# Patient Record
Sex: FEMALE | Race: WHITE | NOT HISPANIC OR LATINO | URBAN - METROPOLITAN AREA
[De-identification: names, ages, dates, MRNs, and addresses within clinical notes are randomized per-mention and may not be internally consistent; named-entity substitution may affect disease eponyms.]

---

## 2025-02-21 PROBLEM — E53.8 COBALAMIN DEFICIENCY: Status: ACTIVE | Noted: 2024-01-09

## 2025-02-21 PROBLEM — E04.1 THYROID NODULE GREATER THAN OR EQUAL TO 1.5 CM IN DIAMETER INCIDENTALLY NOTED ON IMAGING STUDY: Status: ACTIVE | Noted: 2024-01-09

## 2025-02-21 PROBLEM — R53.83 FATIGUE: Status: ACTIVE | Noted: 2024-01-09

## 2025-02-21 PROBLEM — E87.5 HYPERKALEMIA: Status: ACTIVE | Noted: 2023-08-18

## 2025-02-21 PROBLEM — M25.559 ARTHRALGIA OF HIP: Status: ACTIVE | Noted: 2024-01-09

## 2025-02-21 PROBLEM — N13.30 HYDRONEPHROSIS: Status: ACTIVE | Noted: 2024-01-09

## 2025-02-21 PROBLEM — M85.80 OSTEOPENIA: Status: ACTIVE | Noted: 2024-01-09

## 2025-02-21 PROBLEM — G47.62 SLEEP RELATED LEG CRAMPS: Status: ACTIVE | Noted: 2025-02-21

## 2025-02-21 PROBLEM — R01.1 SYSTOLIC MURMUR: Status: ACTIVE | Noted: 2024-01-09

## 2025-02-21 PROBLEM — E78.00 PURE HYPERCHOLESTEROLEMIA: Status: ACTIVE | Noted: 2025-02-21

## 2025-02-21 PROBLEM — R25.2 CRAMPS OF LOWER EXTREMITY: Status: ACTIVE | Noted: 2024-01-09

## 2025-02-21 PROBLEM — D64.9 ANEMIA: Status: ACTIVE | Noted: 2024-01-09

## 2025-02-21 PROBLEM — N39.41 URGE INCONTINENCE OF URINE: Status: ACTIVE | Noted: 2025-02-21

## 2025-02-21 PROBLEM — C49.21: Status: ACTIVE | Noted: 2020-07-02

## 2025-02-21 PROBLEM — K57.30 DIVERTICULA OF INTESTINE: Status: ACTIVE | Noted: 2020-03-26

## 2025-02-21 PROBLEM — K90.9 INTESTINAL MALABSORPTION: Status: ACTIVE | Noted: 2024-01-09

## 2025-02-21 PROBLEM — I10 HYPERTENSION: Status: ACTIVE | Noted: 2024-01-09

## 2025-02-21 PROBLEM — M19.90 OSTEOARTHRITIS: Status: ACTIVE | Noted: 2020-05-29

## 2025-02-21 RX ORDER — CELECOXIB 100 MG/1
100 CAPSULE ORAL 2 TIMES DAILY
COMMUNITY
Start: 2020-07-29 | End: 2025-03-06 | Stop reason: ALTCHOICE

## 2025-02-21 RX ORDER — FLAXSEED OIL 1000 MG
1000 CAPSULE ORAL DAILY
COMMUNITY
Start: 2020-07-29

## 2025-02-21 RX ORDER — METOPROLOL TARTRATE 25 MG/1
12.5 TABLET, FILM COATED ORAL EVERY OTHER DAY
COMMUNITY
Start: 2023-11-29

## 2025-02-21 RX ORDER — GABAPENTIN 100 MG/1
100 CAPSULE ORAL DAILY
COMMUNITY
Start: 2023-11-20

## 2025-02-21 RX ORDER — ASPIRIN 325 MG
50000 TABLET, DELAYED RELEASE (ENTERIC COATED) ORAL WEEKLY
COMMUNITY
Start: 2020-07-29

## 2025-02-21 RX ORDER — SIMVASTATIN 10 MG/1
10 TABLET, FILM COATED ORAL DAILY
COMMUNITY
Start: 2020-07-29

## 2025-02-21 RX ORDER — DEXTROMETHORPHAN HYDROBROMIDE, GUAIFENESIN 5; 100 MG/5ML; MG/5ML
LIQUID ORAL
COMMUNITY
Start: 2020-07-29

## 2025-02-21 RX ORDER — MIRABEGRON 25 MG/1
50 TABLET, FILM COATED, EXTENDED RELEASE ORAL DAILY
COMMUNITY
Start: 2023-12-13

## 2025-02-21 RX ORDER — SULFASALAZINE 500 MG/1
500 TABLET ORAL 4 TIMES DAILY
COMMUNITY

## 2025-02-21 RX ORDER — METHOTREXATE 2.5 MG/1
15 TABLET ORAL WEEKLY
COMMUNITY
Start: 2023-12-07

## 2025-02-21 RX ORDER — FOLIC ACID 1 MG/1
1 TABLET ORAL 3 TIMES DAILY
COMMUNITY

## 2025-02-21 RX ORDER — OMEPRAZOLE 40 MG/1
40 CAPSULE, DELAYED RELEASE ORAL
COMMUNITY
Start: 2020-07-29

## 2025-02-21 RX ORDER — HYDROXYCHLOROQUINE SULFATE 300 MG/1
300 TABLET ORAL DAILY
COMMUNITY

## 2025-02-21 SDOH — HEALTH STABILITY: PHYSICAL HEALTH: ON AVERAGE, HOW MANY DAYS PER WEEK DO YOU ENGAGE IN MODERATE TO STRENUOUS EXERCISE (LIKE A BRISK WALK)?: 3 DAYS

## 2025-02-21 NOTE — PROGRESS NOTES
GENERAL SURGERY/TRAUMA  HISTORY AND PHYSICAL/CONSULT    Date: 3/6/2025 Time: 3:08 PM    Name: Beatriz Raymond  MRN: [id number]    This is a 79 y.o. female presenting today as a new patient consult. Referred to me by Dr. Levon Palmer from [facility] for management of large size hiatal hernia and refractory GERD.     EGD 2/20/2025:            PAST MEDICAL HISTORY:  Patient Active Problem List   Diagnosis    Anemia    Arthralgia of hip    Atrophic vaginitis    Cobalamin deficiency    Constipation    Cramps of lower extremity    Sleep related leg cramps    Diverticula of intestine    Fatigue    Gastroesophageal reflux disease without esophagitis    Hydronephrosis    Hyperkalemia    Hypertension    Osteoarthritis    Intestinal malabsorption    Urge incontinence of urine    Osteopenia    Pure hypercholesterolemia    Soft tissue sarcoma of thigh, right (Multi)    Systolic murmur    Thyroid nodule greater than or equal to 1.5 cm in diameter incidentally noted on imaging study      Past Medical History:   Diagnosis Date    Chronic back pain     Colon polyp 2020    GERD (gastroesophageal reflux disease)     HLD (hyperlipidemia)     PONV (postoperative nausea and vomiting) 1975    RA (rheumatoid arthritis)     Sarcoidosis     Sarcoma of right thigh (Multi)     Thyroid nodule 2014        PAST SURGICAL HISTORY:  Past Surgical History:   Procedure Laterality Date    CHOLECYSTECTOMY  6/8/20    ESOPHAGOGASTRODUODENOSCOPY  4/8/16    HYSTERECTOMY  7/6/10       FAMILY HISTORY:  Family History   Problem Relation Name Age of Onset    Arthritis Mother Ce Kuhn     Hypertension Mother Ce Kuhn     Miscarriages / Stillbirths Mother Ce Kuhn     Cancer Father Danilo Connellyarcadio     Hearing loss Father Danilo Montenegrodave     Hyperlipidemia Father Danilo Hattie     Hearing loss Brother Poncho Montenegrodave     Breast cancer Paternal Grandmother Renetta montenegrodave     Colon cancer Neg Hx      Celiac disease Neg Hx      Colon polyps  Neg Hx      Irritable bowel syndrome Neg Hx      Liver disease Neg Hx      Blood clot Neg Hx          SOCIAL HISTORY:  Social History     Tobacco Use    Smoking status: Never    Smokeless tobacco: Never   Vaping Use    Vaping status: Never Used   Substance Use Topics    Alcohol use: Never    Drug use: Never       MEDICATIONS:  Prior to Admission Medications:    Current Outpatient Medications:     acetaminophen (Tylenol 8 HOUR) 650 mg ER tablet, , Disp: , Rfl:     calcium carbonate/vitamin D3 (CALCIUM 600 + D,3, ORAL), Take 600 mg by mouth once daily., Disp: , Rfl:     cholecalciferol (Vitamin D-3) 50,000 unit capsule, Take 1 capsule (50,000 Units) by mouth 1 (one) time per week., Disp: , Rfl:     estrogens, conjugated, (Premarin) vaginal cream, Insert 0.5 g into the vagina., Disp: , Rfl:     famotidine (Pepcid) 40 mg tablet, 90 tablets (3,600 mg)., Disp: , Rfl:     flaxseed oiL 1,000 mg capsule, Take 1 capsule (1,000 mg) by mouth once daily., Disp: , Rfl:     folic acid (Folvite) 1 mg tablet, Take 1 tablet (1 mg) by mouth 3 times a day., Disp: , Rfl:     gabapentin (Neurontin) 100 mg capsule, Take 1 capsule (100 mg) by mouth once daily., Disp: , Rfl:     hydroxychloroquine (Sovuna) 300 mg tablet tablet, Take 1 tablet (300 mg) by mouth once daily., Disp: , Rfl:     losartan (Cozaar) 25 mg tablet, 0, Disp: , Rfl:     methotrexate (Trexall) 2.5 mg tablet, Take 6 tablets (15 mg total) by mouth 1 (one) time per week., Disp: , Rfl:     metoprolol tartrate (Lopressor) 25 mg tablet, Take 0.5 tablets (12.5 mg) by mouth every other day., Disp: , Rfl:     mirabegron (Myrbetriq) 25 mg tablet extended release 24 hr 24 hr tablet, Take 2 tablets (50 mg) by mouth once daily., Disp: , Rfl:     omeprazole (PriLOSEC) 40 mg DR capsule, Take 1 capsule (40 mg) by mouth once daily in the morning. Take before meals., Disp: , Rfl:     simvastatin (Zocor) 10 mg tablet, Take 1 tablet (10 mg) by mouth once daily., Disp: , Rfl:      "sulfaSALAzine (Azulfidine) 500 mg tablet, Take 1 tablet (500 mg) by mouth 4 times a day., Disp: , Rfl:     amLODIPine (Norvasc) 5 mg tablet, 0, Disp: , Rfl:     ALLERGIES:  Allergies   Allergen Reactions    Codeine Nausea/vomiting, Nausea And Vomiting and Nausea Only     VOMITING    Other reaction(s): Contraindication-Medical Surgical   VOMITING    Codeine Sulfate Nausea/vomiting    Corn Containing Products Diarrhea     SYRUP    Iodine Rash       REVIEW OF SYSTEMS:  GENERAL: Negative for malaise, significant weight loss and fever  NECK: Negative for lumps, goiter, pain and significant neck swelling  RESPIRATORY: Negative for cough, wheezing or shortness of breath.  CARDIOVASCULAR: Negative for chest pain, leg swelling or palpitations.  GI: Negative for abdominal discomfort, blood in stools or black stools or change in bowel habits  : No history of dysuria, frequency or incontinence  MUSCULOSKELETAL: Negative for joint pain or swelling, back pain or muscle pain.  SKIN: Negative for lesions, rash, and itching.  PSYCH: Negative for sleep disturbance, mood disorder and recent psychosocial stressors.  ENDOCRINE: Negative for cold or heat intolerance, polyuria, polydipsia and goiter.    PHYSICAL EXAM:  Blood pressure 157/74, pulse 72, height 1.638 m (5' 4.5\"), weight 74.5 kg (164 lb 4.8 oz), SpO2 97%.  General appearance: NAD  Skin: warm, no erythema or rashes  Lungs: clear   Heart: regular rhythm   Abdomen: soft ND NT   Extremities: Normal exam of the extremities. No swelling or pain.    IMPRESSION:  79 year old female presenting today as a new patient consult. Referred to me by Dr. Levon Palmer from [facility] for management of large size hiatal hernia and refractory GERD.     PLAN:    Hiatal hernia repair with fundoplication discussed, all questions answered.  Patient and spouse voiced understanding and want to proceed with surgery.  Perioperative care discussed.    The risks of the procedure " including bleeding, visceral injury, perforation, pulmonary complications like atelectasis, pleural effusion etc, diarrhea or constipation, dysphagia, recurrent hiatal hernia, wrap related complications etc. infection, injury to neighboring organ, prolonged hospitalization, need for further procedure and death have been explained to the patient and Beatriz Raymond has expressed understanding and acceptance of them.     Will need cardiology clearance prior to surgery.    Alexander Barfield MD  Bariatric and Minimally Invasive General Surgery

## 2025-03-06 ENCOUNTER — OFFICE VISIT (OUTPATIENT)
Dept: SURGERY | Facility: CLINIC | Age: 80
End: 2025-03-06
Payer: MEDICARE

## 2025-03-06 ENCOUNTER — NUTRITION (OUTPATIENT)
Dept: SURGERY | Facility: CLINIC | Age: 80
End: 2025-03-06
Payer: MEDICARE

## 2025-03-06 VITALS
HEIGHT: 65 IN | DIASTOLIC BLOOD PRESSURE: 74 MMHG | BODY MASS INDEX: 27.38 KG/M2 | WEIGHT: 164.3 LBS | HEART RATE: 72 BPM | OXYGEN SATURATION: 97 % | SYSTOLIC BLOOD PRESSURE: 157 MMHG

## 2025-03-06 DIAGNOSIS — Z98.84 BARIATRIC SURGERY STATUS: ICD-10-CM

## 2025-03-06 DIAGNOSIS — K31.7 GASTRIC POLYPS: ICD-10-CM

## 2025-03-06 DIAGNOSIS — Z01.818 PREOPERATIVE CLEARANCE: ICD-10-CM

## 2025-03-06 DIAGNOSIS — R11.10 REGURGITATION OF FOOD: ICD-10-CM

## 2025-03-06 DIAGNOSIS — K21.9 GASTROESOPHAGEAL REFLUX DISEASE WITHOUT ESOPHAGITIS: ICD-10-CM

## 2025-03-06 DIAGNOSIS — K44.9 HIATAL HERNIA: Primary | ICD-10-CM

## 2025-03-06 PROCEDURE — 99205 OFFICE O/P NEW HI 60 MIN: CPT | Performed by: SURGERY

## 2025-03-06 PROCEDURE — 1159F MED LIST DOCD IN RCRD: CPT | Performed by: SURGERY

## 2025-03-06 PROCEDURE — 3078F DIAST BP <80 MM HG: CPT | Performed by: SURGERY

## 2025-03-06 PROCEDURE — 99215 OFFICE O/P EST HI 40 MIN: CPT | Performed by: SURGERY

## 2025-03-06 PROCEDURE — 1036F TOBACCO NON-USER: CPT | Performed by: SURGERY

## 2025-03-06 PROCEDURE — 3077F SYST BP >= 140 MM HG: CPT | Performed by: SURGERY

## 2025-03-06 RX ORDER — AMLODIPINE BESYLATE 5 MG/1
TABLET ORAL
COMMUNITY

## 2025-03-06 RX ORDER — LOSARTAN POTASSIUM 25 MG/1
TABLET ORAL
COMMUNITY
Start: 2023-11-29

## 2025-03-06 RX ORDER — FAMOTIDINE 40 MG/1
90 TABLET, FILM COATED ORAL
COMMUNITY
Start: 2024-06-28

## 2025-03-06 ASSESSMENT — PATIENT HEALTH QUESTIONNAIRE - PHQ9
SUM OF ALL RESPONSES TO PHQ9 QUESTIONS 1 AND 2: 0
1. LITTLE INTEREST OR PLEASURE IN DOING THINGS: NOT AT ALL
2. FEELING DOWN, DEPRESSED OR HOPELESS: NOT AT ALL

## 2025-03-07 NOTE — PROGRESS NOTES
Reviewed postop diet progression for hiatal hernia repair. Patient's spouse attended appointment, both asked appropriate questions. Provided with Hiatial Hernia Repair nutrition guidelines and RD contact information. Advised to contact RD with any questions.

## 2025-04-21 ENCOUNTER — OFFICE VISIT (OUTPATIENT)
Dept: CARDIOLOGY | Facility: CLINIC | Age: 80
End: 2025-04-21
Payer: MEDICARE

## 2025-04-21 ENCOUNTER — TELEPHONE (OUTPATIENT)
Dept: CARDIOLOGY | Facility: CLINIC | Age: 80
End: 2025-04-21

## 2025-04-21 VITALS
TEMPERATURE: 97.8 F | HEIGHT: 65 IN | WEIGHT: 157 LBS | BODY MASS INDEX: 26.16 KG/M2 | DIASTOLIC BLOOD PRESSURE: 70 MMHG | SYSTOLIC BLOOD PRESSURE: 125 MMHG | HEART RATE: 65 BPM

## 2025-04-21 DIAGNOSIS — I10 PRIMARY HYPERTENSION: ICD-10-CM

## 2025-04-21 DIAGNOSIS — E78.2 MIXED HYPERLIPIDEMIA: ICD-10-CM

## 2025-04-21 DIAGNOSIS — R01.1 MURMUR, HEART: ICD-10-CM

## 2025-04-21 DIAGNOSIS — Z01.818 PRE-OPERATIVE CLEARANCE: Primary | ICD-10-CM

## 2025-04-21 DIAGNOSIS — R94.31 ABNORMAL EKG: ICD-10-CM

## 2025-04-21 PROCEDURE — 99214 OFFICE O/P EST MOD 30 MIN: CPT | Performed by: NURSE PRACTITIONER

## 2025-04-21 PROCEDURE — 1036F TOBACCO NON-USER: CPT | Performed by: NURSE PRACTITIONER

## 2025-04-21 PROCEDURE — 99204 OFFICE O/P NEW MOD 45 MIN: CPT | Performed by: NURSE PRACTITIONER

## 2025-04-21 PROCEDURE — 3078F DIAST BP <80 MM HG: CPT | Performed by: NURSE PRACTITIONER

## 2025-04-21 PROCEDURE — 93005 ELECTROCARDIOGRAM TRACING: CPT | Performed by: NURSE PRACTITIONER

## 2025-04-21 PROCEDURE — 1159F MED LIST DOCD IN RCRD: CPT | Performed by: NURSE PRACTITIONER

## 2025-04-21 PROCEDURE — 1160F RVW MEDS BY RX/DR IN RCRD: CPT | Performed by: NURSE PRACTITIONER

## 2025-04-21 PROCEDURE — 3074F SYST BP LT 130 MM HG: CPT | Performed by: NURSE PRACTITIONER

## 2025-04-21 RX ORDER — LEFLUNOMIDE 20 MG/1
TABLET ORAL
COMMUNITY
Start: 2025-04-03

## 2025-04-21 NOTE — PROGRESS NOTES
"Chief Complaint:   Pre-operative clearance      History Of Present Illness:    Beatriz Raymond \"Slava" is a 79 y.o. female here for preoperative clearance for hernia surgery.     Had knee laparoscopic surgery under total sedation, without incidence, approximately 3 weeks ago.    Denies chest pain, SOB,     Prior to knee surgery when she would walk for exercise heart rate would go up quickly. Smart watch would read 120-140bpm. Did note she would become more SOB than baseline.     Underwent pre-operative stress test in 2020:  1. Left ventricle: Systolic function is normal by the biplane method of   disks. The estimated ejection fraction is 67%.   2. Pericardium, extracardiac: There is a moderate-sized left pleural   effusion noted.   3. Stress ECG conclusions: The stress ECG is normal.   4. Normal study after pharmacologic stress.    Hx of Sarcoidosis of lungs     Family Hx:  No family hx      Past Medical History:  She has a past medical history of Chronic back pain, Colon polyp (2020), GERD (gastroesophageal reflux disease), HLD (hyperlipidemia), PONV (postoperative nausea and vomiting) (1975), RA (rheumatoid arthritis), Sarcoidosis, Sarcoma of right thigh (Multi), and Thyroid nodule (2014).    Past Surgical History:  She has a past surgical history that includes Cholecystectomy (6/8/20); Hysterectomy (7/6/10); and Esophagogastroduodenoscopy (4/8/16).      Social History:  She reports that she has never smoked. She has never used smokeless tobacco. She reports that she does not drink alcohol and does not use drugs.    Family History:  Family History[1]    Allergies:  Codeine, Codeine sulfate, Corn containing products, and Iodine    Review of Systems  All pertinent systems have been reviewed and are negative except for what is stated in the history of present illness.    All other systems have been reviewed and are negative and noncontributory to this patient's current ailments.     Visit Vitals  /70 (BP Location: " "Right arm)   Pulse 65   Temp 36.6 °C (97.8 °F)   Ht 1.638 m (5' 4.5\")   Wt 71.2 kg (157 lb)   BMI 26.53 kg/m²   OB Status Postmenopausal   Smoking Status Never   BSA 1.8 m²     Objective   Vitals reviewed.   Constitutional:       Appearance: Healthy appearance. Not in distress.   Neck:      Vascular: No JVR. JVD normal.   Pulmonary:      Effort: Pulmonary effort is normal.      Breath sounds: Normal breath sounds. No wheezing. No rhonchi. No rales.   Chest:      Chest wall: Not tender to palpatation.   Cardiovascular:      PMI at left midclavicular line. Normal rate. Regular rhythm. Normal S1. Normal S2.       Murmurs: There is a grade 1/6 systolic murmur.      No gallop.  No click. No rub.   Edema:     Peripheral edema absent.   Abdominal:      General: Bowel sounds are normal.      Palpations: Abdomen is soft.      Tenderness: There is no abdominal tenderness.   Musculoskeletal: Normal range of motion.         General: No tenderness. Skin:     General: Skin is warm and dry.   Neurological:      General: No focal deficit present.      Mental Status: Alert and oriented to person, place and time.   Psychiatric:         Attention and Perception: Attention normal.         Mood and Affect: Mood normal.       Assessment/Plan   Diagnoses and all orders for this visit:  Pre-operative clearance  - EKG SB at 57bpm with inferior TWI. I cannot compare to prior EKGs (only see interpretation and not actual EKG tracing)  - The patient has no prior history of coronary artery disease, myocardial infarction, PCI (stent), CABG, heart failure, high-grade arrhythmias, valvular heart disease, pulmonary hypertension, peripheral arterial disease, or cerebrovascular disease.  The patient has no personal or family history of anesthetic complications during prior general anesthesia.  The patient has no history of DVT or PE.  Based upon the patient's present history, while ambulation is limited due to joint pain, their functional capacity is " greater than 4 METS.  Patients’ ability to expend >=4 METs can be assessed by estimates from activities of daily living; activities that expend >=4 METS include the ability to climb up a flight of stairs, walk up a hill, walk at ground level at 4 miles per hour, or perform heavy work around the house.  The patient denies chest pain or dyspnea on exertion.  The patient has no history of obstructive sleep apnea, alcohol abuse use, or tobacco use.  Pre-op laboratory studies were unremarkable.  - Due to murmur and abnormal EKG we will check TTE. Last TTE 5 years ago  Primary hypertension  - well controlled  Mixed hyperlipidemia  - simvastatin  Murmur, heart  - Transthoracic echo (TTE) complete; Future  Abnormal EKG  - Transthoracic echo (TTE) complete; Future    Outpatient Medications:  Current Outpatient Medications   Medication Instructions    acetaminophen (Tylenol 8 HOUR) 650 mg ER tablet     amLODIPine (Norvasc) 5 mg tablet 0    calcium carbonate/vitamin D3 (CALCIUM 600 + D,3, ORAL) 600 mg, Daily    cholecalciferol (VITAMIN D-3) 50,000 Units, Weekly    estrogens (conjugated) (PREMARIN) 0.5 g    famotidine (Pepcid) 40 mg tablet 90 tablets    flaxseed oiL 1,000 mg, Daily    hydroxychloroquine (SOVUNA) 300 mg, Daily    leflunomide (Arava) 20 mg tablet     losartan (Cozaar) 25 mg tablet 0    metoprolol tartrate (LOPRESSOR) 12.5 mg, Every other day    mirabegron (MYRBETRIQ) 50 mg, Daily    omeprazole (PRILOSEC) 40 mg, Daily before breakfast    simvastatin (ZOCOR) 10 mg, Daily    sulfaSALAzine (AZULFIDINE) 500 mg, 4 times daily     Exclusive of any other services or procedures performed, I, Gricelda CARMICHAEL, spent 30 minutes in duration for this visit today.  This time consisted of chart review, obtaining history, and/or performing the exam as documented above, as well as, documenting the clinical information for the encounter in the electronic record, discussing treatment options, plans, and/or goals with patient,  family, and/or caregiver, refilling medications, updating the electronic record, ordering medicines, lab work, imaging, referrals, and/or procedures as documented above and communicating with other [facility] professionals. I have discussed the results of laboratory, radiology, and cardiology studies with the patient and their family/caregiver.        I reviewed surgery's notes, labs, stress test and TTE in 2020. Reviewed EKG interpretation         [1]   Family History  Problem Relation Name Age of Onset    Arthritis Mother Ce Hattie     Hypertension Mother Ce Hattie     Miscarriages / Stillbirths Mother Ce Hattie     Cancer Father Danilo Her     Hearing loss Father Danilo Her     Hyperlipidemia Father Danilo Her     Hearing loss Brother Poncho Her     Breast cancer Paternal Grandmother Renetta her     Colon cancer Neg Hx      Celiac disease Neg Hx      Colon polyps Neg Hx      Irritable bowel syndrome Neg Hx      Liver disease Neg Hx      Blood clot Neg Hx

## 2025-04-22 LAB
ATRIAL RATE: 57 BPM
P AXIS: 26 DEGREES
P OFFSET: 188 MS
P ONSET: 134 MS
PR INTERVAL: 148 MS
Q ONSET: 208 MS
QRS COUNT: 10 BEATS
QRS DURATION: 98 MS
QT INTERVAL: 420 MS
QTC CALCULATION(BAZETT): 408 MS
QTC FREDERICIA: 413 MS
R AXIS: -23 DEGREES
T AXIS: 8 DEGREES
T OFFSET: 418 MS
VENTRICULAR RATE: 57 BPM

## 2025-04-25 ENCOUNTER — HOSPITAL ENCOUNTER (OUTPATIENT)
Dept: RADIOLOGY | Facility: CLINIC | Age: 80
Discharge: HOME | End: 2025-04-25
Payer: MEDICARE

## 2025-04-25 ENCOUNTER — HOSPITAL ENCOUNTER (OUTPATIENT)
Dept: CARDIOLOGY | Facility: CLINIC | Age: 80
Discharge: HOME | End: 2025-04-25
Payer: MEDICARE

## 2025-04-25 DIAGNOSIS — Z01.818 PRE-OPERATIVE CLEARANCE: ICD-10-CM

## 2025-04-25 DIAGNOSIS — R01.1 MURMUR, HEART: ICD-10-CM

## 2025-04-25 DIAGNOSIS — R94.31 ABNORMAL EKG: ICD-10-CM

## 2025-04-25 DIAGNOSIS — Z01.818 PREOPERATIVE CLEARANCE: ICD-10-CM

## 2025-04-25 DIAGNOSIS — K44.9 HIATAL HERNIA: ICD-10-CM

## 2025-04-25 PROCEDURE — 93306 TTE W/DOPPLER COMPLETE: CPT

## 2025-04-25 PROCEDURE — 71045 X-RAY EXAM CHEST 1 VIEW: CPT

## 2025-04-25 PROCEDURE — 93306 TTE W/DOPPLER COMPLETE: CPT | Performed by: INTERNAL MEDICINE

## 2025-04-26 LAB
ALBUMIN SERPL-MCNC: 4.3 G/DL (ref 3.6–5.1)
ALP SERPL-CCNC: 64 U/L (ref 37–153)
ALT SERPL-CCNC: 14 U/L (ref 6–29)
ANION GAP SERPL CALCULATED.4IONS-SCNC: 10 MMOL/L (CALC) (ref 7–17)
AST SERPL-CCNC: 9 U/L (ref 10–35)
BASOPHILS # BLD AUTO: 51 CELLS/UL (ref 0–200)
BASOPHILS NFR BLD AUTO: 0.8 %
BILIRUB SERPL-MCNC: 0.4 MG/DL (ref 0.2–1.2)
BUN SERPL-MCNC: 30 MG/DL (ref 7–25)
CALCIUM SERPL-MCNC: 9.6 MG/DL (ref 8.6–10.4)
CHLORIDE SERPL-SCNC: 107 MMOL/L (ref 98–110)
CO2 SERPL-SCNC: 24 MMOL/L (ref 20–32)
CREAT SERPL-MCNC: 1.22 MG/DL (ref 0.6–1)
EGFRCR SERPLBLD CKD-EPI 2021: 45 ML/MIN/1.73M2
EOSINOPHIL # BLD AUTO: 160 CELLS/UL (ref 15–500)
EOSINOPHIL NFR BLD AUTO: 2.5 %
ERYTHROCYTE [DISTWIDTH] IN BLOOD BY AUTOMATED COUNT: 12.8 % (ref 11–15)
GLUCOSE SERPL-MCNC: 100 MG/DL (ref 65–99)
HCT VFR BLD AUTO: 27.9 % (ref 35–45)
HGB BLD-MCNC: 8.9 G/DL (ref 11.7–15.5)
LYMPHOCYTES # BLD AUTO: 691 CELLS/UL (ref 850–3900)
LYMPHOCYTES NFR BLD AUTO: 10.8 %
MCH RBC QN AUTO: 30.3 PG (ref 27–33)
MCHC RBC AUTO-ENTMCNC: 31.9 G/DL (ref 32–36)
MCV RBC AUTO: 94.9 FL (ref 80–100)
MONOCYTES # BLD AUTO: 550 CELLS/UL (ref 200–950)
MONOCYTES NFR BLD AUTO: 8.6 %
NEUTROPHILS # BLD AUTO: 4947 CELLS/UL (ref 1500–7800)
NEUTROPHILS NFR BLD AUTO: 77.3 %
PLATELET # BLD AUTO: 281 THOUSAND/UL (ref 140–400)
PMV BLD REES-ECKER: 9.8 FL (ref 7.5–12.5)
POTASSIUM SERPL-SCNC: 4.3 MMOL/L (ref 3.5–5.3)
PROT SERPL-MCNC: 6.4 G/DL (ref 6.1–8.1)
RBC # BLD AUTO: 2.94 MILLION/UL (ref 3.8–5.1)
SODIUM SERPL-SCNC: 141 MMOL/L (ref 135–146)
WBC # BLD AUTO: 6.4 THOUSAND/UL (ref 3.8–10.8)

## 2025-04-27 LAB
AORTIC VALVE PEAK VELOCITY: 1.29 M/S
AV PEAK GRADIENT: 7 MMHG
AVA (PEAK VEL): 1.81 CM2
EJECTION FRACTION APICAL 4 CHAMBER: 65.5
EJECTION FRACTION: 65 %
LEFT ATRIUM VOLUME AREA LENGTH INDEX BSA: 33.8 ML/M2
LEFT VENTRICULAR OUTFLOW TRACT DIAMETER: 1.7 CM
MITRAL VALVE E/A RATIO: 0.87
RIGHT VENTRICLE FREE WALL PEAK S': 11.89 CM/S
TRICUSPID ANNULAR PLANE SYSTOLIC EXCURSION: 1.7 CM

## 2025-04-30 PROBLEM — K44.9 HIATAL HERNIA: Status: ACTIVE | Noted: 2025-03-06

## 2025-05-05 NOTE — H&P (VIEW-ONLY)
GENERAL SURGERY CLINIC  FOLLOW UP NOTE      Name: Beatriz Raymond  MRN: [id number]      Index Surgery  Date of Surgery: 5/21/25   Surgeon: Dr. Alexander Barfield    Surgical Procedure: Hiatal hernia repair 74599    HPI: 79 year old female presenting for a final pre op visit in advance of a planned hiatal hernia repair on 5/21/25.     Cleared by JOSE Perry in Cardiology 4/21/25  Pre-operative clearance  - EKG SB at 57bpm with inferior TWI. I cannot compare to prior EKGs (only see interpretation and not actual EKG tracing)  - The patient has no prior history of coronary artery disease, myocardial infarction, PCI (stent), CABG, heart failure, high-grade arrhythmias, valvular heart disease, pulmonary hypertension, peripheral arterial disease, or cerebrovascular disease.  The patient has no personal or family history of anesthetic complications during prior general anesthesia.  The patient has no history of DVT or PE.  Based upon the patient's present history, while ambulation is limited due to joint pain, their functional capacity is greater than 4 METS.  Patients’ ability to expend >=4 METs can be assessed by estimates from activities of daily living; activities that expend >=4 METS include the ability to climb up a flight of stairs, walk up a hill, walk at ground level at 4 miles per hour, or perform heavy work around the house.  The patient denies chest pain or dyspnea on exertion.  The patient has no history of obstructive sleep apnea, alcohol abuse use, or tobacco use.  Pre-op laboratory studies were unremarkable.  - No CV barriers to non cardiac surgery     EGD 2/20/2025:                 REVIEW OF SYSTEMS:  CONSTITUTIONAL: Patient denies fevers, chills, sweats and weight changes.  CARDIOVASCULAR: Patient denies chest pains, palpitations, orthopnea and paroxysmal nocturnal dyspnea.  RESPIRATORY: No dyspnea on exertion, no wheezing or cough.  GI: No nausea, vomiting, diarrhea, constipation, abdominal pain, hematochezia  "or melena.  MUSCULOSKELETAL: No myalgias or arthralgias.  NEUROLOGIC: No chronic headaches, no seizures. Patient denies numbness, tingling or weakness.  PSYCHIATRIC: Patient denies problems with mood disturbance. No problems with anxiety.  ENDOCRINE: No excessive urination or excessive thirst.  DERMATOLOGIC: Patient denies any rashes or skin changes.    PHYSICAL EXAM:  /74 (BP Location: Left arm, Patient Position: Sitting, BP Cuff Size: Adult)   Pulse 58   Ht 1.638 m (5' 4.5\")   Wt 69.9 kg (154 lb)   SpO2 100%   BMI 26.03 kg/m²   Alert, well appearing, no acute distress, nourished, hydrated.  Anicteric sclera, no ptosis  Facial symmetry  Neck supple  Unlabored respirations.  Easily conversant without increased respiratory effort  Oriented to person, place, time.  Judgement intact.  Appropriate mood, affect.   Abdomen soft, nondistended, nontender  No peripheral edema      ASSESSMENT & PLAN:  79 y.o. female presenting for a final pre op visit for a planned hiatal hernia repair on 5/21/25.     Patient is suffering from reflux, regurgitation  Extensive work-up has been done  Endoscopy is consistent with moderate to large hiatal hernia  Patient was seen previously in options were discussed with the patient, she was interested in proceeding with hiatal hernia repair with fundoplication.  Hiatal hernia repair with fundoplication discussed again today  Risk of bleeding, leak, perioperative cardiopulmonary complication, dysphagia, GERD, worsening symptoms, recurrent symptoms, recurrent hernia, wrap migration, need for endoscopic interventions, pleural effusion, need for aspiration, need for revisions in future, vagus nerve injury, diarrhea, bloating, inability to belch or vomit etc. were discussed all questions were answered.  Patient wants to proceed.  Complete versus partial fundoplication discussed and will decide intraoperatively.     Perioperative care was discussed with the patient.    Patient advised to " consume liquids for 1 week, puréed diet for 1 week, soft diet for 1 week and then advance to solids.    No driving for 2 weeks    Avoiding heavy materials or heavy activities for 6 weeks after the surgery to avoid hernia recurrence    Patient also educated on avoiding constipation.    Informed consent was obtained.    Will proceed with the surgery as planned.

## 2025-05-05 NOTE — PATIENT INSTRUCTIONS
You are scheduled for a Hiatal Hernia Repair with Dr. Barfield on 5/21/25.     YOU DO NOT NEED TO DO A BOWEL PREP.  Follow any dietary instructions given to you by preadmission testing for the day prior to surgery.  Please refer to your RD instructions booklet for your diet advancement after surgery.     You will receive a phone call the day prior to surgery with your OR arrival time.  Make a shopping list and  your supplements prior to surgery.     You will have a prescription for Omeprazole (antacid) and Oxycodone (pain) sent to the [facility] and delivered to your bedside during your stay unless you have opted to have us send them to your local pharmacy.     Be sure to take the omeprazole every day for at least a month starting when you get home from the hospital.     Call with any questions! [phone] for Rona.

## 2025-05-05 NOTE — PROGRESS NOTES
GENERAL SURGERY CLINIC  FOLLOW UP NOTE      Name: Beatriz Raymond  MRN: [id number]      Index Surgery  Date of Surgery: 5/21/25   Surgeon: Dr. Alexander Barfield    Surgical Procedure: Hiatal hernia repair 27611    HPI: 79 year old female presenting for a final pre op visit in advance of a planned hiatal hernia repair on 5/21/25.     Cleared by JOSE Perry in Cardiology 4/21/25  Pre-operative clearance  - EKG SB at 57bpm with inferior TWI. I cannot compare to prior EKGs (only see interpretation and not actual EKG tracing)  - The patient has no prior history of coronary artery disease, myocardial infarction, PCI (stent), CABG, heart failure, high-grade arrhythmias, valvular heart disease, pulmonary hypertension, peripheral arterial disease, or cerebrovascular disease.  The patient has no personal or family history of anesthetic complications during prior general anesthesia.  The patient has no history of DVT or PE.  Based upon the patient's present history, while ambulation is limited due to joint pain, their functional capacity is greater than 4 METS.  Patients’ ability to expend >=4 METs can be assessed by estimates from activities of daily living; activities that expend >=4 METS include the ability to climb up a flight of stairs, walk up a hill, walk at ground level at 4 miles per hour, or perform heavy work around the house.  The patient denies chest pain or dyspnea on exertion.  The patient has no history of obstructive sleep apnea, alcohol abuse use, or tobacco use.  Pre-op laboratory studies were unremarkable.  - No CV barriers to non cardiac surgery     EGD 2/20/2025:                 REVIEW OF SYSTEMS:  CONSTITUTIONAL: Patient denies fevers, chills, sweats and weight changes.  CARDIOVASCULAR: Patient denies chest pains, palpitations, orthopnea and paroxysmal nocturnal dyspnea.  RESPIRATORY: No dyspnea on exertion, no wheezing or cough.  GI: No nausea, vomiting, diarrhea, constipation, abdominal pain, hematochezia  "or melena.  MUSCULOSKELETAL: No myalgias or arthralgias.  NEUROLOGIC: No chronic headaches, no seizures. Patient denies numbness, tingling or weakness.  PSYCHIATRIC: Patient denies problems with mood disturbance. No problems with anxiety.  ENDOCRINE: No excessive urination or excessive thirst.  DERMATOLOGIC: Patient denies any rashes or skin changes.    PHYSICAL EXAM:  /74 (BP Location: Left arm, Patient Position: Sitting, BP Cuff Size: Adult)   Pulse 58   Ht 1.638 m (5' 4.5\")   Wt 69.9 kg (154 lb)   SpO2 100%   BMI 26.03 kg/m²   Alert, well appearing, no acute distress, nourished, hydrated.  Anicteric sclera, no ptosis  Facial symmetry  Neck supple  Unlabored respirations.  Easily conversant without increased respiratory effort  Oriented to person, place, time.  Judgement intact.  Appropriate mood, affect.   Abdomen soft, nondistended, nontender  No peripheral edema      ASSESSMENT & PLAN:  79 y.o. female presenting for a final pre op visit for a planned hiatal hernia repair on 5/21/25.     Patient is suffering from reflux, regurgitation  Extensive work-up has been done  Endoscopy is consistent with moderate to large hiatal hernia  Patient was seen previously in options were discussed with the patient, she was interested in proceeding with hiatal hernia repair with fundoplication.  Hiatal hernia repair with fundoplication discussed again today  Risk of bleeding, leak, perioperative cardiopulmonary complication, dysphagia, GERD, worsening symptoms, recurrent symptoms, recurrent hernia, wrap migration, need for endoscopic interventions, pleural effusion, need for aspiration, need for revisions in future, vagus nerve injury, diarrhea, bloating, inability to belch or vomit etc. were discussed all questions were answered.  Patient wants to proceed.  Complete versus partial fundoplication discussed and will decide intraoperatively.     Perioperative care was discussed with the patient.    Patient advised to " consume liquids for 1 week, puréed diet for 1 week, soft diet for 1 week and then advance to solids.    No driving for 2 weeks    Avoiding heavy materials or heavy activities for 6 weeks after the surgery to avoid hernia recurrence    Patient also educated on avoiding constipation.    Informed consent was obtained.    Will proceed with the surgery as planned.

## 2025-05-15 ENCOUNTER — OFFICE VISIT (OUTPATIENT)
Dept: SURGERY | Facility: CLINIC | Age: 80
End: 2025-05-15
Payer: MEDICARE

## 2025-05-15 ENCOUNTER — NUTRITION (OUTPATIENT)
Dept: SURGERY | Facility: CLINIC | Age: 80
End: 2025-05-15
Payer: MEDICARE

## 2025-05-15 ENCOUNTER — PRE-ADMISSION TESTING (OUTPATIENT)
Dept: PREADMISSION TESTING | Facility: HOSPITAL | Age: 80
End: 2025-05-15
Payer: MEDICARE

## 2025-05-15 VITALS
TEMPERATURE: 95.7 F | OXYGEN SATURATION: 94 % | RESPIRATION RATE: 16 BRPM | HEART RATE: 73 BPM | SYSTOLIC BLOOD PRESSURE: 160 MMHG | DIASTOLIC BLOOD PRESSURE: 74 MMHG | HEIGHT: 64 IN | WEIGHT: 152.12 LBS | BODY MASS INDEX: 25.97 KG/M2

## 2025-05-15 VITALS
SYSTOLIC BLOOD PRESSURE: 160 MMHG | WEIGHT: 154 LBS | HEIGHT: 65 IN | BODY MASS INDEX: 25.66 KG/M2 | OXYGEN SATURATION: 100 % | HEART RATE: 58 BPM | DIASTOLIC BLOOD PRESSURE: 74 MMHG

## 2025-05-15 DIAGNOSIS — Z01.818 PREOPERATIVE CLEARANCE: ICD-10-CM

## 2025-05-15 DIAGNOSIS — K21.9 GASTROESOPHAGEAL REFLUX DISEASE WITHOUT ESOPHAGITIS: ICD-10-CM

## 2025-05-15 DIAGNOSIS — G89.18 POST-OP PAIN: ICD-10-CM

## 2025-05-15 DIAGNOSIS — Z01.818 PRE-OP TESTING: Primary | ICD-10-CM

## 2025-05-15 DIAGNOSIS — R79.1 ABNORMAL COAGULATION PROFILE: ICD-10-CM

## 2025-05-15 DIAGNOSIS — K44.9 HIATAL HERNIA: Primary | ICD-10-CM

## 2025-05-15 DIAGNOSIS — K44.9 HIATAL HERNIA: ICD-10-CM

## 2025-05-15 LAB
ABO GROUP (TYPE) IN BLOOD: NORMAL
ABO GROUP (TYPE) IN BLOOD: NORMAL
ANTIBODY SCREEN: NORMAL
APTT PPP: 31 SECONDS (ref 26–36)
INR PPP: 1 (ref 0.9–1.1)
PROTHROMBIN TIME: 10.7 SECONDS (ref 9.8–12.4)
RH FACTOR (ANTIGEN D): NORMAL
RH FACTOR (ANTIGEN D): NORMAL

## 2025-05-15 PROCEDURE — 80323 ALKALOIDS NOS: CPT

## 2025-05-15 PROCEDURE — 99204 OFFICE O/P NEW MOD 45 MIN: CPT | Performed by: NURSE PRACTITIONER

## 2025-05-15 PROCEDURE — 36415 COLL VENOUS BLD VENIPUNCTURE: CPT

## 2025-05-15 PROCEDURE — 99214 OFFICE O/P EST MOD 30 MIN: CPT | Performed by: SURGERY

## 2025-05-15 PROCEDURE — 1159F MED LIST DOCD IN RCRD: CPT | Performed by: SURGERY

## 2025-05-15 PROCEDURE — 86901 BLOOD TYPING SEROLOGIC RH(D): CPT

## 2025-05-15 PROCEDURE — 1036F TOBACCO NON-USER: CPT | Performed by: SURGERY

## 2025-05-15 PROCEDURE — 85610 PROTHROMBIN TIME: CPT

## 2025-05-15 PROCEDURE — 1126F AMNT PAIN NOTED NONE PRSNT: CPT | Performed by: SURGERY

## 2025-05-15 PROCEDURE — 3078F DIAST BP <80 MM HG: CPT | Performed by: SURGERY

## 2025-05-15 PROCEDURE — 3077F SYST BP >= 140 MM HG: CPT | Performed by: SURGERY

## 2025-05-15 RX ORDER — CELECOXIB 100 MG/1
100 CAPSULE ORAL
COMMUNITY
Start: 2023-07-12 | End: 2025-05-15 | Stop reason: ALTCHOICE

## 2025-05-15 RX ORDER — SOLIFENACIN SUCCINATE 5 MG/1
5 TABLET, FILM COATED ORAL
COMMUNITY
Start: 2025-04-09

## 2025-05-15 RX ORDER — HYDROXYCHLOROQUINE SULFATE 200 MG/1
TABLET, FILM COATED ORAL
COMMUNITY
Start: 2025-04-03

## 2025-05-15 RX ORDER — OMEPRAZOLE 40 MG/1
40 CAPSULE, DELAYED RELEASE ORAL
Qty: 30 CAPSULE | Refills: 0 | Status: SHIPPED | OUTPATIENT
Start: 2025-05-15 | End: 2025-06-14

## 2025-05-15 RX ORDER — OXYCODONE HCL 5 MG/5 ML
5 SOLUTION, ORAL ORAL EVERY 6 HOURS PRN
Qty: 140 ML | Refills: 0 | Status: SHIPPED | OUTPATIENT
Start: 2025-05-15 | End: 2025-05-22

## 2025-05-15 ASSESSMENT — DUKE ACTIVITY SCORE INDEX (DASI)
CAN YOU DO MODERATE WORK AROUND THE HOUSE LIKE VACUUMING, SWEEPING FLOORS OR CARRYING GROCERIES: YES
CAN YOU DO LIGHT WORK AROUND THE HOUSE LIKE DUSTING OR WASHING DISHES: YES
CAN YOU RUN A SHORT DISTANCE: NO
CAN YOU DO YARD WORK LIKE RAKING LEAVES, WEEDING OR PUSHING A MOWER: YES
DASI METS SCORE: 7.3
TOTAL_SCORE: 36.7
CAN YOU HAVE SEXUAL RELATIONS: YES
CAN YOU TAKE CARE OF YOURSELF (EAT, DRESS, BATHE, OR USE TOILET): YES
CAN YOU WALK A BLOCK OR TWO ON LEVEL GROUND: YES
CAN YOU CLIMB A FLIGHT OF STAIRS OR WALK UP A HILL: YES
CAN YOU PARTICIPATE IN STRENOUS SPORTS LIKE SWIMMING, SINGLES TENNIS, FOOTBALL, BASKETBALL, OR SKIING: NO
CAN YOU DO HEAVY WORK AROUND THE HOUSE LIKE SCRUBBING FLOORS OR LIFTING AND MOVING HEAVY FURNITURE: YES
CAN YOU PARTICIPATE IN MODERATE RECREATIONAL ACTIVITIES LIKE GOLF, BOWLING, DANCING, DOUBLES TENNIS OR THROWING A BASEBALL OR FOOTBALL: NO
CAN YOU WALK INDOORS, SUCH AS AROUND YOUR HOUSE: YES

## 2025-05-15 ASSESSMENT — PATIENT HEALTH QUESTIONNAIRE - PHQ9
2. FEELING DOWN, DEPRESSED OR HOPELESS: NOT AT ALL
SUM OF ALL RESPONSES TO PHQ9 QUESTIONS 1 AND 2: 0
1. LITTLE INTEREST OR PLEASURE IN DOING THINGS: NOT AT ALL

## 2025-05-15 ASSESSMENT — PAIN SCALES - GENERAL: PAINLEVEL_OUTOF10: 0-NO PAIN

## 2025-05-15 NOTE — CPM/PAT H&P
"CPM/PAT Evaluation       Name: Beatriz Raymond (Beatriz Raymond \"Ginny\")  /Age: 1945/79 y.o.     In-Person       Chief Complaint:     79 yr old female presents to [facility] for pre-operative evaluation, with c/o hiatal hernia  ROBOTIC ASSISTED HIATAL HERNIA REPAIR WITH FUNDOPLICATION  is Scheduled on 2025 with Dr. 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 Gill- LOV 1 month ago  She is s/p right knee surgery done 3/21/2025      Denies fever, chills or nausea.   Denies any past issues with anesthesia.        Vitals:    05/15/25 1020   BP: 160/74   Pulse: 73   Resp: 16   Temp: 35.4 °C (95.7 °F)   SpO2: 94%          Medical History[1]    Surgical History[2]    Patient  reports being sexually active and has had partner(s) who are male. She reports using the following method of birth control/protection: None.    Family History[3]    Allergies[4]    Prior to Admission medications    Medication Sig Start Date End Date Taking? Authorizing Provider   acetaminophen (Tylenol 8 HOUR) 650 mg ER tablet  20   Historical Provider, MD   amLODIPine (Norvasc) 5 mg tablet 0    Historical Provider, MD   calcium carbonate/vitamin D3 (CALCIUM 600 + D,3, ORAL) Take 600 mg by mouth once daily.    Historical Provider, MD   cholecalciferol (Vitamin D-3) 50,000 unit capsule Take 1 capsule (50,000 Units) by mouth 1 " (one) time per week. 7/29/20   Historical Provider, MD   famotidine (Pepcid) 40 mg tablet 90 tablets (3,600 mg). 6/28/24   Historical Provider, MD   flaxseed oiL 1,000 mg capsule Take 1 capsule (1,000 mg) by mouth once daily. 7/29/20   Historical Provider, MD   hydroxychloroquine (Plaquenil) 200 mg tablet  4/3/25   Historical Provider, MD   hydroxychloroquine (Sovuna) 300 mg tablet tablet Take 1 tablet (300 mg) by mouth once daily.    Historical Provider, MD   leflunomide (Arava) 20 mg tablet  4/3/25   Historical Provider, MD   losartan (Cozaar) 25 mg tablet 0 11/29/23   Historical Provider, MD   metoprolol tartrate (Lopressor) 25 mg tablet Take 0.5 tablets (12.5 mg) by mouth every other day. 11/29/23   Historical Provider, MD   mirabegron (Myrbetriq) 25 mg tablet extended release 24 hr 24 hr tablet Take 2 tablets (50 mg) by mouth once daily. 12/13/23   Historical Provider, MD   omeprazole (PriLOSEC) 40 mg DR capsule Take 1 capsule (40 mg) by mouth once daily in the morning. Take before meals. 7/29/20   Historical Provider, MD   omeprazole (PriLOSEC) 40 mg DR capsule Take 1 capsule (40 mg) by mouth once daily in the morning. Take before meals. Do not crush or chew. Open capsule, sprinkle beads on SF jello, pudding or applesauce. 5/15/25 6/14/25  Eliud Hutton MD   oxyCODONE (Roxicodone) 5 mg/5 mL solution Take 5 mL (5 mg) by mouth every 6 hours if needed for severe pain (7 - 10) or moderate pain (4 - 6) for up to 7 days. 5/15/25 5/22/25  Eliud Hutton MD   simvastatin (Zocor) 10 mg tablet Take 1 tablet (10 mg) by mouth once daily. 7/29/20   Historical Provider, MD   solifenacin (VESIcare) 5 mg tablet 1 tablet (5 mg). 4/9/25   Historical Provider, MD   sulfaSALAzine (Azulfidine) 500 mg tablet Take 1 tablet (500 mg) by mouth 4 times a day.    Historical Provider, MD   celecoxib (CeleBREX) 100 mg capsule 1 capsule (100 mg).  Patient not taking: Reported on 5/15/2025 7/12/23 5/15/25  Historical Provider, MD    estrogens, conjugated, (Premarin) vaginal cream Insert 0.5 g into the vagina.  Patient not taking: Reported on 5/15/2025 7/29/20 5/15/25  Historical Provider, MD          Review of Systems  Constitutional: NO F, chills, or sweats  Eyes: glasses, no blurred vision or visual disturbance  ENT: chronic congestion with sore throat.lots of mucus in throat,voice changes,  difficulty hearing  Cardiovascular: no chest pain, no edema, no palps and no syncope.   Respiratory: FRASER, no cough,no s.o.b. and no wheezing  Gastrointestinal: see HPI   Genitourinary: MARTIN, nocturia, no dysuria, no urinary hesitancy    Musculoskeletal: no arthralgias, no back pain and no myalgias.   Integumentary: no new skin lesions and no rashes.   Neurological: no difficulty walking, no headache, no limb weakness, no numbness and no tingling.   Endocrine: no recent weight gain and no recent weight loss.   Hematologic/Lymphatic:  States is anemic- followed Heme in the past, now managed with Rheumatologist  Vit B injections- last on , due 2025; no tendency for easy bruising and no swollen glands.      Physical Exam  Constitutional:       Appearance: Normal appearance.   Cardiovascular:      Rate and Rhythm: Normal rate.      Heart sounds: Murmur heard.      Systolic murmur is present with a grade of 1/6.   Pulmonary:      Effort: Pulmonary effort is normal.      Breath sounds: Normal breath sounds.   Abdominal:      General: Bowel sounds are normal.      Palpations: Abdomen is soft.   Musculoskeletal:         General: Normal range of motion.      Cervical back: Normal range of motion.      Right lower le+ Edema present.      Left lower le+ Edema present.   Skin:     General: Skin is warm and dry.   Neurological:      Mental Status: She is alert and oriented to person, place, and time.          PAT AIRWAY:   Airway:     Mallampati::  II    TM distance::  <3 FB    Neck ROM::  Full      Visit Vitals  /74   Pulse 73   Temp 35.4 °C  "(95.7 °F)   Resp 16   Ht 1.626 m (5' 4\")   Wt 69 kg (152 lb 1.9 oz)   SpO2 94%   BMI 26.11 kg/m²   OB Status Postmenopausal   Smoking Status Never   BSA 1.77 m²       DASI Risk Score      Flowsheet Row Pre-Admission Testing [facility]15/[facility]25 in [facility]   Can you take care of yourself (eat, dress, bathe, or use toilet)?  2.75 filed at 05/15/2025 1014   Can you walk indoors, such as around your house? 1.75 filed at 05/15/2025 1014   Can you walk a block or two on level ground?  2.75 filed at 05/15/2025 1014   Can you climb a flight of stairs or walk up a hill? 5.5 filed at 05/15/2025 1014   Can you run a short distance? 0 filed at 05/15/2025 1014   Can you do light work around the house like dusting or washing dishes? 2.7 filed at 05/15/2025 1014   Can you do moderate work around the house like vacuuming, sweeping floors or carrying groceries? 3.5 filed at 05/15/2025 1014   Can you do heavy work around the house like scrubbing floors or lifting and moving heavy furniture?  8 filed at 05/15/2025 1014   Can you do yard work like raking leaves, weeding or pushing a mower? 4.5 filed at 05/15/2025 1014   Can you have sexual relations? 5.25 filed at 05/15/2025 1014   Can you participate in moderate recreational activities like golf, bowling, dancing, doubles tennis or throwing a baseball or football? 0 filed at 05/15/2025 1014   Can you participate in strenous sports like swimming, singles tennis, football, basketball, or skiing? 0 filed at 05/15/2025 1014   DASI SCORE 36.7 filed at 05/15/2025 1014   METS Score (Will be calculated only when all the questions are answered) 7.3 filed at 05/15/2025 1014          Caprini DVT Assessment    No data to display       Modified Frailty Index    No data to display       [id number] Stroke Risk Points  Current as of just now        N/A 0 to 9 Points:      Last Change: N/A          The [id number] risk score (Lip KEN, et al. 2009. © 2010 [employer] " Physicians) quantifies the risk of stroke for a patient with atrial fibrillation. For patients without atrial fibrillation or under the age of 18 this score appears as N/A. Higher score values generally indicate higher risk of stroke.        This score is not applicable to this patient. Components are not calculated.          Revised Cardiac Risk Index    No data to display       Apfel Simplified Score    No data to display       Risk Analysis Index Results This Encounter    No data found in the last 10 encounters.       Prodigy: High Risk  Total Score: 15              Prodigy Age Score      Prodigy Previous Opioid Use Score           ARISCAT Score for Postoperative Pulmonary Complications    No data to display       Shaffer Perioperative Risk for Myocardial Infarction or Cardiac Arrest (QUEENIE)    No data to display         ASSESSMENT    Anemia  H/o follow Hematologist  B12 injections monthly    GERD, IBS  Follows GI, Dr. Barfield  Plan for hiatal hernia repair as scheduled    HLD/HTN/murmur  Takes Amlodipine, Losartan, Metoprolol, simvastatin  ECG 4/21/2025- Sinus bradycardia, 57 bpm  Cardiac clearance obtained     OAB  Takes solifenacin    RA, sarcoidosis  Follows rheumatology  Tx with prednisone and methotrexate in the past  Takes Hydroxychloroquine, Leflunomide        ANESTHESIA FINDINGS:  Intubation History: No history of difficult intubation  Significant Anesthesia Considerations:      Airway History: No abnormal airway history  Predictors of Difficult Airway Management  ? STOP BANG -3      CONSULTS:    Cardiac clearance-->   Progress Notes by Gricelda Perry, LONNIE-CNP (04/21/2025 10:20)      The Following Tests/Procedures Have Been Initiated and Reviewed/ interpreted by me:   Coag screen, Type / screen  CBC, CMP reviewed from 4/25/2025  ECG reviewed from 4/21/2025      Planned Anesthetic: general     Instructions Given to Patient:  *Reviewed Medications to be taken in AM of surgery or held  Patient given verbal and  written preop instructions and voices comprehension and compliance.     No further testing required.      PLAN  This patient is optimally prepared for surgery.           [1]   Past Medical History:  Diagnosis Date    Anemia     Arthritis     Cataract     Chronic back pain     Colon polyp 2020    Fibroid     GERD (gastroesophageal reflux disease)     Hearing aid worn     Hiatal hernia     HL (hearing loss)     HLD (hyperlipidemia)     Hypertension     PONV (postoperative nausea and vomiting) 1975    RA (rheumatoid arthritis)     Sarcoidosis     Sarcoma (Multi)     right thigh    Sarcoma of right thigh (Multi)     Thyroid nodule 2014    Vision loss    [2]   Past Surgical History:  Procedure Laterality Date    CATARACT EXTRACTION      CHOLECYSTECTOMY  6/8/20    COLONOSCOPY      ESOPHAGOGASTRODUODENOSCOPY  4/8/16    HIP ARTHROPLASTY      HYSTERECTOMY  7/6/10    KNEE ARTHROSCOPY W/ MENISCAL REPAIR Left     LEG SURGERY Right     sarcoma 2020 no bone involvement    UPPER GASTROINTESTINAL ENDOSCOPY     [3]   Family History  Problem Relation Name Age of Onset    Arthritis Mother Ce Her     Hypertension Mother Ce Her     Miscarriages / Stillbirths Mother Ce Her     Cancer Father Danilo Her     Hearing loss Father Danilo Her     Hyperlipidemia Father Danilo Her     Hearing loss Brother Poncho Her     Breast cancer Paternal Grandmother Renetta her     Colon cancer Neg Hx      Celiac disease Neg Hx      Colon polyps Neg Hx      Irritable bowel syndrome Neg Hx      Liver disease Neg Hx      Blood clot Neg Hx     [4]   Allergies  Allergen Reactions    Codeine Nausea/vomiting, Nausea And Vomiting and Nausea Only     VOMITING    Other reaction(s): Contraindication-Medical Surgical   VOMITING    Codeine Sulfate Nausea/vomiting    Corn Containing Products Diarrhea     SYRUP    Iodinated Contrast Media Rash    Iodine Rash

## 2025-05-15 NOTE — PREPROCEDURE INSTRUCTIONS
One of our staff members will call you one business day before your surgery between 12-4pm to let you know the time to arrive at the hospital. If you have not received a phone call by 2pm call [phone].     When you arrive at the hospital, go to Registration on the ground floor.   You will need a responsible adult to drive you home.     Prior to surgery date:  One (1) week prior to surgery STOP:  -Stop aspirin products. Do not take NSAIDS/ Ibuprofen, Aleve, Motrin. Okay to take Tylenol or Acetaminophen. Do not take vitamins, supplements, herbals, diet pills.   -Stop these medications:   -No alcohol for 24 hours prior to surgery.  -No smoking 24 hours prior. No Marijuana, CBD oil, or Vaping 48 hours prior to surgery.  -Enjoy a light supper the evening before surgery.    Day of Surgery:  -Nothing to eat or drink after midnight. This includes food of any kind (including hard candy, cough drops, mints and gum, coffee).   -Have 13oz of Clear liquids 2hrs prior to Arrival time.   -No acrylic nails or nail polish on at least one fingernail; no polish on toes for foot surgery.   -No body piercing or jewelry.   -Shower as directed. No deodorant, lotion, power, oils, perfume, make-up.  -Mouthwash night before and morning of surgery.   -Wear loose comfortable clothing to accommodate your bandages.     -If  you have questions for PAT please call [phone].      Medication List            Accurate as of May 15, 2025 10:45 AM. Always use your most recent med list.                acetaminophen 650 mg ER tablet  Commonly known as: Tylenol 8 HOUR  Medication Adjustments for Surgery: Take/Use as prescribed     amLODIPine 5 mg tablet  Commonly known as: Norvasc  Medication Adjustments for Surgery: Take on the morning of surgery     CALCIUM 600 + D(3) ORAL  Additional Medication Adjustments for Surgery: Take last dose 7 days before surgery  Notes to patient: Stop now     cholecalciferol 1.25 mg (50,000 units) capsule  Commonly  known as: Vitamin D-3  Additional Medication Adjustments for Surgery: Take last dose 7 days before surgery  Notes to patient: Stop now     famotidine 40 mg tablet  Commonly known as: Pepcid  Medication Adjustments for Surgery: Take on the morning of surgery     flaxseed oiL 1,000 mg capsule  Additional Medication Adjustments for Surgery: Take last dose 7 days before surgery  Notes to patient: Stop now     * hydroxychloroquine 300 mg tablet tablet  Commonly known as: Sovuna  Medication Adjustments for Surgery: Take on the morning of surgery     * hydroxychloroquine 200 mg tablet  Commonly known as: Plaquenil  Medication Adjustments for Surgery: Take on the morning of surgery     leflunomide 20 mg tablet  Commonly known as: Arava  Medication Adjustments for Surgery: Take on the morning of surgery     losartan 25 mg tablet  Commonly known as: Cozaar  Medication Adjustments for Surgery: Take last dose 1 day (24 hours) before surgery     metoprolol tartrate 25 mg tablet  Commonly known as: Lopressor  Medication Adjustments for Surgery: Take on the morning of surgery     Myrbetriq 25 mg tablet extended release 24 hr  Generic drug: mirabegron  Medication Adjustments for Surgery: Take/Use as prescribed     * omeprazole 40 mg DR capsule  Commonly known as: PriLOSEC  Medication Adjustments for Surgery: Take on the morning of surgery     * omeprazole 40 mg DR capsule  Commonly known as: PriLOSEC  Take 1 capsule (40 mg) by mouth once daily in the morning. Take before meals. Do not crush or chew. Open capsule, sprinkle beads on SF jello, pudding or applesauce.  Additional Medication Adjustments for Surgery: Other (Comment)  Notes to patient: For after surgery     oxyCODONE 5 mg/5 mL solution  Commonly known as: Roxicodone  Take 5 mL (5 mg) by mouth every 6 hours if needed for severe pain (7 - 10) or moderate pain (4 - 6) for up to 7 days.  Additional Medication Adjustments for Surgery: Other (Comment)  Notes to patient: For  after surgery     simvastatin 10 mg tablet  Commonly known as: Zocor  Medication Adjustments for Surgery: Take/Use as prescribed     solifenacin 5 mg tablet  Commonly known as: VESIcare  Medication Adjustments for Surgery: Do Not take on the morning of surgery     sulfaSALAzine 500 mg tablet  Commonly known as: Azulfidine  Medication Adjustments for Surgery: Do Not take on the morning of surgery           * This list has 4 medication(s) that are the same as other medications prescribed for you. Read the directions carefully, and ask your doctor or other care provider to review them with you.

## 2025-05-15 NOTE — PROGRESS NOTES
This RD saw pt today to answer additional questions she had about protein shakes and fluids.  Pt's  present and supportive.   Recommend that pt drink 2 protein shakes daily to meet a goal of 60 g protein per day.    Recommend 50-64 oz fluid daily.    Reminded to chew well and eat slowly when starting the soft diet.    Pt verbalized understanding.

## 2025-05-20 LAB
ANABASINE UR-MCNC: <5 NG/ML
COTININE UR-MCNC: <15 NG/ML
NICOTINE UR-MCNC: <15 NG/ML
TRANS-3-OH-COTININE UR-MCNC: <50 NG/ML

## 2025-05-21 ENCOUNTER — ANESTHESIA (OUTPATIENT)
Dept: OPERATING ROOM | Facility: HOSPITAL | Age: 80
End: 2025-05-21
Payer: MEDICARE

## 2025-05-21 ENCOUNTER — HOSPITAL ENCOUNTER (INPATIENT)
Facility: HOSPITAL | Age: 80
Discharge: HOME | End: 2025-05-21
Attending: SURGERY | Admitting: SURGERY
Payer: MEDICARE

## 2025-05-21 ENCOUNTER — ANESTHESIA EVENT (OUTPATIENT)
Dept: OPERATING ROOM | Facility: HOSPITAL | Age: 80
End: 2025-05-21
Payer: MEDICARE

## 2025-05-21 DIAGNOSIS — R22.43 LOCALIZED SWELLING, MASS AND LUMP, LOWER LIMB, BILATERAL: ICD-10-CM

## 2025-05-21 DIAGNOSIS — R94.2 ABNORMAL RESULTS OF PULMONARY FUNCTION STUDIES: ICD-10-CM

## 2025-05-21 DIAGNOSIS — D72.829 LEUKOCYTOSIS, UNSPECIFIED TYPE: ICD-10-CM

## 2025-05-21 DIAGNOSIS — I10 HYPERTENSION, UNSPECIFIED TYPE: ICD-10-CM

## 2025-05-21 DIAGNOSIS — R19.8 PERFORATED VISCUS: ICD-10-CM

## 2025-05-21 DIAGNOSIS — R19.7 DIARRHEA, UNSPECIFIED TYPE: ICD-10-CM

## 2025-05-21 DIAGNOSIS — K44.9 HIATAL HERNIA: Primary | ICD-10-CM

## 2025-05-21 PROCEDURE — 3700000002 HC GENERAL ANESTHESIA TIME - EACH INCREMENTAL 1 MINUTE: Performed by: SURGERY

## 2025-05-21 PROCEDURE — 7100000001 HC RECOVERY ROOM TIME - INITIAL BASE CHARGE: Performed by: SURGERY

## 2025-05-21 PROCEDURE — 2500000004 HC RX 250 GENERAL PHARMACY W/ HCPCS (ALT 636 FOR OP/ED): Performed by: SURGERY

## 2025-05-21 PROCEDURE — 43282 LAP PARAESOPH HER RPR W/MESH: CPT | Performed by: SURGERY

## 2025-05-21 PROCEDURE — 7100000002 HC RECOVERY ROOM TIME - EACH INCREMENTAL 1 MINUTE: Performed by: SURGERY

## 2025-05-21 PROCEDURE — 2500000004 HC RX 250 GENERAL PHARMACY W/ HCPCS (ALT 636 FOR OP/ED): Mod: JZ

## 2025-05-21 PROCEDURE — 2500000002 HC RX 250 W HCPCS SELF ADMINISTERED DRUGS (ALT 637 FOR MEDICARE OP, ALT 636 FOR OP/ED): Performed by: SURGERY

## 2025-05-21 PROCEDURE — A43282 PR LAP, REPAIR PARAESOPHAGEAL HERNIA, INCL FUNDOPLASTY W/ MESH

## 2025-05-21 PROCEDURE — 2720000007 HC OR 272 NO HCPCS: Performed by: SURGERY

## 2025-05-21 PROCEDURE — 2500000005 HC RX 250 GENERAL PHARMACY W/O HCPCS: Mod: JZ | Performed by: SURGERY

## 2025-05-21 PROCEDURE — 0DV44ZZ RESTRICTION OF ESOPHAGOGASTRIC JUNCTION, PERCUTANEOUS ENDOSCOPIC APPROACH: ICD-10-PCS | Performed by: SURGERY

## 2025-05-21 PROCEDURE — 3700000001 HC GENERAL ANESTHESIA TIME - INITIAL BASE CHARGE: Performed by: SURGERY

## 2025-05-21 PROCEDURE — 96372 THER/PROPH/DIAG INJ SC/IM: CPT | Performed by: SURGERY

## 2025-05-21 PROCEDURE — 2780000003 HC OR 278 NO HCPCS: Performed by: SURGERY

## 2025-05-21 PROCEDURE — 7100000011 HC EXTENDED STAY RECOVERY HOURLY - NURSING UNIT

## 2025-05-21 PROCEDURE — 2500000001 HC RX 250 WO HCPCS SELF ADMINISTERED DRUGS (ALT 637 FOR MEDICARE OP): Performed by: SURGERY

## 2025-05-21 PROCEDURE — 3600000018 HC OR TIME - INITIAL BASE CHARGE - PROCEDURE LEVEL SIX: Performed by: SURGERY

## 2025-05-21 PROCEDURE — 2500000004 HC RX 250 GENERAL PHARMACY W/ HCPCS (ALT 636 FOR OP/ED): Mod: JZ | Performed by: SURGERY

## 2025-05-21 PROCEDURE — A43282 PR LAP, REPAIR PARAESOPHAGEAL HERNIA, INCL FUNDOPLASTY W/ MESH: Performed by: ANESTHESIOLOGY

## 2025-05-21 PROCEDURE — 3600000017 HC OR TIME - EACH INCREMENTAL 1 MINUTE - PROCEDURE LEVEL SIX: Performed by: SURGERY

## 2025-05-21 PROCEDURE — C1781 MESH (IMPLANTABLE): HCPCS | Performed by: SURGERY

## 2025-05-21 PROCEDURE — 0BUT4JZ SUPPLEMENT DIAPHRAGM WITH SYNTHETIC SUBSTITUTE, PERCUTANEOUS ENDOSCOPIC APPROACH: ICD-10-PCS | Performed by: SURGERY

## 2025-05-21 PROCEDURE — 99100 ANES PT EXTEME AGE<1 YR&>70: CPT | Performed by: ANESTHESIOLOGY

## 2025-05-21 PROCEDURE — 2500000004 HC RX 250 GENERAL PHARMACY W/ HCPCS (ALT 636 FOR OP/ED): Mod: JZ | Performed by: ANESTHESIOLOGY

## 2025-05-21 DEVICE — ENFORM PREPERITONEAL 10CMX10CM BIOMATERIAL
Type: IMPLANTABLE DEVICE | Site: ABDOMEN | Status: FUNCTIONAL
Brand: GORE ENFORM PREPERITONEAL BIOMATERIAL

## 2025-05-21 RX ORDER — METOCLOPRAMIDE HYDROCHLORIDE 5 MG/ML
10 INJECTION INTRAMUSCULAR; INTRAVENOUS ONCE AS NEEDED
Status: DISCONTINUED | OUTPATIENT
Start: 2025-05-21 | End: 2025-05-21 | Stop reason: HOSPADM

## 2025-05-21 RX ORDER — SULFASALAZINE 500 MG/1
500 TABLET ORAL 4 TIMES DAILY
Status: DISCONTINUED | OUTPATIENT
Start: 2025-05-21 | End: 2025-06-06 | Stop reason: HOSPADM

## 2025-05-21 RX ORDER — APREPITANT 40 MG/1
40 CAPSULE ORAL ONCE
Status: COMPLETED | OUTPATIENT
Start: 2025-05-21 | End: 2025-05-21

## 2025-05-21 RX ORDER — PANTOPRAZOLE SODIUM 40 MG/1
40 TABLET, DELAYED RELEASE ORAL
Status: DISCONTINUED | OUTPATIENT
Start: 2025-05-22 | End: 2025-06-06 | Stop reason: HOSPADM

## 2025-05-21 RX ORDER — SODIUM CHLORIDE, SODIUM LACTATE, POTASSIUM CHLORIDE, CALCIUM CHLORIDE 600; 310; 30; 20 MG/100ML; MG/100ML; MG/100ML; MG/100ML
100 INJECTION, SOLUTION INTRAVENOUS CONTINUOUS
Status: DISCONTINUED | OUTPATIENT
Start: 2025-05-21 | End: 2025-05-23

## 2025-05-21 RX ORDER — MORPHINE SULFATE 2 MG/ML
2 INJECTION, SOLUTION INTRAMUSCULAR; INTRAVENOUS EVERY 5 MIN PRN
Status: DISCONTINUED | OUTPATIENT
Start: 2025-05-21 | End: 2025-05-21 | Stop reason: HOSPADM

## 2025-05-21 RX ORDER — BISACODYL 5 MG
10 TABLET, DELAYED RELEASE (ENTERIC COATED) ORAL DAILY PRN
Status: DISCONTINUED | OUTPATIENT
Start: 2025-05-21 | End: 2025-06-06 | Stop reason: HOSPADM

## 2025-05-21 RX ORDER — HEPARIN SODIUM 5000 [USP'U]/ML
5000 INJECTION, SOLUTION INTRAVENOUS; SUBCUTANEOUS EVERY 8 HOURS
Status: DISCONTINUED | OUTPATIENT
Start: 2025-05-21 | End: 2025-05-24

## 2025-05-21 RX ORDER — AMLODIPINE BESYLATE 5 MG/1
5 TABLET ORAL DAILY
Status: DISCONTINUED | OUTPATIENT
Start: 2025-05-22 | End: 2025-06-06 | Stop reason: HOSPADM

## 2025-05-21 RX ORDER — CEFAZOLIN SODIUM 2 G/50ML
2 SOLUTION INTRAVENOUS ONCE
Status: COMPLETED | OUTPATIENT
Start: 2025-05-21 | End: 2025-05-21

## 2025-05-21 RX ORDER — ONDANSETRON HYDROCHLORIDE 2 MG/ML
4 INJECTION, SOLUTION INTRAVENOUS EVERY 8 HOURS PRN
Status: DISCONTINUED | OUTPATIENT
Start: 2025-05-21 | End: 2025-06-06 | Stop reason: HOSPADM

## 2025-05-21 RX ORDER — PHENYLEPHRINE HCL IN 0.9% NACL 1 MG/10 ML
SYRINGE (ML) INTRAVENOUS AS NEEDED
Status: DISCONTINUED | OUTPATIENT
Start: 2025-05-21 | End: 2025-05-21

## 2025-05-21 RX ORDER — FENTANYL CITRATE 50 UG/ML
INJECTION, SOLUTION INTRAMUSCULAR; INTRAVENOUS AS NEEDED
Status: DISCONTINUED | OUTPATIENT
Start: 2025-05-21 | End: 2025-05-21

## 2025-05-21 RX ORDER — METOPROLOL TARTRATE 1 MG/ML
5 INJECTION, SOLUTION INTRAVENOUS ONCE
Status: DISCONTINUED | OUTPATIENT
Start: 2025-05-21 | End: 2025-05-21 | Stop reason: HOSPADM

## 2025-05-21 RX ORDER — PANTOPRAZOLE SODIUM 40 MG/10ML
40 INJECTION, POWDER, LYOPHILIZED, FOR SOLUTION INTRAVENOUS
Status: DISCONTINUED | OUTPATIENT
Start: 2025-05-22 | End: 2025-06-06 | Stop reason: HOSPADM

## 2025-05-21 RX ORDER — HYDROMORPHONE HYDROCHLORIDE 1 MG/ML
1 INJECTION, SOLUTION INTRAMUSCULAR; INTRAVENOUS; SUBCUTANEOUS EVERY 5 MIN PRN
Status: DISCONTINUED | OUTPATIENT
Start: 2025-05-21 | End: 2025-05-21 | Stop reason: HOSPADM

## 2025-05-21 RX ORDER — ONDANSETRON 4 MG/1
4 TABLET, FILM COATED ORAL EVERY 8 HOURS PRN
Status: DISCONTINUED | OUTPATIENT
Start: 2025-05-21 | End: 2025-06-06 | Stop reason: HOSPADM

## 2025-05-21 RX ORDER — BUPIVACAINE HYDROCHLORIDE 2.5 MG/ML
INJECTION, SOLUTION INFILTRATION; PERINEURAL AS NEEDED
Status: DISCONTINUED | OUTPATIENT
Start: 2025-05-21 | End: 2025-05-21 | Stop reason: HOSPADM

## 2025-05-21 RX ORDER — OXYCODONE HCL 5 MG/5 ML
10 SOLUTION, ORAL ORAL EVERY 4 HOURS PRN
Status: DISCONTINUED | OUTPATIENT
Start: 2025-05-21 | End: 2025-05-24

## 2025-05-21 RX ORDER — ROCURONIUM BROMIDE 10 MG/ML
INJECTION, SOLUTION INTRAVENOUS AS NEEDED
Status: DISCONTINUED | OUTPATIENT
Start: 2025-05-21 | End: 2025-05-21

## 2025-05-21 RX ORDER — METOPROLOL TARTRATE 25 MG/1
12.5 TABLET, FILM COATED ORAL EVERY OTHER DAY
Status: DISCONTINUED | OUTPATIENT
Start: 2025-05-23 | End: 2025-05-24

## 2025-05-21 RX ORDER — PROPOFOL 10 MG/ML
INJECTION, EMULSION INTRAVENOUS AS NEEDED
Status: DISCONTINUED | OUTPATIENT
Start: 2025-05-21 | End: 2025-05-21

## 2025-05-21 RX ORDER — POLYETHYLENE GLYCOL 3350 17 G/17G
17 POWDER, FOR SOLUTION ORAL DAILY
Status: DISCONTINUED | OUTPATIENT
Start: 2025-05-21 | End: 2025-05-25

## 2025-05-21 RX ORDER — MIRABEGRON 25 MG/1
50 TABLET, FILM COATED, EXTENDED RELEASE ORAL DAILY
Status: DISCONTINUED | OUTPATIENT
Start: 2025-05-22 | End: 2025-05-23

## 2025-05-21 RX ORDER — ALBUTEROL SULFATE 0.83 MG/ML
2.5 SOLUTION RESPIRATORY (INHALATION) ONCE AS NEEDED
Status: DISCONTINUED | OUTPATIENT
Start: 2025-05-21 | End: 2025-05-21 | Stop reason: HOSPADM

## 2025-05-21 RX ORDER — SCOPOLAMINE 1 MG/3D
1 PATCH, EXTENDED RELEASE TRANSDERMAL ONCE
Status: DISCONTINUED | OUTPATIENT
Start: 2025-05-21 | End: 2025-05-24

## 2025-05-21 RX ORDER — MIDAZOLAM HYDROCHLORIDE 1 MG/ML
1 INJECTION, SOLUTION INTRAMUSCULAR; INTRAVENOUS ONCE AS NEEDED
Status: DISCONTINUED | OUTPATIENT
Start: 2025-05-21 | End: 2025-05-21 | Stop reason: HOSPADM

## 2025-05-21 RX ORDER — LABETALOL HYDROCHLORIDE 5 MG/ML
10 INJECTION, SOLUTION INTRAVENOUS ONCE AS NEEDED
Status: DISCONTINUED | OUTPATIENT
Start: 2025-05-21 | End: 2025-05-21 | Stop reason: HOSPADM

## 2025-05-21 RX ORDER — FAMOTIDINE 10 MG/ML
20 INJECTION, SOLUTION INTRAVENOUS ONCE
Status: DISCONTINUED | OUTPATIENT
Start: 2025-05-21 | End: 2025-05-21 | Stop reason: HOSPADM

## 2025-05-21 RX ORDER — OXYCODONE HCL 5 MG/5 ML
5 SOLUTION, ORAL ORAL EVERY 4 HOURS PRN
Status: DISCONTINUED | OUTPATIENT
Start: 2025-05-21 | End: 2025-05-24

## 2025-05-21 RX ORDER — SCOPOLAMINE 1 MG/3D
1 PATCH, EXTENDED RELEASE TRANSDERMAL ONCE
Status: DISCONTINUED | OUTPATIENT
Start: 2025-05-21 | End: 2025-05-21 | Stop reason: HOSPADM

## 2025-05-21 RX ORDER — LIDOCAINE HCL/PF 100 MG/5ML
SYRINGE (ML) INTRAVENOUS AS NEEDED
Status: DISCONTINUED | OUTPATIENT
Start: 2025-05-21 | End: 2025-05-21

## 2025-05-21 RX ORDER — DEXTROMETHORPHAN/PSEUDOEPHED 2.5-7.5/.8
40 DROPS ORAL 4 TIMES DAILY PRN
Status: DISCONTINUED | OUTPATIENT
Start: 2025-05-21 | End: 2025-06-01

## 2025-05-21 RX ORDER — DIPHENHYDRAMINE HYDROCHLORIDE 50 MG/ML
25 INJECTION, SOLUTION INTRAMUSCULAR; INTRAVENOUS ONCE AS NEEDED
Status: DISCONTINUED | OUTPATIENT
Start: 2025-05-21 | End: 2025-05-21 | Stop reason: HOSPADM

## 2025-05-21 RX ORDER — GABAPENTIN 300 MG/1
600 CAPSULE ORAL ONCE
Status: COMPLETED | OUTPATIENT
Start: 2025-05-21 | End: 2025-05-21

## 2025-05-21 RX ORDER — GABAPENTIN 300 MG/1
300 CAPSULE ORAL 3 TIMES DAILY
Status: DISCONTINUED | OUTPATIENT
Start: 2025-05-21 | End: 2025-05-22

## 2025-05-21 RX ORDER — ONDANSETRON HYDROCHLORIDE 2 MG/ML
4 INJECTION, SOLUTION INTRAVENOUS ONCE AS NEEDED
Status: DISCONTINUED | OUTPATIENT
Start: 2025-05-21 | End: 2025-05-21 | Stop reason: HOSPADM

## 2025-05-21 RX ORDER — SODIUM CHLORIDE 0.9 G/100ML
INJECTION, SOLUTION IRRIGATION AS NEEDED
Status: DISCONTINUED | OUTPATIENT
Start: 2025-05-21 | End: 2025-05-21 | Stop reason: HOSPADM

## 2025-05-21 RX ORDER — KETOROLAC TROMETHAMINE 30 MG/ML
15 INJECTION, SOLUTION INTRAMUSCULAR; INTRAVENOUS EVERY 6 HOURS
Status: DISCONTINUED | OUTPATIENT
Start: 2025-05-21 | End: 2025-05-23

## 2025-05-21 RX ORDER — ONDANSETRON HYDROCHLORIDE 2 MG/ML
INJECTION, SOLUTION INTRAVENOUS AS NEEDED
Status: DISCONTINUED | OUTPATIENT
Start: 2025-05-21 | End: 2025-05-21

## 2025-05-21 RX ORDER — ACETAMINOPHEN 325 MG/1
975 TABLET ORAL ONCE
Status: DISCONTINUED | OUTPATIENT
Start: 2025-05-21 | End: 2025-05-21 | Stop reason: HOSPADM

## 2025-05-21 RX ORDER — SODIUM CHLORIDE, SODIUM LACTATE, POTASSIUM CHLORIDE, CALCIUM CHLORIDE 600; 310; 30; 20 MG/100ML; MG/100ML; MG/100ML; MG/100ML
100 INJECTION, SOLUTION INTRAVENOUS CONTINUOUS
Status: ACTIVE | OUTPATIENT
Start: 2025-05-21 | End: 2025-05-22

## 2025-05-21 RX ORDER — HYDRALAZINE HYDROCHLORIDE 20 MG/ML
10 INJECTION INTRAMUSCULAR; INTRAVENOUS EVERY 30 MIN PRN
Status: DISCONTINUED | OUTPATIENT
Start: 2025-05-21 | End: 2025-05-21 | Stop reason: HOSPADM

## 2025-05-21 RX ORDER — CLONIDINE 100 UG/ML
INJECTION, SOLUTION EPIDURAL AS NEEDED
Status: DISCONTINUED | OUTPATIENT
Start: 2025-05-21 | End: 2025-05-21 | Stop reason: HOSPADM

## 2025-05-21 RX ORDER — GLYCOPYRROLATE 0.2 MG/ML
INJECTION INTRAMUSCULAR; INTRAVENOUS AS NEEDED
Status: DISCONTINUED | OUTPATIENT
Start: 2025-05-21 | End: 2025-05-21

## 2025-05-21 RX ORDER — ACETAMINOPHEN 10 MG/ML
1000 INJECTION, SOLUTION INTRAVENOUS EVERY 6 HOURS
Status: DISPENSED | OUTPATIENT
Start: 2025-05-21 | End: 2025-05-22

## 2025-05-21 RX ORDER — HEPARIN SODIUM 5000 [USP'U]/ML
5000 INJECTION, SOLUTION INTRAVENOUS; SUBCUTANEOUS ONCE
Status: COMPLETED | OUTPATIENT
Start: 2025-05-21 | End: 2025-05-21

## 2025-05-21 RX ADMIN — ROCURONIUM BROMIDE 20 MG: 50 INJECTION, SOLUTION INTRAVENOUS at 10:29

## 2025-05-21 RX ADMIN — HEPARIN SODIUM 5000 UNITS: 5000 INJECTION INTRAVENOUS; SUBCUTANEOUS at 09:02

## 2025-05-21 RX ADMIN — FENTANYL CITRATE 50 MCG: 50 INJECTION, SOLUTION INTRAMUSCULAR; INTRAVENOUS at 09:55

## 2025-05-21 RX ADMIN — KETOROLAC TROMETHAMINE 15 MG: 30 INJECTION, SOLUTION INTRAMUSCULAR at 21:30

## 2025-05-21 RX ADMIN — DEXAMETHASONE SODIUM PHOSPHATE 8 MG: 4 INJECTION, SOLUTION INTRAMUSCULAR; INTRAVENOUS at 10:08

## 2025-05-21 RX ADMIN — Medication 200 MCG: at 10:25

## 2025-05-21 RX ADMIN — HEPARIN SODIUM 5000 UNITS: 5000 INJECTION, SOLUTION INTRAVENOUS; SUBCUTANEOUS at 17:30

## 2025-05-21 RX ADMIN — SCOPOLAMINE 1 PATCH: 1.5 PATCH, EXTENDED RELEASE TRANSDERMAL at 09:02

## 2025-05-21 RX ADMIN — ONDANSETRON 4 MG: 2 INJECTION INTRAMUSCULAR; INTRAVENOUS at 15:44

## 2025-05-21 RX ADMIN — ROCURONIUM BROMIDE 50 MG: 50 INJECTION, SOLUTION INTRAVENOUS at 09:55

## 2025-05-21 RX ADMIN — LIDOCAINE HYDROCHLORIDE 100 MG: 20 INJECTION INTRAVENOUS at 09:55

## 2025-05-21 RX ADMIN — SODIUM CHLORIDE, SODIUM LACTATE, POTASSIUM CHLORIDE, AND CALCIUM CHLORIDE 100 ML/HR: .6; .31; .03; .02 INJECTION, SOLUTION INTRAVENOUS at 19:37

## 2025-05-21 RX ADMIN — SODIUM CHLORIDE, SODIUM LACTATE, POTASSIUM CHLORIDE, AND CALCIUM CHLORIDE 100 ML/HR: .6; .31; .03; .02 INJECTION, SOLUTION INTRAVENOUS at 15:23

## 2025-05-21 RX ADMIN — ACETAMINOPHEN 1000 MG: 10 INJECTION INTRAVENOUS at 17:29

## 2025-05-21 RX ADMIN — CEFAZOLIN SODIUM 2 G: 2 SOLUTION INTRAVENOUS at 10:02

## 2025-05-21 RX ADMIN — APREPITANT 40 MG: 40 CAPSULE ORAL at 09:12

## 2025-05-21 RX ADMIN — FENTANYL CITRATE 50 MCG: 50 INJECTION, SOLUTION INTRAMUSCULAR; INTRAVENOUS at 10:08

## 2025-05-21 RX ADMIN — GABAPENTIN 300 MG: 300 CAPSULE ORAL at 15:23

## 2025-05-21 RX ADMIN — SODIUM CHLORIDE, POTASSIUM CHLORIDE, SODIUM LACTATE AND CALCIUM CHLORIDE: 600; 310; 30; 20 INJECTION, SOLUTION INTRAVENOUS at 09:35

## 2025-05-21 RX ADMIN — GLYCOPYRROLATE 0.2 MG: 0.2 INJECTION, SOLUTION INTRAMUSCULAR; INTRAVENOUS at 10:18

## 2025-05-21 RX ADMIN — HYDROMORPHONE HYDROCHLORIDE 1 MG: 1 INJECTION, SOLUTION INTRAMUSCULAR; INTRAVENOUS; SUBCUTANEOUS at 11:44

## 2025-05-21 RX ADMIN — SUGAMMADEX 200 MG: 100 INJECTION, SOLUTION INTRAVENOUS at 11:07

## 2025-05-21 RX ADMIN — PROPOFOL 150 MG: 10 INJECTION, EMULSION INTRAVENOUS at 09:55

## 2025-05-21 RX ADMIN — SODIUM CHLORIDE, SODIUM LACTATE, POTASSIUM CHLORIDE, AND CALCIUM CHLORIDE 100 ML/HR: .6; .31; .03; .02 INJECTION, SOLUTION INTRAVENOUS at 12:27

## 2025-05-21 RX ADMIN — ONDANSETRON 4 MG: 2 INJECTION INTRAMUSCULAR; INTRAVENOUS at 11:02

## 2025-05-21 RX ADMIN — GABAPENTIN 600 MG: 300 CAPSULE ORAL at 09:02

## 2025-05-21 SDOH — ECONOMIC STABILITY: FOOD INSECURITY: WITHIN THE PAST 12 MONTHS, YOU WORRIED THAT YOUR FOOD WOULD RUN OUT BEFORE YOU GOT THE MONEY TO BUY MORE.: NEVER TRUE

## 2025-05-21 SDOH — HEALTH STABILITY: MENTAL HEALTH: CURRENT SMOKER: 0

## 2025-05-21 SDOH — SOCIAL STABILITY: SOCIAL INSECURITY
WITHIN THE LAST YEAR, HAVE YOU BEEN RAPED OR FORCED TO HAVE ANY KIND OF SEXUAL ACTIVITY BY YOUR PARTNER OR EX-PARTNER?: NO

## 2025-05-21 SDOH — ECONOMIC STABILITY: HOUSING INSECURITY: AT ANY TIME IN THE PAST 12 MONTHS, WERE YOU HOMELESS OR LIVING IN A SHELTER (INCLUDING NOW)?: NO

## 2025-05-21 SDOH — ECONOMIC STABILITY: HOUSING INSECURITY: IN THE LAST 12 MONTHS, WAS THERE A TIME WHEN YOU WERE NOT ABLE TO PAY THE MORTGAGE OR RENT ON TIME?: NO

## 2025-05-21 SDOH — SOCIAL STABILITY: SOCIAL INSECURITY: WITHIN THE LAST YEAR, HAVE YOU BEEN AFRAID OF YOUR PARTNER OR EX-PARTNER?: NO

## 2025-05-21 SDOH — ECONOMIC STABILITY: FOOD INSECURITY: HOW HARD IS IT FOR YOU TO PAY FOR THE VERY BASICS LIKE FOOD, HOUSING, MEDICAL CARE, AND HEATING?: NOT HARD AT ALL

## 2025-05-21 SDOH — SOCIAL STABILITY: SOCIAL INSECURITY: DOES ANYONE TRY TO KEEP YOU FROM HAVING/CONTACTING OTHER FRIENDS OR DOING THINGS OUTSIDE YOUR HOME?: NO

## 2025-05-21 SDOH — SOCIAL STABILITY: SOCIAL INSECURITY: HAVE YOU HAD ANY THOUGHTS OF HARMING ANYONE ELSE?: NO

## 2025-05-21 SDOH — SOCIAL STABILITY: SOCIAL INSECURITY: WERE YOU ABLE TO COMPLETE ALL THE BEHAVIORAL HEALTH SCREENINGS?: YES

## 2025-05-21 SDOH — SOCIAL STABILITY: SOCIAL INSECURITY: ABUSE: ADULT

## 2025-05-21 SDOH — ECONOMIC STABILITY: FOOD INSECURITY: WITHIN THE PAST 12 MONTHS, THE FOOD YOU BOUGHT JUST DIDN'T LAST AND YOU DIDN'T HAVE MONEY TO GET MORE.: NEVER TRUE

## 2025-05-21 SDOH — SOCIAL STABILITY: SOCIAL INSECURITY: DO YOU FEEL ANYONE HAS EXPLOITED OR TAKEN ADVANTAGE OF YOU FINANCIALLY OR OF YOUR PERSONAL PROPERTY?: NO

## 2025-05-21 SDOH — ECONOMIC STABILITY: INCOME INSECURITY: IN THE PAST 12 MONTHS HAS THE ELECTRIC, GAS, OIL, OR WATER COMPANY THREATENED TO SHUT OFF SERVICES IN YOUR HOME?: NO

## 2025-05-21 SDOH — SOCIAL STABILITY: SOCIAL INSECURITY: WITHIN THE LAST YEAR, HAVE YOU BEEN HUMILIATED OR EMOTIONALLY ABUSED IN OTHER WAYS BY YOUR PARTNER OR EX-PARTNER?: NO

## 2025-05-21 SDOH — SOCIAL STABILITY: SOCIAL INSECURITY: ARE YOU OR HAVE YOU BEEN THREATENED OR ABUSED PHYSICALLY, EMOTIONALLY, OR SEXUALLY BY ANYONE?: NO

## 2025-05-21 SDOH — SOCIAL STABILITY: SOCIAL INSECURITY
WITHIN THE LAST YEAR, HAVE YOU BEEN KICKED, HIT, SLAPPED, OR OTHERWISE PHYSICALLY HURT BY YOUR PARTNER OR EX-PARTNER?: NO

## 2025-05-21 SDOH — SOCIAL STABILITY: SOCIAL INSECURITY: ARE THERE ANY APPARENT SIGNS OF INJURIES/BEHAVIORS THAT COULD BE RELATED TO ABUSE/NEGLECT?: NO

## 2025-05-21 SDOH — ECONOMIC STABILITY: HOUSING INSECURITY: IN THE PAST 12 MONTHS, HOW MANY TIMES HAVE YOU MOVED WHERE YOU WERE LIVING?: 0

## 2025-05-21 SDOH — ECONOMIC STABILITY: TRANSPORTATION INSECURITY: IN THE PAST 12 MONTHS, HAS LACK OF TRANSPORTATION KEPT YOU FROM MEDICAL APPOINTMENTS OR FROM GETTING MEDICATIONS?: NO

## 2025-05-21 SDOH — SOCIAL STABILITY: SOCIAL INSECURITY: DO YOU FEEL UNSAFE GOING BACK TO THE PLACE WHERE YOU ARE LIVING?: NO

## 2025-05-21 SDOH — SOCIAL STABILITY: SOCIAL INSECURITY: HAVE YOU HAD THOUGHTS OF HARMING ANYONE ELSE?: NO

## 2025-05-21 SDOH — ECONOMIC STABILITY: HOUSING INSECURITY: DO YOU FEEL UNSAFE GOING BACK TO THE PLACE WHERE YOU LIVE?: NO

## 2025-05-21 SDOH — SOCIAL STABILITY: SOCIAL INSECURITY: HAS ANYONE EVER THREATENED TO HURT YOUR FAMILY OR YOUR PETS?: NO

## 2025-05-21 ASSESSMENT — COGNITIVE AND FUNCTIONAL STATUS - GENERAL
MOVING TO AND FROM BED TO CHAIR: A LITTLE
DRESSING REGULAR LOWER BODY CLOTHING: A LITTLE
CLIMB 3 TO 5 STEPS WITH RAILING: A LITTLE
TURNING FROM BACK TO SIDE WHILE IN FLAT BAD: A LITTLE
TURNING FROM BACK TO SIDE WHILE IN FLAT BAD: A LITTLE
HELP NEEDED FOR BATHING: A LITTLE
MOBILITY SCORE: 18
WALKING IN HOSPITAL ROOM: A LITTLE
MOBILITY SCORE: 18
DRESSING REGULAR LOWER BODY CLOTHING: A LITTLE
STANDING UP FROM CHAIR USING ARMS: A LITTLE
DAILY ACTIVITIY SCORE: 20
MOVING FROM LYING ON BACK TO SITTING ON SIDE OF FLAT BED WITH BEDRAILS: A LITTLE
TOILETING: A LITTLE
MOVING TO AND FROM BED TO CHAIR: A LITTLE
MOVING FROM LYING ON BACK TO SITTING ON SIDE OF FLAT BED WITH BEDRAILS: A LITTLE
TOILETING: A LITTLE
STANDING UP FROM CHAIR USING ARMS: A LITTLE
DRESSING REGULAR UPPER BODY CLOTHING: A LITTLE
CLIMB 3 TO 5 STEPS WITH RAILING: A LITTLE
HELP NEEDED FOR BATHING: A LITTLE
WALKING IN HOSPITAL ROOM: A LITTLE
DAILY ACTIVITIY SCORE: 20
PATIENT BASELINE BEDBOUND: NO
DRESSING REGULAR UPPER BODY CLOTHING: A LITTLE

## 2025-05-21 ASSESSMENT — PAIN SCALES - GENERAL
PAINLEVEL_OUTOF10: 4
PAINLEVEL_OUTOF10: 7
PAINLEVEL_OUTOF10: 4
PAINLEVEL_OUTOF10: 5 - MODERATE PAIN
PAINLEVEL_OUTOF10: 0 - NO PAIN
PAINLEVEL_OUTOF10: 5 - MODERATE PAIN

## 2025-05-21 ASSESSMENT — PAIN - FUNCTIONAL ASSESSMENT
PAIN_FUNCTIONAL_ASSESSMENT: 0-10

## 2025-05-21 ASSESSMENT — LIFESTYLE VARIABLES
SKIP TO QUESTIONS 9-10: 1
AUDIT-C TOTAL SCORE: 0
HOW OFTEN DO YOU HAVE A DRINK CONTAINING ALCOHOL: NEVER
HOW OFTEN DO YOU HAVE 6 OR MORE DRINKS ON ONE OCCASION: NEVER
SUBSTANCE_ABUSE_PAST_12_MONTHS: NO
HOW MANY STANDARD DRINKS CONTAINING ALCOHOL DO YOU HAVE ON A TYPICAL DAY: PATIENT DOES NOT DRINK
AUDIT-C TOTAL SCORE: 0
PRESCIPTION_ABUSE_PAST_12_MONTHS: NO

## 2025-05-21 ASSESSMENT — ACTIVITIES OF DAILY LIVING (ADL)
TOILETING: NEEDS ASSISTANCE
ASSISTIVE_DEVICE: WALKER
LACK_OF_TRANSPORTATION: NO
PATIENT'S MEMORY ADEQUATE TO SAFELY COMPLETE DAILY ACTIVITIES?: YES
LACK_OF_TRANSPORTATION: NO
WALKS IN HOME: INDEPENDENT
DRESSING YOURSELF: INDEPENDENT
HEARING - RIGHT EAR: FUNCTIONAL
HEARING - LEFT EAR: FUNCTIONAL
FEEDING YOURSELF: INDEPENDENT
JUDGMENT_ADEQUATE_SAFELY_COMPLETE_DAILY_ACTIVITIES: YES
GROOMING: INDEPENDENT
ADEQUATE_TO_COMPLETE_ADL: YES
BATHING: INDEPENDENT

## 2025-05-21 ASSESSMENT — PATIENT HEALTH QUESTIONNAIRE - PHQ9
1. LITTLE INTEREST OR PLEASURE IN DOING THINGS: NOT AT ALL
2. FEELING DOWN, DEPRESSED OR HOPELESS: NOT AT ALL
SUM OF ALL RESPONSES TO PHQ9 QUESTIONS 1 & 2: 0

## 2025-05-21 ASSESSMENT — COLUMBIA-SUICIDE SEVERITY RATING SCALE - C-SSRS
6. HAVE YOU EVER DONE ANYTHING, STARTED TO DO ANYTHING, OR PREPARED TO DO ANYTHING TO END YOUR LIFE?: NO
1. IN THE PAST MONTH, HAVE YOU WISHED YOU WERE DEAD OR WISHED YOU COULD GO TO SLEEP AND NOT WAKE UP?: NO
2. HAVE YOU ACTUALLY HAD ANY THOUGHTS OF KILLING YOURSELF?: NO

## 2025-05-21 ASSESSMENT — PAIN DESCRIPTION - ORIENTATION: ORIENTATION: ANTERIOR

## 2025-05-21 ASSESSMENT — PAIN DESCRIPTION - LOCATION: LOCATION: ABDOMEN

## 2025-05-21 NOTE — OP NOTE
"LAPAROSCOPIC ASSISTED HIATAL HERNIA REPAIR WITH FUNDOPLICATION / EGD / TAP BLOCK Operative Note     Date: 2025  OR Location: [facility]    Name: Beatriz Raymond \"Ginny\", : 1945, Age: 79 y.o., MRN: [id number], Sex: female    Diagnosis  Pre-op Diagnosis      * Hiatal hernia [K44.9] Post-op Diagnosis     * Hiatal hernia [K44.9]     Procedures  LAPAROSCOPIC ASSISTED HIATAL HERNIA REPAIR WITH FUNDOPLICATION / EGD / TAP BLOCK  00988 - PA LAPS RPR PARAESPHGL HRNA INCL FUNDPLSTY W/MESH    PA EGD TRANSORAL BIOPSY SINGLE/MULTIPLE [26499]  Surgeons      * Alexander Barfield - Primary    Resident/Fellow/Other Assistant:  Surgeons and Role:     * Eliud Hutton MD - Fellow    Staff:   Circulator: Christen Cash Person: Christen  Surgical Assistant: Flora  Surgical Assistant: Danilo Cash Person: Flora    Anesthesia Staff: Anesthesiologist: Dwayne Sahni MD  C-AA: JOSH Dobbins    Procedure Summary  Anesthesia: General  ASA: III  Estimated Blood Loss: 20mL  Intra-op Medications:   Administrations occurring from 1000 to 1250 on 25:   Medication Name Total Dose   BUPivacaine HCl (Marcaine) 0.25 % (2.5 mg/mL) injection 50 mL   sodium chloride 0.9 % irrigation solution 1,000 mL   cloNIDine PF (Duraclon) injection 2 mL   ceFAZolin (Ancef) 2 g in dextrose (iso) IV 50 mL 2 g   dexAMETHasone (Decadron) 4 mg/mL IV Syringe 2 mL 8 mg   fentaNYL (Sublimaze) injection 50 mcg/mL 50 mcg   glycopyrrolate (Robinul) 0.2 mg/mL injection VIAL 0.2 mg   LR bolus Cannot be calculated   ondansetron (Zofran) 2 mg/mL injection 4 mg   phenylephrine 100 mcg/mL syringe 10 mL (prefilled) 200 mcg   rocuronium (ZeMuron) 50 mg/5 mL injection 20 mg   sugammadex (Bridion) 200 mg/2 mL injection 200 mg              Anesthesia Record               Intraprocedure I/O Totals          Intake    LR bolus 1000.00 mL    Total Intake 1000 mL          Specimen: No specimens collected              Drains and/or Catheters: * None in log *    Tourniquet Times:    " "     Implants:  Implants       Type Name Action Serial No.      Implant DEISY ALLEN PREPERITONEAL, 10 X 10 CM - [id number] Implanted [id number]              Findings: Moderate sized hiatal hernia    Indications: Beatriz Raymond \"Slava" is an 79 y.o. female who is having surgery for Hiatal hernia [K44.9].  GERD    The patient was seen in the preoperative area. The risks, benefits, complications, treatment options, non-operative alternatives, expected recovery and outcomes were discussed with the patient. The possibilities of reaction to medication, pulmonary aspiration, injury to surrounding structures, bleeding, recurrent infection, the need for additional procedures, failure to diagnose a condition, and creating a complication requiring transfusion or operation were discussed with the patient. The patient concurred with the proposed plan, giving informed consent.  The site of surgery was properly noted/marked if necessary per policy. The patient has been actively warmed in preoperative area. Preoperative antibiotics have been ordered and given within 1 hours of incision. Venous thrombosis prophylaxis have been ordered including bilateral sequential compression devices and chemical prophylaxis    Procedure Details: Patient is a 79-year-old female with significant reflux history and was referred by gastroenterology for hiatal hernia repair and fundoplication for symptomatic paraesophageal hiatal hernia.  Detailed discussions were held regarding options and patient was scheduled on elective basis for repair of paraesophageal hiatal hernia, fundoplication and related procedures.  On the day of surgery after verification of informed consent she was given subcu heparin, was taken to the operating room, placed in supine position on the table, preoperative huddle was done, antibiotics were given, general anesthesia was established, sterile preparation and draping of abdominal wall was performed.  Preincision " timeout was done.  Abdominal cavity was entered using 5 mm optical trocar in the left upper quadrant without technical problems.  Pneumoperitoneum was established, there was no iatrogenic injuries.  There were adhesions of the omentum to the abdominal wall.  5 mm port was placed in the left axillary line and using scissors and LigaSure these adhesions of the omentum and the periumbilical and upper abdominal area were taken down.  They were all very thin filmsy adhesions, there was no bowel involvement.  12 mm port was placed to the right side of the umbilicus and 5 mm port in the right subcostal margin.  Left lower abdomen was retracted with Shira through the epigastrium.  Patient was placed in reverse Trendelenburg position.  There was an obvious sick centimeter hiatal hernia mostly at the anterior aspect of the crura.  Dissection was started on the right side pars flaccida was divided, dissection continued onto the medial aspect of the right ryley then phrenoesophageal membrane was divided and dissection continued onto the left ryley.  Short gastric vessels were divided to free up the gastric fundus.  Posterior dissection was then completed.  Both anterior and posterior vagus nerves were identified and preserved.  Distal esophagus was then circumferentially dissected for many centimeters in the mediastinum to obtain at least 3 cm of intra-abdominal esophageal length.  Once the dissection was completed now we looked at the crural defect it was mostly in the anterior aspect of the crura we placed 1 figure-of-eight 0 Surgidek suture posteriorly and I noted if we place sutures posteriorly it would not create angulation of the esophagus we then placed 2 figure-of-eight 0 Surgidek sutures anteriorly leaving enough space at the hiatus to make sure no excessive narrowing was created.  54 Portuguese bougie was passed per anesthesiologist and guided into the stomach and use for calibration of hiatal closure.  Gastric fundus  was then brought using shoeshine maneuver and a 360 degree Nissen fundoplication and floppy manner was created with 3 interrupted 2-0 Surgidac sutures.  Most proximal 1 incorporated with the GE junction area of the esophagus as well.  We made sure there was a floppy Nissen.  Bougie was then removed.  A Emelle endoform free peritoneal bio material mesh 10 x 10 sheet was cut in C configuration and introduced into abdominal cavity and used to augment the crural repair.  It was secured with 2 absorbable Polysorb 2-0 sutures to the underlying crura.  Endoscopy was performed now scope was passed through the GE junction easily, there was no residual hiatal hernia, there was no evidence of any damage or any air bubbles under waterseal.  Retroflexion revealed a nicely created wrap.  Scope was removed fluid was aspirated.  Tap block was performed on both sides under laparoscopic vision.  Fascial defect at 12 mm port site was closed with interrupted 0 Vicryl sutures using Endo passer under laparoscopic vision.  After final status examination abdominal cavity there was no active bleeding or any iatrogenic injuries.  Liver retractor and trocars were removed under laparoscopic vision.  Skin was closed with 3-0 Monocryl and Dermabond was applied.  Instrument, needle, sponge counts were correct at the conclusion of the operation.  Patient tolerated the operation well, was extubated in the OR and transferred to recovery room in stable condition.  Spouse was updated with the findings.    Dr. Hutton was scrubbed and actively assisted in the entire laparoscopic, endoscopic components of the operation.  There was no qualified resident available.    Evidence of Infection:   Complications:  None; patient tolerated the procedure well.    Disposition: PACU - hemodynamically stable.  Condition: stable     Task Performed by RNFA or Surgical Assistant:  Assistance with camera holding and skin closure.    Additional Details:     Attending  Attestation:     Alexander Barfield  Phone Number: [phone]

## 2025-05-21 NOTE — CARE PLAN
Problem: Pain  Goal: Takes deep breaths with improved pain control throughout the shift  Outcome: Progressing  Goal: Turns in bed with improved pain control throughout the shift  Outcome: Progressing  Goal: Walks with improved pain control throughout the shift  Outcome: Progressing  Goal: Performs ADL's with improved pain control throughout shift  Outcome: Progressing  Goal: Participates in PT with improved pain control throughout the shift  Outcome: Progressing  Goal: Free from opioid side effects throughout the shift  Outcome: Progressing  Goal: Free from acute confusion related to pain meds throughout the shift  Outcome: Progressing     Problem: Skin  Goal: Decreased wound size/increased tissue granulation at next dressing change  5/21/2025 1305 by Tresa Hansen RN  Flowsheets (Taken 5/21/2025 1305)  Decreased wound size/increased tissue granulation at next dressing change: Promote sleep for wound healing  5/21/2025 1305 by Tresa Hansen RN  Outcome: Progressing  Flowsheets (Taken 5/21/2025 1305)  Decreased wound size/increased tissue granulation at next dressing change: Promote sleep for wound healing  Goal: Participates in plan/prevention/treatment measures  5/21/2025 1305 by Tresa Hansen RN  Flowsheets (Taken 5/21/2025 1305)  Participates in plan/prevention/treatment measures: Increase activity/out of bed for meals  5/21/2025 1305 by Tresa Hansen RN  Outcome: Progressing  Flowsheets (Taken 5/21/2025 1305)  Participates in plan/prevention/treatment measures: Increase activity/out of bed for meals  Goal: Prevent/manage excess moisture  5/21/2025 1305 by Tresa Hansen RN  Flowsheets (Taken 5/21/2025 1305)  Prevent/manage excess moisture: Moisturize dry skin  5/21/2025 1305 by Tresa Hansen RN  Outcome: Progressing  Flowsheets (Taken 5/21/2025 1305)  Prevent/manage excess moisture: Moisturize dry skin  Goal: Prevent/minimize sheer/friction injuries  5/21/2025 1305 by Tresa Hansen RN  Flowsheets (Taken  5/21/2025 1305)  Prevent/minimize sheer/friction injuries: Increase activity/out of bed for meals  5/21/2025 1305 by Tresa Hansen RN  Outcome: Progressing  Flowsheets (Taken 5/21/2025 1305)  Prevent/minimize sheer/friction injuries: Increase activity/out of bed for meals  Goal: Promote/optimize nutrition  5/21/2025 1305 by Tresa Hansen RN  Flowsheets (Taken 5/21/2025 1305)  Promote/optimize nutrition: Monitor/record intake including meals  5/21/2025 1305 by Tresa Hansen RN  Outcome: Progressing  Flowsheets (Taken 5/21/2025 1305)  Promote/optimize nutrition: Monitor/record intake including meals  Goal: Promote skin healing  5/21/2025 1305 by Tresa Hansen RN  Flowsheets (Taken 5/21/2025 1305)  Promote skin healing: Turn/reposition every 2 hours/use positioning/transfer devices  5/21/2025 1305 by Tresa Hansen RN  Outcome: Progressing  Flowsheets (Taken 5/21/2025 1305)  Promote skin healing: Turn/reposition every 2 hours/use positioning/transfer devices     Problem: Pain  Goal: Takes deep breaths with improved pain control throughout the shift  5/21/2025 1306 by Tresa Hansen RN  Outcome: Progressing  5/21/2025 1305 by Tresa Hansen RN  Outcome: Progressing  Goal: Turns in bed with improved pain control throughout the shift  5/21/2025 1306 by Tresa Hansen RN  Outcome: Progressing  5/21/2025 1305 by Tresa Hansen RN  Outcome: Progressing  Goal: Walks with improved pain control throughout the shift  5/21/2025 1306 by Tresa Hansen RN  Outcome: Progressing  5/21/2025 1305 by Tresa Hansen RN  Outcome: Progressing  Goal: Performs ADL's with improved pain control throughout shift  5/21/2025 1306 by Tresa Hansen RN  Outcome: Progressing  5/21/2025 1305 by Tresa Hansen RN  Outcome: Progressing  Goal: Participates in PT with improved pain control throughout the shift  5/21/2025 1306 by Tresa Hansen RN  Outcome: Progressing  5/21/2025 1305 by Tresa Hansen, RN  Outcome: Progressing  Goal: Free from opioid  side effects throughout the shift  5/21/2025 1306 by Tresa Hansen RN  Outcome: Progressing  5/21/2025 1305 by Tresa Hansen RN  Outcome: Progressing  Goal: Free from acute confusion related to pain meds throughout the shift  5/21/2025 1306 by Tresa Hansen RN  Outcome: Progressing  5/21/2025 1305 by Tresa Hansen RN  Outcome: Progressing

## 2025-05-21 NOTE — ANESTHESIA POSTPROCEDURE EVALUATION
"Patient: Beatriz Raymond \"Ginny\"    Procedure Summary       Date: 05/21/25 Room / Location: [facility] 09 / [facility]    Anesthesia Start: 0935 Anesthesia Stop: 1120    Procedure: LAPAROSCOPIC ASSISTED HIATAL HERNIA REPAIR WITH FUNDOPLICATION / EGD / TAP BLOCK Diagnosis:       Hiatal hernia      (Hiatal hernia [K44.9])    Surgeons: Alexander Barfield MD Responsible Provider: Dwayne Sahni MD    Anesthesia Type: general ASA Status: 3            Anesthesia Type: general    Vitals Value Taken Time   /72 05/21/25 11:20   Temp 36.2 05/21/25 11:20   Pulse 78 05/21/25 11:19   Resp 16 05/21/25 11:20   SpO2 100 % 05/21/25 11:19   Vitals shown include unfiled device data.    Anesthesia Post Evaluation    Patient location during evaluation: PACU  Patient participation: complete - patient participated  Level of consciousness: sleepy but conscious  Pain management: adequate  Airway patency: patent  Cardiovascular status: hemodynamically stable and acceptable  Respiratory status: spontaneous ventilation and face mask  Hydration status: acceptable  Postoperative Nausea and Vomiting: none        There were no known notable events for this encounter.    "

## 2025-05-21 NOTE — Clinical Note
Patient positioned for aspiration of abscess in ct; sedation given  per dr. Hopkins. Being prepped at this time and scanned

## 2025-05-21 NOTE — ANESTHESIA PREPROCEDURE EVALUATION
"Patient: Beatriz Raymond \"Ginny\"    Procedure Information       Date/Time: 05/21/25 1000    Procedure: ROBOTIC ASSISTED HIATAL HERNIA REPAIR WITH FUNDOPLICATION    Location: [facility] 09 / [facility]    Surgeons: Alexander Barfield MD            Relevant Problems   Anesthesia  Sore throat after General Anesthesia   (-) Difficult intubation   (-) PONV (postoperative nausea and vomiting)      Cardiac   (+) Hypertension   (+) Pure hypercholesterolemia   (+) Systolic murmur      GI   (+) Gastroesophageal reflux disease without esophagitis   (+) Hiatal hernia      /Renal   (+) Hydronephrosis      Hematology   (+) Anemia      Musculoskeletal   (+) Osteoarthritis       Clinical information reviewed:    Allergies  Meds               NPO Detail:  NPO/Void Status  Carbohydrate Drink Given Prior to Surgery? : N  Date of Last Liquid: 05/21/25  Time of Last Liquid: 0730  Date of Last Solid: 05/19/25  Time of Last Solid: 1800  Last Intake Type: Clear fluids  Time of Last Void: 0848         Physical Exam    Airway  Mallampati: II  TM distance: >3 FB  Neck ROM: full     Cardiovascular - normal exam  Rhythm: regular  Rate: normal     Dental - normal exam     Pulmonary - normal exam   Abdominal            Anesthesia Plan    History of general anesthesia?: yes  History of complications of general anesthesia?: no    ASA 3     general     The patient is not a current smoker.  Education provided regarding risk of obstructive sleep apnea.  intravenous induction   Postoperative pain plan includes opioids.  Trial extubation is planned.  Anesthetic plan and risks discussed with patient.  Use of blood products discussed with patient who.    Plan discussed with CRNA, CAA and attending.      "

## 2025-05-21 NOTE — ANESTHESIA PROCEDURE NOTES
Airway  Date/Time: 5/21/2025 10:01 AM  Reason: elective    Airway not difficult    Staffing  Performed: JOSH   Authorized by: Dwayne Sahni MD    Performed by: JOSH Dobbins  Patient location during procedure: OR    Patient Condition  Indications for airway management: anesthesia  Patient position: sniffing  Planned trial extubation  Sedation level: deep     Final Airway Details   Preoxygenated: yes  Final airway type: endotracheal airway  Successful airway: ETT  Cuffed: yes   Successful intubation technique: direct laryngoscopy  Adjuncts used in placement: intubating stylet  Endotracheal tube insertion site: oral  Blade: Bj  Blade size: #3  ETT size (mm): 6.5  Cormack-Lehane Classification: grade I - full view of glottis  Placement verified by: capnometry   Measured from: lips  Number of attempts at approach: 1

## 2025-05-21 NOTE — BRIEF OP NOTE
"Date: 2025  OR Location: [facility] OR    Name: Beatriz Raymond \"Slava", : 1945, Age: 79 y.o., MRN: [id number], Sex: female    Diagnosis  Pre-op Diagnosis      * Hiatal hernia [K44.9] Post-op Diagnosis     * Hiatal hernia [K44.9]     Procedures  LAPAROSCOPIC ASSISTED HIATAL HERNIA REPAIR WITH FUNDOPLICATION / EGD / TAP BLOCK  95750 - NC LAPS RPR PARAESPHGL HRNA INCL FUNDPLSTY W/MESH    NC EGD TRANSORAL BIOPSY SINGLE/MULTIPLE [68359]  Surgeons      * Alexander Barfield - Primary    Resident/Fellow/Other Assistant:  Surgeons and Role:     * Eliud Hutton MD - Fellow    Staff:   Circulator: Christen Cash Person: Christen  Surgical Assistant: Flora  Surgical Assistant: Danilo    Anesthesia Staff: Anesthesiologist: Dwayne Sahni MD  C-AA: JOSH Dobbins    Procedure Summary  Anesthesia: General  ASA: III  Estimated Blood Loss: 15mL  Intra-op Medications:   Administrations occurring from 1000 to 1250 on 25:   Medication Name Total Dose   BUPivacaine HCl (Marcaine) 0.25 % (2.5 mg/mL) injection 50 mL   sodium chloride 0.9 % irrigation solution 1,000 mL   cloNIDine PF (Duraclon) injection 2 mL   dexAMETHasone (Decadron) 4 mg/mL IV Syringe 2 mL 8 mg   fentaNYL (Sublimaze) injection 50 mcg/mL 50 mcg   glycopyrrolate (Robinul) 0.2 mg/mL injection VIAL 0.2 mg   phenylephrine 100 mcg/mL syringe 10 mL (prefilled) 200 mcg   rocuronium (ZeMuron) 50 mg/5 mL injection 20 mg   ceFAZolin (Ancef) 2 g in dextrose (iso) IV 50 mL 2 g              Anesthesia Record               Intraprocedure I/O Totals       None           Specimen: No specimens collected               Findings: paraesophageal hernia, weak and flimsy crural tissue, locoregional adhesions at the hiatus    Complications:  None; patient tolerated the procedure well.     Disposition: PACU - hemodynamically stable.  Condition: stable  Specimens Collected: No specimens collected  Attending Attestation: I was present and scrubbed for the entire procedure.    Alexander" Lico  Phone Number: [phone]

## 2025-05-22 ENCOUNTER — APPOINTMENT (OUTPATIENT)
Dept: RADIOLOGY | Facility: HOSPITAL | Age: 80
End: 2025-05-22
Payer: MEDICARE

## 2025-05-22 LAB
ALBUMIN SERPL BCP-MCNC: 3.3 G/DL (ref 3.4–5)
ALP SERPL-CCNC: 53 U/L (ref 33–136)
ALT SERPL W P-5'-P-CCNC: 15 U/L (ref 7–45)
ANION GAP SERPL CALC-SCNC: 16 MMOL/L (ref 10–20)
AST SERPL W P-5'-P-CCNC: 12 U/L (ref 9–39)
BILIRUB SERPL-MCNC: 0.3 MG/DL (ref 0–1.2)
BUN SERPL-MCNC: 21 MG/DL (ref 6–23)
CALCIUM SERPL-MCNC: 8.1 MG/DL (ref 8.6–10.3)
CHLORIDE SERPL-SCNC: 106 MMOL/L (ref 98–107)
CO2 SERPL-SCNC: 22 MMOL/L (ref 21–32)
CREAT SERPL-MCNC: 1.1 MG/DL (ref 0.5–1.05)
EGFRCR SERPLBLD CKD-EPI 2021: 52 ML/MIN/1.73M*2
ERYTHROCYTE [DISTWIDTH] IN BLOOD BY AUTOMATED COUNT: 13.2 % (ref 11.5–14.5)
GLUCOSE SERPL-MCNC: 75 MG/DL (ref 74–99)
HCT VFR BLD AUTO: 28.3 % (ref 36–46)
HGB BLD-MCNC: 8.1 G/DL (ref 12–16)
MCH RBC QN AUTO: 29.7 PG (ref 26–34)
MCHC RBC AUTO-ENTMCNC: 28.6 G/DL (ref 32–36)
MCV RBC AUTO: 104 FL (ref 80–100)
NRBC BLD-RTO: 0 /100 WBCS (ref 0–0)
PLATELET # BLD AUTO: 216 X10*3/UL (ref 150–450)
POTASSIUM SERPL-SCNC: 4.5 MMOL/L (ref 3.5–5.3)
PROT SERPL-MCNC: 5.2 G/DL (ref 6.4–8.2)
RBC # BLD AUTO: 2.73 X10*6/UL (ref 4–5.2)
SODIUM SERPL-SCNC: 139 MMOL/L (ref 136–145)
WBC # BLD AUTO: 6.8 X10*3/UL (ref 4.4–11.3)

## 2025-05-22 PROCEDURE — 85027 COMPLETE CBC AUTOMATED: CPT | Performed by: SURGERY

## 2025-05-22 PROCEDURE — 2500000002 HC RX 250 W HCPCS SELF ADMINISTERED DRUGS (ALT 637 FOR MEDICARE OP, ALT 636 FOR OP/ED): Performed by: SURGERY

## 2025-05-22 PROCEDURE — 2500000001 HC RX 250 WO HCPCS SELF ADMINISTERED DRUGS (ALT 637 FOR MEDICARE OP): Performed by: SURGERY

## 2025-05-22 PROCEDURE — 74240 X-RAY XM UPR GI TRC 1CNTRST: CPT

## 2025-05-22 PROCEDURE — 7100000011 HC EXTENDED STAY RECOVERY HOURLY - NURSING UNIT

## 2025-05-22 PROCEDURE — 36415 COLL VENOUS BLD VENIPUNCTURE: CPT | Performed by: SURGERY

## 2025-05-22 PROCEDURE — 2550000001 HC RX 255 CONTRASTS: Performed by: SURGERY

## 2025-05-22 PROCEDURE — 80053 COMPREHEN METABOLIC PANEL: CPT | Performed by: SURGERY

## 2025-05-22 PROCEDURE — 74240 X-RAY XM UPR GI TRC 1CNTRST: CPT | Performed by: STUDENT IN AN ORGANIZED HEALTH CARE EDUCATION/TRAINING PROGRAM

## 2025-05-22 PROCEDURE — 99024 POSTOP FOLLOW-UP VISIT: CPT | Performed by: SURGERY

## 2025-05-22 PROCEDURE — 96372 THER/PROPH/DIAG INJ SC/IM: CPT | Performed by: SURGERY

## 2025-05-22 PROCEDURE — 2500000004 HC RX 250 GENERAL PHARMACY W/ HCPCS (ALT 636 FOR OP/ED): Performed by: SURGERY

## 2025-05-22 RX ORDER — ACETAMINOPHEN 10 MG/ML
1000 INJECTION, SOLUTION INTRAVENOUS EVERY 6 HOURS
Status: DISPENSED | OUTPATIENT
Start: 2025-05-22 | End: 2025-05-23

## 2025-05-22 RX ORDER — DIATRIZOATE MEGLUMINE AND DIATRIZOATE SODIUM 660; 100 MG/ML; MG/ML
15 SOLUTION ORAL; RECTAL ONCE
Status: COMPLETED | OUTPATIENT
Start: 2025-05-22 | End: 2025-05-22

## 2025-05-22 RX ADMIN — KETOROLAC TROMETHAMINE 15 MG: 30 INJECTION, SOLUTION INTRAMUSCULAR at 04:01

## 2025-05-22 RX ADMIN — HEPARIN SODIUM 5000 UNITS: 5000 INJECTION, SOLUTION INTRAVENOUS; SUBCUTANEOUS at 09:00

## 2025-05-22 RX ADMIN — ACETAMINOPHEN 1000 MG: 10 INJECTION INTRAVENOUS at 13:45

## 2025-05-22 RX ADMIN — ACETAMINOPHEN 1000 MG: 10 INJECTION INTRAVENOUS at 01:00

## 2025-05-22 RX ADMIN — KETOROLAC TROMETHAMINE 15 MG: 30 INJECTION, SOLUTION INTRAMUSCULAR at 15:51

## 2025-05-22 RX ADMIN — PANTOPRAZOLE SODIUM 40 MG: 40 INJECTION, POWDER, FOR SOLUTION INTRAVENOUS at 06:21

## 2025-05-22 RX ADMIN — ACETAMINOPHEN 1000 MG: 10 INJECTION INTRAVENOUS at 08:59

## 2025-05-22 RX ADMIN — HEPARIN SODIUM 5000 UNITS: 5000 INJECTION, SOLUTION INTRAVENOUS; SUBCUTANEOUS at 16:42

## 2025-05-22 RX ADMIN — GABAPENTIN 300 MG: 300 CAPSULE ORAL at 15:51

## 2025-05-22 RX ADMIN — DIATRIZOATE MEGLUMINE AND DIATRIZOATE SODIUM 15 ML: 600; 100 SOLUTION ORAL; RECTAL at 08:49

## 2025-05-22 RX ADMIN — GABAPENTIN 300 MG: 300 CAPSULE ORAL at 09:01

## 2025-05-22 RX ADMIN — POLYETHYLENE GLYCOL 3350 17 G: 17 POWDER, FOR SOLUTION ORAL at 09:00

## 2025-05-22 RX ADMIN — SULFASALAZINE 500 MG: 500 TABLET ORAL at 09:01

## 2025-05-22 RX ADMIN — OXYCODONE HYDROCHLORIDE 5 MG: 5 SOLUTION ORAL at 04:59

## 2025-05-22 RX ADMIN — AMLODIPINE BESYLATE 5 MG: 10 TABLET ORAL at 09:01

## 2025-05-22 RX ADMIN — ACETAMINOPHEN 1000 MG: 10 INJECTION INTRAVENOUS at 06:00

## 2025-05-22 RX ADMIN — KETOROLAC TROMETHAMINE 15 MG: 30 INJECTION, SOLUTION INTRAMUSCULAR at 09:00

## 2025-05-22 RX ADMIN — SULFASALAZINE 500 MG: 500 TABLET ORAL at 13:45

## 2025-05-22 RX ADMIN — KETOROLAC TROMETHAMINE 15 MG: 30 INJECTION, SOLUTION INTRAMUSCULAR at 21:39

## 2025-05-22 RX ADMIN — HEPARIN SODIUM 5000 UNITS: 5000 INJECTION, SOLUTION INTRAVENOUS; SUBCUTANEOUS at 01:00

## 2025-05-22 ASSESSMENT — PAIN DESCRIPTION - LOCATION: LOCATION: ABDOMEN

## 2025-05-22 ASSESSMENT — PAIN SCALES - GENERAL
PAINLEVEL_OUTOF10: 6
PAINLEVEL_OUTOF10: 2
PAINLEVEL_OUTOF10: 2
PAINLEVEL_OUTOF10: 3

## 2025-05-22 ASSESSMENT — PAIN DESCRIPTION - ORIENTATION: ORIENTATION: UPPER

## 2025-05-22 ASSESSMENT — PAIN DESCRIPTION - DESCRIPTORS: DESCRIPTORS: DISCOMFORT

## 2025-05-22 ASSESSMENT — PAIN - FUNCTIONAL ASSESSMENT: PAIN_FUNCTIONAL_ASSESSMENT: 0-10

## 2025-05-22 ASSESSMENT — ACTIVITIES OF DAILY LIVING (ADL): LACK_OF_TRANSPORTATION: NO

## 2025-05-22 NOTE — CARE PLAN
Problem: Pain  Goal: Takes deep breaths with improved pain control throughout the shift  5/22/2025 0957 by Tresa Hansen RN  Outcome: Not Progressing  5/22/2025 0952 by Tresa Hansen RN  Outcome: Progressing  Goal: Turns in bed with improved pain control throughout the shift  5/22/2025 0957 by Tresa Hansen RN  Outcome: Not Progressing  5/22/2025 0952 by Tresa Hansen RN  Outcome: Progressing  Goal: Walks with improved pain control throughout the shift  5/22/2025 0957 by Tresa Hansen RN  Outcome: Not Progressing  5/22/2025 0952 by Tresa Hansen RN  Outcome: Progressing  Goal: Performs ADL's with improved pain control throughout shift  5/22/2025 0957 by Tresa Hansen RN  Outcome: Not Progressing  5/22/2025 0952 by Tresa Hansen RN  Outcome: Progressing  Goal: Participates in PT with improved pain control throughout the shift  5/22/2025 0957 by Tresa Hansen RN  Outcome: Not Progressing  5/22/2025 0952 by Tresa Hansen RN  Outcome: Progressing  Goal: Free from opioid side effects throughout the shift  5/22/2025 0957 by Tresa Hansen RN  Outcome: Not Progressing  5/22/2025 0952 by Tresa Hansen RN  Outcome: Progressing   .    Problem: Pain  Goal: Takes deep breaths with improved pain control throughout the shift  5/22/2025 0957 by Tresa Hansen RN  Outcome: Not Progressing  5/22/2025 0952 by Tresa Hansen RN  Outcome: Progressing  Goal: Turns in bed with improved pain control throughout the shift  5/22/2025 0957 by Tresa Hansen RN  Outcome: Not Progressing  5/22/2025 0952 by Tresa Hansen RN  Outcome: Progressing  Goal: Walks with improved pain control throughout the shift  5/22/2025 0957 by Tresa Hansen RN  Outcome: Not Progressing  5/22/2025 0952 by Tresa Hansen RN  Outcome: Progressing  Goal: Performs ADL's with improved pain control throughout shift  5/22/2025 0957 by Tresa Hansen RN  Outcome: Not Progressing  5/22/2025 0952 by Tresa Hansen, RN  Outcome: Progressing  Goal: Participates in  PT with improved pain control throughout the shift  5/22/2025 0957 by Tresa Hansen RN  Outcome: Not Progressing  5/22/2025 0952 by Tresa Hansen RN  Outcome: Progressing  Goal: Free from opioid side effects throughout the shift  5/22/2025 0957 by Tresa Hansen RN  Outcome: Not Progressing  5/22/2025 0952 by Tresa Hansen RN  Outcome: Progressing

## 2025-05-22 NOTE — NURSING NOTE
Patient not currently tolerating full liquids. When patient attempted to drink protein shake, the fluid immediately came back up. Patient not complaining of pain or nausea at this time. Nursing will continue to monitor.

## 2025-05-22 NOTE — PROGRESS NOTES
"Beatriz Raymond \"Slava" is a 79 y.o. female on day 1 of admission presenting with Hiatal hernia.    Subjective   Doing well post op. Some dysphagia with pills yesterday, but improving this AM. Tolerating clears without issue. Pain controlled.       Objective     Physical Exam  Physical Exam:   Constitutional: Well developed, awake/alert/oriented x3, no distress, alert and cooperative  Eyes: PERRL, EOMI, clear sclera  Head/Neck: Neck supple, no apparent injury, No JVD, trachea midline  Respiratory/Thorax: good chest expansion, thorax symmetric  Cardiovascular: Regular, rate and rhythm,  2+ equal pulses of the extremities  Gastrointestinal: Nondistended, soft, minimally tender, no rebound tenderness or guarding, no masses palpable, incisions c/d/i  Musculoskeletal: ROM intact, no joint swelling, normal strength  Extremities: normal extremities, no cyanosis edema, contusions or wounds, no clubbing  Neurological: alert and oriented x3, intact senses,  Psychological: Appropriate mood and behavior  Skin: Warm and dry, no lesions, no rashes    Last Recorded Vitals  Blood pressure 157/67, pulse 65, temperature 35.8 °C (96.4 °F), temperature source Temporal, resp. rate 18, height 1.626 m (5' 4.02\"), weight 69 kg (152 lb 1.9 oz), SpO2 95%.  Intake/Output last 3 Shifts:  I/O last 3 completed shifts:  In: 3493.3 (50.6 mL/kg) [P.O.:120; I.V.:2023.3 (29.3 mL/kg); IV Piggyback:1350]  Out: 600 (8.7 mL/kg) [Urine:600 (0.2 mL/kg/hr)]  Weight: 69 kg     Relevant Results                              Assessment & Plan  Hiatal hernia    79F s/p laparoscopic HHR with mesh and Nissen fundoplication yesterday, doing well.  - Upper GI contrast study today to assess for leak or obstruction  - Advance diet to include full liquids if esophagram does not show leak or obstruction  - Zofran every 6 hours for nausea  - Protonix 40mg IV daily  - Tylenol and oxycodone for pain. Dilaudid as needed for breakthrough pain  - IV fluid support with lactated " ringers  - Encourage IS/Ambulation  - Begin discharge planning        I spent 60 minutes in the professional and overall care of this patient.      Eliud Hutton MD

## 2025-05-22 NOTE — DISCHARGE INSTRUCTIONS
Medications:  - Medications should be crushed and mixed with full liquids. Capsules may be opened and mixed with full liquids.  - Take 650mg Tylenol (liquid preferred) every 6 hours for pain. Take between doses of your opioid pain medication. Try to limit the use of your opioid pain medication and only take as needed.  - If you have medications that you still cannot tolerate by your first visit with your surgeon or dietitian, please discuss this.   - You should be receiving or already have received the following medications for your postoperative course:  antinausea medication (ondansetron, metoclopramide), and pain medication (oxycodone, morphine, hydromorphone, hydrocodone). If you are missing any of these, please call the office immediately so we can prescribe them for you.    Constipation  - Take fiber (benefiber or metamucil) twice daily. Please also take colace (docusate) twice a day while taking oxycodone or other opiates. If you remain constipated (no bowel movement for 2 days) despite these medications, please take milk of magnesia and call the office to notify us.    Activity instructions:   - No lifting, pulling, or pushing objects greater than 15 pounds for 6weeks to 3 months for first time operation, if this redo case or revision then you have to wait 3-6 mouths with weight restriction   - Activity otherwise as tolerated.   - You may shower. Soap and water may run over your incisions. Pat dry. No submerging in baths or  swimming (activities that keep your incisions underwater) for at least two weeks.    - You may not drive while taking narcotics.     Diet:   - You will go home on a full liquid diet (similar to the diet you were on your final day in the hospital). Acceptable full liquids include protein shakes, sugar free jello, sugar free pudding, and creamy soups (no chunks).  You will stay on this full liquid diet on days 1-5 post discharge.    - You will then go to pureed foods (things you smear with  a butter knife) on days 6-10 post discharge.   - You will then go to soft diet (things you can mash with a fork) on days 11-15 post discharge.   - For further information, follow the diet instructions listed in your surgery instruction packet.     Call Provider If:  - Breathing faster or harder than normal.   - Fever of 100.4 F (38 C) or higher or feeling of chills.   - Feeling very sleepy and difficult to awaken.   - Inability to drink or significant decrease in ability to drink since discharge.   - Vomiting (throwing up) and not able to eat or drink for 12 hours.   - Urinating much less than your normal.  - More than 4 loose, watery bowel movements in 24 hours (diarrhea).   - Any new concerning symptoms.

## 2025-05-22 NOTE — CARE PLAN
Problem: Pain  Goal: Takes deep breaths with improved pain control throughout the shift  5/22/2025 0958 by Tresa Hansen RN  Outcome: Progressing  5/22/2025 0957 by Tresa Hansen RN  Outcome: Not Progressing  5/22/2025 0952 by Tresa Hansen RN  Outcome: Progressing  Goal: Turns in bed with improved pain control throughout the shift  5/22/2025 0958 by Tresa Hansen RN  Outcome: Progressing  5/22/2025 0957 by Tresa Hansen RN  Outcome: Not Progressing  5/22/2025 0952 by Tresa Hansen RN  Outcome: Progressing  Goal: Walks with improved pain control throughout the shift  5/22/2025 0958 by Tresa Hansen RN  Outcome: Progressing  5/22/2025 0957 by Tresa Hansen RN  Outcome: Not Progressing  5/22/2025 0952 by Tresa Hansen RN  Outcome: Progressing  Goal: Performs ADL's with improved pain control throughout shift  5/22/2025 0958 by Tresa Hansen RN  Outcome: Progressing  5/22/2025 0957 by Tresa Hansen RN  Outcome: Not Progressing  5/22/2025 0952 by Tresa Hansen RN  Outcome: Progressing  Goal: Participates in PT with improved pain control throughout the shift  5/22/2025 0958 by Tresa Hansen RN  Outcome: Progressing  5/22/2025 0957 by Tresa Hansen RN  Outcome: Not Progressing  5/22/2025 0952 by Tresa Hansen RN  Outcome: Progressing  Goal: Free from opioid side effects throughout the shift  5/22/2025 0958 by Tresa Hansen RN  Outcome: Progressing  5/22/2025 0957 by Tresa Hansen RN  Outcome: Not Progressing  5/22/2025 0952 by Tresa Hansen RN  Outcome: Progressing  Goal: Free from acute confusion related to pain meds throughout the shift  5/22/2025 0958 by Tresa Hansen RN  Outcome: Progressing  5/22/2025 0957 by Tresa Hansen RN  Outcome: Progressing  5/22/2025 0952 by Tresa Hansen RN  Outcome: Progressing     Problem: Skin  Goal: Decreased wound size/increased tissue granulation at next dressing change  5/22/2025 0958 by Tresa Hansen RN  Outcome: Progressing  5/22/2025 0957 by Tresa Hansen,  RN  Outcome: Progressing  5/22/2025 0952 by Tresa Hansen RN  Outcome: Progressing  Flowsheets (Taken 5/21/2025 1305)  Decreased wound size/increased tissue granulation at next dressing change: Promote sleep for wound healing  Goal: Participates in plan/prevention/treatment measures  5/22/2025 0958 by Tresa Hansen RN  Outcome: Progressing  5/22/2025 0957 by Tresa Hansen RN  Outcome: Progressing  5/22/2025 0952 by Tresa Hansen RN  Outcome: Progressing  Flowsheets (Taken 5/21/2025 1305)  Participates in plan/prevention/treatment measures: Increase activity/out of bed for meals  Goal: Prevent/manage excess moisture  5/22/2025 0958 by Tresa Hansen RN  Outcome: Progressing  5/22/2025 0957 by Tresa Hansen RN  Outcome: Progressing  5/22/2025 0952 by Tresa Hansen RN  Outcome: Progressing  Flowsheets (Taken 5/21/2025 1305)  Prevent/manage excess moisture: Moisturize dry skin  Goal: Prevent/minimize sheer/friction injuries  5/22/2025 0958 by Tresa Hansen RN  Outcome: Progressing  5/22/2025 0957 by Tresa Hansen RN  Outcome: Progressing  5/22/2025 0952 by Tresa Hansen RN  Outcome: Progressing  Flowsheets (Taken 5/21/2025 1305)  Prevent/minimize sheer/friction injuries: Increase activity/out of bed for meals  Goal: Promote/optimize nutrition  5/22/2025 0958 by Tresa Hansen RN  Outcome: Progressing  5/22/2025 0957 by Tresa Hansen RN  Outcome: Progressing  5/22/2025 0952 by Tresa Hansen RN  Outcome: Progressing  Flowsheets (Taken 5/21/2025 1305)  Promote/optimize nutrition: Monitor/record intake including meals  Goal: Promote skin healing  5/22/2025 0958 by Tresa Hansen RN  Outcome: Progressing  5/22/2025 0957 by Tresa Hansen RN  Outcome: Progressing  5/22/2025 0952 by Tresa Hansen RN  Outcome: Progressing  Flowsheets (Taken 5/21/2025 1305)  Promote skin healing: Turn/reposition every 2 hours/use positioning/transfer devices    Problem: Pain  Goal: Takes deep breaths with improved pain control  throughout the shift  5/22/2025 0958 by Tresa Hansen RN  Outcome: Progressing  5/22/2025 0957 by Tresa Hansen RN  Outcome: Not Progressing  5/22/2025 0952 by Tresa Hansen RN  Outcome: Progressing  Goal: Turns in bed with improved pain control throughout the shift  5/22/2025 0958 by Tresa Hansen RN  Outcome: Progressing  5/22/2025 0957 by Tresa Hansen RN  Outcome: Not Progressing  5/22/2025 0952 by Tresa Hansen RN  Outcome: Progressing  Goal: Walks with improved pain control throughout the shift  5/22/2025 0958 by Tresa Hansen RN  Outcome: Progressing  5/22/2025 0957 by Tresa Hansen RN  Outcome: Not Progressing  5/22/2025 0952 by Tresa Hansen RN  Outcome: Progressing  Goal: Performs ADL's with improved pain control throughout shift  5/22/2025 0958 by Tresa Hansen RN  Outcome: Progressing  5/22/2025 0957 by Tresa Hansen RN  Outcome: Not Progressing  5/22/2025 0952 by Tresa Hansen RN  Outcome: Progressing  Goal: Participates in PT with improved pain control throughout the shift  5/22/2025 0958 by Tresa Hansen RN  Outcome: Progressing  5/22/2025 0957 by Tresa Hansen RN  Outcome: Not Progressing  5/22/2025 0952 by Tresa Hansen RN  Outcome: Progressing  Goal: Free from opioid side effects throughout the shift  5/22/2025 0958 by Tresa Hansen RN  Outcome: Progressing  5/22/2025 0957 by Tresa Hansen RN  Outcome: Not Progressing  5/22/2025 0952 by Tresa Hansen RN  Outcome: Progressing

## 2025-05-22 NOTE — PROGRESS NOTES
05/22/25 0917   Discharge Planning   Living Arrangements Spouse/significant other   Support Systems Spouse/significant other   Assistance Needed A&0x4, independent with ADLs, no DME ( has walker to use if needed) drives, room air, no cpap or bipap, not active with any HHC, PCP is Venkat Gill   Type of Residence Private residence   Number of Stairs to Enter Residence 3   Number of Stairs Within Residence 12  (bedroom on 1st floor)   Do you have animals or pets at home? No   Who is requesting discharge planning? Provider   Home or Post Acute Services None   Expected Discharge Disposition Home  (denies any HHC needs, on 2L here, none baseline, spouse will transport home.)   Does the patient need discharge transport arranged? No   Financial Resource Strain   How hard is it for you to pay for the very basics like food, housing, medical care, and heating? Not hard   Housing Stability   In the last 12 months, was there a time when you were not able to pay the mortgage or rent on time? N   In the past 12 months, how many times have you moved where you were living? 0   At any time in the past 12 months, were you homeless or living in a shelter (including now)? N   Transportation Needs   In the past 12 months, has lack of transportation kept you from medical appointments or from getting medications? no   In the past 12 months, has lack of transportation kept you from meetings, work, or from getting things needed for daily living? No   Stroke Family Assessment   Stroke Family Assessment Needed No   Intensity of Service   Intensity of Service 0-30 min     5/22/24 @ 0922: patient POD 1 for hiatal hernia repair

## 2025-05-22 NOTE — CARE PLAN
Problem: Pain  Goal: Takes deep breaths with improved pain control throughout the shift  Outcome: Progressing  Goal: Turns in bed with improved pain control throughout the shift  Outcome: Progressing  Goal: Walks with improved pain control throughout the shift  Outcome: Progressing  Goal: Performs ADL's with improved pain control throughout shift  Outcome: Progressing  Goal: Participates in PT with improved pain control throughout the shift  Outcome: Progressing  Goal: Free from opioid side effects throughout the shift  Outcome: Progressing  Goal: Free from acute confusion related to pain meds throughout the shift  Outcome: Progressing     Problem: Skin  Goal: Decreased wound size/increased tissue granulation at next dressing change  Outcome: Progressing  Flowsheets (Taken 5/21/2025 1305)  Decreased wound size/increased tissue granulation at next dressing change: Promote sleep for wound healing  Goal: Participates in plan/prevention/treatment measures  Outcome: Progressing  Flowsheets (Taken 5/21/2025 1305)  Participates in plan/prevention/treatment measures: Increase activity/out of bed for meals  Goal: Prevent/manage excess moisture  Outcome: Progressing  Flowsheets (Taken 5/21/2025 1305)  Prevent/manage excess moisture: Moisturize dry skin  Goal: Prevent/minimize sheer/friction injuries  Outcome: Progressing  Flowsheets (Taken 5/21/2025 1305)  Prevent/minimize sheer/friction injuries: Increase activity/out of bed for meals  Goal: Promote/optimize nutrition  Outcome: Progressing  Flowsheets (Taken 5/21/2025 1305)  Promote/optimize nutrition: Monitor/record intake including meals  Goal: Promote skin healing  Outcome: Progressing  Flowsheets (Taken 5/21/2025 1305)  Promote skin healing: Turn/reposition every 2 hours/use positioning/transfer devices

## 2025-05-22 NOTE — CARE PLAN
The patient's goals for the shift include Pain control    The clinical goals for the shift include Pain control      Problem: Pain  Goal: Takes deep breaths with improved pain control throughout the shift  Outcome: Progressing     Problem: Pain  Goal: Turns in bed with improved pain control throughout the shift  Outcome: Progressing     Problem: Pain  Goal: Walks with improved pain control throughout the shift  Outcome: Progressing     Problem: Pain  Goal: Free from opioid side effects throughout the shift  Outcome: Progressing     Problem: Skin  Goal: Prevent/manage excess moisture  Outcome: Progressing     Problem: Skin  Goal: Prevent/minimize sheer/friction injuries  Outcome: Progressing

## 2025-05-23 ENCOUNTER — ANESTHESIA EVENT (OUTPATIENT)
Dept: OPERATING ROOM | Facility: HOSPITAL | Age: 80
End: 2025-05-23
Payer: MEDICARE

## 2025-05-23 ENCOUNTER — ANESTHESIA (OUTPATIENT)
Dept: OPERATING ROOM | Facility: HOSPITAL | Age: 80
End: 2025-05-23
Payer: MEDICARE

## 2025-05-23 ENCOUNTER — SURGERY (OUTPATIENT)
Age: 80
End: 2025-05-23
Payer: MEDICARE

## 2025-05-23 ENCOUNTER — APPOINTMENT (OUTPATIENT)
Dept: CARDIOLOGY | Facility: HOSPITAL | Age: 80
End: 2025-05-23
Payer: MEDICARE

## 2025-05-23 ENCOUNTER — APPOINTMENT (OUTPATIENT)
Dept: RADIOLOGY | Facility: HOSPITAL | Age: 80
End: 2025-05-23
Payer: MEDICARE

## 2025-05-23 PROBLEM — K76.0 FATTY (CHANGE OF) LIVER, NOT ELSEWHERE CLASSIFIED: Status: ACTIVE | Noted: 2025-05-23

## 2025-05-23 PROBLEM — D50.0 IRON DEFICIENCY ANEMIA SECONDARY TO BLOOD LOSS (CHRONIC): Status: ACTIVE | Noted: 2025-05-23

## 2025-05-23 PROBLEM — K21.00 GASTRO-ESOPHAGEAL REFLUX DISEASE WITH ESOPHAGITIS: Status: ACTIVE | Noted: 2025-05-23

## 2025-05-23 PROBLEM — R06.83 SNORING: Status: ACTIVE | Noted: 2024-01-09

## 2025-05-23 PROBLEM — K58.9 IBS (IRRITABLE BOWEL SYNDROME): Status: ACTIVE | Noted: 2025-05-23

## 2025-05-23 PROBLEM — D84.9 IMMUNODEFICIENCY, UNSPECIFIED: Status: ACTIVE | Noted: 2025-05-23

## 2025-05-23 PROBLEM — D35.01 BENIGN NEOPLASM OF RIGHT ADRENAL GLAND: Status: ACTIVE | Noted: 2023-08-31

## 2025-05-23 PROBLEM — C40.20: Status: ACTIVE | Noted: 2025-05-23

## 2025-05-23 PROBLEM — E04.2 NON-TOXIC MULTINODULAR GOITER: Status: ACTIVE | Noted: 2023-08-31

## 2025-05-23 PROBLEM — R19.8 PERFORATED VISCUS: Status: ACTIVE | Noted: 2025-04-30

## 2025-05-23 LAB
ALBUMIN SERPL BCP-MCNC: 3.4 G/DL (ref 3.4–5)
ALBUMIN SERPL BCP-MCNC: 3.8 G/DL (ref 3.4–5)
ALP SERPL-CCNC: 71 U/L (ref 33–136)
ALP SERPL-CCNC: 91 U/L (ref 33–136)
ALT SERPL W P-5'-P-CCNC: 23 U/L (ref 7–45)
ALT SERPL W P-5'-P-CCNC: 29 U/L (ref 7–45)
AMORPH CRY #/AREA UR COMP ASSIST: ABNORMAL /HPF
ANION GAP BLDA CALCULATED.4IONS-SCNC: 10 MMO/L (ref 10–25)
ANION GAP SERPL CALC-SCNC: 12 MMOL/L (ref 10–20)
ANION GAP SERPL CALC-SCNC: 16 MMOL/L (ref 10–20)
APPEARANCE UR: ABNORMAL
ARTERIAL PATENCY WRIST A: POSITIVE
AST SERPL W P-5'-P-CCNC: 15 U/L (ref 9–39)
AST SERPL W P-5'-P-CCNC: 24 U/L (ref 9–39)
BASE EXCESS BLDA CALC-SCNC: -2.6 MMOL/L (ref -2–3)
BILIRUB SERPL-MCNC: 0.5 MG/DL (ref 0–1.2)
BILIRUB SERPL-MCNC: 0.7 MG/DL (ref 0–1.2)
BILIRUB UR STRIP.AUTO-MCNC: NEGATIVE MG/DL
BODY TEMPERATURE: 37 DEGREES CELSIUS
BUN SERPL-MCNC: 24 MG/DL (ref 6–23)
BUN SERPL-MCNC: 25 MG/DL (ref 6–23)
CA-I BLDA-SCNC: 1.12 MMOL/L (ref 1.1–1.33)
CALCIUM SERPL-MCNC: 8.6 MG/DL (ref 8.6–10.3)
CALCIUM SERPL-MCNC: 9 MG/DL (ref 8.6–10.3)
CHLORIDE BLDA-SCNC: 98 MMOL/L (ref 98–107)
CHLORIDE SERPL-SCNC: 102 MMOL/L (ref 98–107)
CHLORIDE SERPL-SCNC: 105 MMOL/L (ref 98–107)
CO2 SERPL-SCNC: 21 MMOL/L (ref 21–32)
CO2 SERPL-SCNC: 24 MMOL/L (ref 21–32)
COLOR UR: YELLOW
CREAT SERPL-MCNC: 1.35 MG/DL (ref 0.5–1.05)
CREAT SERPL-MCNC: 1.43 MG/DL (ref 0.5–1.05)
EGFRCR SERPLBLD CKD-EPI 2021: 38 ML/MIN/1.73M*2
EGFRCR SERPLBLD CKD-EPI 2021: 40 ML/MIN/1.73M*2
ERYTHROCYTE [DISTWIDTH] IN BLOOD BY AUTOMATED COUNT: 13.4 % (ref 11.5–14.5)
GLUCOSE BLDA-MCNC: 131 MG/DL (ref 74–99)
GLUCOSE SERPL-MCNC: 102 MG/DL (ref 74–99)
GLUCOSE SERPL-MCNC: 108 MG/DL (ref 74–99)
GLUCOSE UR STRIP.AUTO-MCNC: NORMAL MG/DL
HCO3 BLDA-SCNC: 22.6 MMOL/L (ref 22–26)
HCT VFR BLD AUTO: 31.2 % (ref 36–46)
HCT VFR BLD EST: 30 % (ref 36–46)
HGB BLD-MCNC: 9 G/DL (ref 12–16)
HGB BLDA-MCNC: 10 G/DL (ref 12–16)
HOLD SPECIMEN: NORMAL
INHALED O2 CONCENTRATION: 21 %
KETONES UR STRIP.AUTO-MCNC: NEGATIVE MG/DL
LACTATE BLDA-SCNC: 1.3 MMOL/L (ref 0.4–2)
LEUKOCYTE ESTERASE UR QL STRIP.AUTO: ABNORMAL
MCH RBC QN AUTO: 30.2 PG (ref 26–34)
MCHC RBC AUTO-ENTMCNC: 28.8 G/DL (ref 32–36)
MCV RBC AUTO: 105 FL (ref 80–100)
NITRITE UR QL STRIP.AUTO: NEGATIVE
NRBC BLD-RTO: 0 /100 WBCS (ref 0–0)
OXYHGB MFR BLDA: 88.2 % (ref 94–98)
PCO2 BLDA: 40 MM HG (ref 38–42)
PH BLDA: 7.36 PH (ref 7.38–7.42)
PH UR STRIP.AUTO: 5 [PH]
PLATELET # BLD AUTO: 219 X10*3/UL (ref 150–450)
PO2 BLDA: 58 MM HG (ref 85–95)
POTASSIUM BLDA-SCNC: 4.7 MMOL/L (ref 3.5–5.3)
POTASSIUM SERPL-SCNC: 4.2 MMOL/L (ref 3.5–5.3)
POTASSIUM SERPL-SCNC: 4.5 MMOL/L (ref 3.5–5.3)
PROT SERPL-MCNC: 5.7 G/DL (ref 6.4–8.2)
PROT SERPL-MCNC: 6.5 G/DL (ref 6.4–8.2)
PROT UR STRIP.AUTO-MCNC: ABNORMAL MG/DL
RBC # BLD AUTO: 2.98 X10*6/UL (ref 4–5.2)
RBC # UR STRIP.AUTO: NEGATIVE MG/DL
RBC #/AREA URNS AUTO: ABNORMAL /HPF
SAO2 % BLDA: 92 % (ref 94–100)
SODIUM BLDA-SCNC: 126 MMOL/L (ref 136–145)
SODIUM SERPL-SCNC: 134 MMOL/L (ref 136–145)
SODIUM SERPL-SCNC: 137 MMOL/L (ref 136–145)
SP GR UR STRIP.AUTO: 1.02
SPECIMEN DRAWN FROM PATIENT: ABNORMAL
SQUAMOUS #/AREA URNS AUTO: ABNORMAL /HPF
TRANS CELLS #/AREA UR COMP ASSIST: ABNORMAL /HPF
UROBILINOGEN UR STRIP.AUTO-MCNC: NORMAL MG/DL
WBC # BLD AUTO: 10.3 X10*3/UL (ref 4.4–11.3)
WBC #/AREA URNS AUTO: ABNORMAL /HPF

## 2025-05-23 PROCEDURE — 36415 COLL VENOUS BLD VENIPUNCTURE: CPT | Performed by: SURGERY

## 2025-05-23 PROCEDURE — 3600000008 HC OR TIME - EACH INCREMENTAL 1 MINUTE - PROCEDURE LEVEL THREE: Performed by: SURGERY

## 2025-05-23 PROCEDURE — 0DJ08ZZ INSPECTION OF UPPER INTESTINAL TRACT, VIA NATURAL OR ARTIFICIAL OPENING ENDOSCOPIC: ICD-10-PCS | Performed by: SURGERY

## 2025-05-23 PROCEDURE — 7100000002 HC RECOVERY ROOM TIME - EACH INCREMENTAL 1 MINUTE: Performed by: SURGERY

## 2025-05-23 PROCEDURE — 80053 COMPREHEN METABOLIC PANEL: CPT | Performed by: SURGERY

## 2025-05-23 PROCEDURE — 81001 URINALYSIS AUTO W/SCOPE: CPT | Performed by: SURGERY

## 2025-05-23 PROCEDURE — 85027 COMPLETE CBC AUTOMATED: CPT | Performed by: SURGERY

## 2025-05-23 PROCEDURE — 93010 ELECTROCARDIOGRAM REPORT: CPT | Performed by: INTERNAL MEDICINE

## 2025-05-23 PROCEDURE — 84075 ASSAY ALKALINE PHOSPHATASE: CPT | Performed by: SURGERY

## 2025-05-23 PROCEDURE — 2500000004 HC RX 250 GENERAL PHARMACY W/ HCPCS (ALT 636 FOR OP/ED): Mod: JZ | Performed by: SURGERY

## 2025-05-23 PROCEDURE — 71275 CT ANGIOGRAPHY CHEST: CPT | Performed by: RADIOLOGY

## 2025-05-23 PROCEDURE — 93005 ELECTROCARDIOGRAM TRACING: CPT

## 2025-05-23 PROCEDURE — 36600 WITHDRAWAL OF ARTERIAL BLOOD: CPT

## 2025-05-23 PROCEDURE — 2550000001 HC RX 255 CONTRASTS: Performed by: SURGERY

## 2025-05-23 PROCEDURE — 87086 URINE CULTURE/COLONY COUNT: CPT | Mod: PARLAB | Performed by: SURGERY

## 2025-05-23 PROCEDURE — 2500000002 HC RX 250 W HCPCS SELF ADMINISTERED DRUGS (ALT 637 FOR MEDICARE OP, ALT 636 FOR OP/ED): Performed by: SURGERY

## 2025-05-23 PROCEDURE — 2720000007 HC OR 272 NO HCPCS: Performed by: SURGERY

## 2025-05-23 PROCEDURE — 7100000011 HC EXTENDED STAY RECOVERY HOURLY - NURSING UNIT

## 2025-05-23 PROCEDURE — 7100000001 HC RECOVERY ROOM TIME - INITIAL BASE CHARGE: Performed by: SURGERY

## 2025-05-23 PROCEDURE — 0BUT4JZ SUPPLEMENT DIAPHRAGM WITH SYNTHETIC SUBSTITUTE, PERCUTANEOUS ENDOSCOPIC APPROACH: ICD-10-PCS | Performed by: SURGERY

## 2025-05-23 PROCEDURE — 0DB60ZZ EXCISION OF STOMACH, OPEN APPROACH: ICD-10-PCS | Performed by: SURGERY

## 2025-05-23 PROCEDURE — 70450 CT HEAD/BRAIN W/O DYE: CPT

## 2025-05-23 PROCEDURE — 71275 CT ANGIOGRAPHY CHEST: CPT

## 2025-05-23 PROCEDURE — 3700000001 HC GENERAL ANESTHESIA TIME - INITIAL BASE CHARGE: Performed by: SURGERY

## 2025-05-23 PROCEDURE — 99024 POSTOP FOLLOW-UP VISIT: CPT | Performed by: SURGERY

## 2025-05-23 PROCEDURE — A9698 NON-RAD CONTRAST MATERIALNOC: HCPCS | Performed by: SURGERY

## 2025-05-23 PROCEDURE — 3700000002 HC GENERAL ANESTHESIA TIME - EACH INCREMENTAL 1 MINUTE: Performed by: SURGERY

## 2025-05-23 PROCEDURE — 84132 ASSAY OF SERUM POTASSIUM: CPT | Performed by: SURGERY

## 2025-05-23 PROCEDURE — 2500000001 HC RX 250 WO HCPCS SELF ADMINISTERED DRUGS (ALT 637 FOR MEDICARE OP): Performed by: SURGERY

## 2025-05-23 PROCEDURE — 96372 THER/PROPH/DIAG INJ SC/IM: CPT | Performed by: SURGERY

## 2025-05-23 PROCEDURE — 3600000003 HC OR TIME - INITIAL BASE CHARGE - PROCEDURE LEVEL THREE: Performed by: SURGERY

## 2025-05-23 PROCEDURE — 70450 CT HEAD/BRAIN W/O DYE: CPT | Performed by: RADIOLOGY

## 2025-05-23 RX ORDER — OXYBUTYNIN CHLORIDE 5 MG/1
5 TABLET ORAL 3 TIMES DAILY
Status: DISCONTINUED | OUTPATIENT
Start: 2025-05-23 | End: 2025-05-27

## 2025-05-23 RX ORDER — CIPROFLOXACIN 2 MG/ML
400 INJECTION, SOLUTION INTRAVENOUS EVERY 12 HOURS
Status: DISCONTINUED | OUTPATIENT
Start: 2025-05-23 | End: 2025-05-23

## 2025-05-23 RX ORDER — FLUCONAZOLE 2 MG/ML
400 INJECTION, SOLUTION INTRAVENOUS EVERY 24 HOURS
Status: DISCONTINUED | OUTPATIENT
Start: 2025-05-23 | End: 2025-05-24

## 2025-05-23 RX ORDER — SODIUM CHLORIDE, SODIUM LACTATE, POTASSIUM CHLORIDE, CALCIUM CHLORIDE 600; 310; 30; 20 MG/100ML; MG/100ML; MG/100ML; MG/100ML
50 INJECTION, SOLUTION INTRAVENOUS CONTINUOUS
Status: ACTIVE | OUTPATIENT
Start: 2025-05-23 | End: 2025-05-24

## 2025-05-23 RX ORDER — ACETAMINOPHEN 10 MG/ML
1000 INJECTION, SOLUTION INTRAVENOUS EVERY 6 HOURS
Status: DISPENSED | OUTPATIENT
Start: 2025-05-23 | End: 2025-05-24

## 2025-05-23 RX ORDER — ADHESIVE BANDAGE
30 BANDAGE TOPICAL ONCE
Status: COMPLETED | OUTPATIENT
Start: 2025-05-23 | End: 2025-05-23

## 2025-05-23 RX ORDER — DIPHENHYDRAMINE HYDROCHLORIDE 50 MG/ML
12.5 INJECTION, SOLUTION INTRAMUSCULAR; INTRAVENOUS ONCE
Status: COMPLETED | OUTPATIENT
Start: 2025-05-23 | End: 2025-05-23

## 2025-05-23 RX ORDER — BISACODYL 10 MG/1
10 SUPPOSITORY RECTAL DAILY
Status: DISCONTINUED | OUTPATIENT
Start: 2025-05-23 | End: 2025-05-25

## 2025-05-23 RX ORDER — LIDOCAINE 560 MG/1
1 PATCH PERCUTANEOUS; TOPICAL; TRANSDERMAL DAILY
Status: DISCONTINUED | OUTPATIENT
Start: 2025-05-23 | End: 2025-06-06 | Stop reason: HOSPADM

## 2025-05-23 RX ADMIN — SODIUM CHLORIDE, SODIUM LACTATE, POTASSIUM CHLORIDE, AND CALCIUM CHLORIDE 250 ML: .6; .31; .03; .02 INJECTION, SOLUTION INTRAVENOUS at 09:42

## 2025-05-23 RX ADMIN — ACETAMINOPHEN 1000 MG: 10 INJECTION INTRAVENOUS at 01:29

## 2025-05-23 RX ADMIN — ACETAMINOPHEN 1000 MG: 10 INJECTION INTRAVENOUS at 07:50

## 2025-05-23 RX ADMIN — MAGNESIUM HYDROXIDE 30 ML: 400 SUSPENSION ORAL at 13:57

## 2025-05-23 RX ADMIN — ACETAMINOPHEN 1000 MG: 10 INJECTION INTRAVENOUS at 22:34

## 2025-05-23 RX ADMIN — BISACODYL 10 MG: 10 SUPPOSITORY RECTAL at 14:15

## 2025-05-23 RX ADMIN — DIPHENHYDRAMINE HYDROCHLORIDE 12.5 MG: 50 INJECTION, SOLUTION INTRAMUSCULAR; INTRAVENOUS at 20:20

## 2025-05-23 RX ADMIN — OXYBUTYNIN CHLORIDE 5 MG: 5 TABLET ORAL at 14:15

## 2025-05-23 RX ADMIN — CIPROFLOXACIN 400 MG: 400 INJECTION, SOLUTION INTRAVENOUS at 16:45

## 2025-05-23 RX ADMIN — SODIUM CHLORIDE, SODIUM LACTATE, POTASSIUM CHLORIDE, AND CALCIUM CHLORIDE 100 ML/HR: .6; .31; .03; .02 INJECTION, SOLUTION INTRAVENOUS at 07:51

## 2025-05-23 RX ADMIN — IOHEXOL 75 ML: 350 INJECTION, SOLUTION INTRAVENOUS at 22:25

## 2025-05-23 RX ADMIN — SULFASALAZINE 500 MG: 500 TABLET ORAL at 08:23

## 2025-05-23 RX ADMIN — POLYETHYLENE GLYCOL 3350 17 G: 17 POWDER, FOR SOLUTION ORAL at 08:24

## 2025-05-23 RX ADMIN — METOPROLOL TARTRATE 12.5 MG: 25 TABLET, FILM COATED ORAL at 08:23

## 2025-05-23 RX ADMIN — ACETAMINOPHEN 1000 MG: 10 INJECTION INTRAVENOUS at 12:42

## 2025-05-23 RX ADMIN — SULFASALAZINE 500 MG: 500 TABLET ORAL at 16:37

## 2025-05-23 RX ADMIN — AMLODIPINE BESYLATE 5 MG: 10 TABLET ORAL at 08:23

## 2025-05-23 RX ADMIN — PANTOPRAZOLE SODIUM 40 MG: 40 INJECTION, POWDER, FOR SOLUTION INTRAVENOUS at 06:12

## 2025-05-23 RX ADMIN — ONDANSETRON HYDROCHLORIDE 4 MG: 4 TABLET, FILM COATED ORAL at 07:53

## 2025-05-23 RX ADMIN — IOHEXOL 60 ML: 12 SOLUTION ORAL at 22:26

## 2025-05-23 RX ADMIN — HEPARIN SODIUM 5000 UNITS: 5000 INJECTION, SOLUTION INTRAVENOUS; SUBCUTANEOUS at 16:37

## 2025-05-23 RX ADMIN — SULFASALAZINE 500 MG: 500 TABLET ORAL at 12:42

## 2025-05-23 RX ADMIN — PIPERACILLIN SODIUM AND TAZOBACTAM SODIUM 2.25 G: 2; .25 INJECTION, SOLUTION INTRAVENOUS at 23:12

## 2025-05-23 RX ADMIN — OXYBUTYNIN CHLORIDE 5 MG: 5 TABLET ORAL at 08:23

## 2025-05-23 RX ADMIN — METHYLPREDNISOLONE SODIUM SUCCINATE 40 MG: 125 INJECTION, POWDER, FOR SOLUTION INTRAMUSCULAR; INTRAVENOUS at 20:19

## 2025-05-23 RX ADMIN — HEPARIN SODIUM 5000 UNITS: 5000 INJECTION, SOLUTION INTRAVENOUS; SUBCUTANEOUS at 08:24

## 2025-05-23 RX ADMIN — HEPARIN SODIUM 5000 UNITS: 5000 INJECTION, SOLUTION INTRAVENOUS; SUBCUTANEOUS at 01:29

## 2025-05-23 SDOH — HEALTH STABILITY: MENTAL HEALTH: CURRENT SMOKER: 0

## 2025-05-23 ASSESSMENT — PAIN SCALES - GENERAL: PAINLEVEL_OUTOF10: 2

## 2025-05-23 NOTE — CARE PLAN
The patient's goals for the shift include Pain control    The clinical goals for the shift include hds/safety/comfort  Problem: Pain  Goal: Takes deep breaths with improved pain control throughout the shift  Outcome: Progressing  Goal: Turns in bed with improved pain control throughout the shift  Outcome: Progressing  Goal: Walks with improved pain control throughout the shift  Outcome: Progressing  Goal: Performs ADL's with improved pain control throughout shift  Outcome: Progressing  Goal: Participates in PT with improved pain control throughout the shift  Outcome: Progressing  Goal: Free from opioid side effects throughout the shift  Outcome: Progressing  Goal: Free from acute confusion related to pain meds throughout the shift  Outcome: Progressing     Problem: Skin  Goal: Decreased wound size/increased tissue granulation at next dressing change  Outcome: Progressing  Goal: Participates in plan/prevention/treatment measures  Outcome: Progressing  Goal: Prevent/manage excess moisture  Outcome: Progressing  Goal: Prevent/minimize sheer/friction injuries  Outcome: Progressing  Goal: Promote/optimize nutrition  Outcome: Progressing  Goal: Promote skin healing  Outcome: Progressing

## 2025-05-23 NOTE — PROGRESS NOTES
"Beatriz Raymond \"Slava" is a 79 y.o. female on day 1 of admission presenting with Hiatal hernia.    Subjective   Patient seen and evaluated this morning. Overnight, she was slightly confused with some sxs c/w delerium. Mild BRUCE this AM. Gabapentin discontinued. She is tolerating clears but had issues with swallowing her pills. Dysphagia in her upper throat, which was made worse when trying a protein shake.       Objective     Physical Exam  Physical Exam:   Constitutional: Well developed, awake/alert/oriented x3, no distress, alert and cooperative  Eyes: PERRL, EOMI, clear sclera  Head/Neck: Neck supple, no apparent injury, No JVD, trachea midline  Respiratory/Thorax: good chest expansion, thorax symmetric  Cardiovascular: Regular, rate and rhythm,  2+ equal pulses of the extremities  Gastrointestinal: Nondistended, soft, minimally tender, no rebound tenderness or guarding, no masses palpable, incisions c/d/i  Musculoskeletal: ROM intact, no joint swelling, normal strength  Extremities: normal extremities, no cyanosis edema, contusions or wounds, no clubbing  Neurological: alert and oriented x3, intact senses,  Psychological: Appropriate mood and behavior  Skin: Warm and dry, no lesions, no rashes    Last Recorded Vitals  Blood pressure 139/63, pulse 87, temperature 36.5 °C (97.7 °F), temperature source Temporal, resp. rate 18, height 1.626 m (5' 4.02\"), weight 69 kg (152 lb 1.9 oz), SpO2 97%.  Intake/Output last 3 Shifts:  I/O last 3 completed shifts:  In: 3026.7 (43.9 mL/kg) [I.V.:2526.7 (36.6 mL/kg); IV Piggyback:500]  Out: 1800 (26.1 mL/kg) [Urine:1800 (0.7 mL/kg/hr)]  Weight: 69 kg     Relevant Results                              Assessment & Plan  Hiatal hernia    79F s/p laparoscopic HHR with mesh and Nissen fundoplication on 5/21/25  - Continue diet  - will recheck CMP this afternoon  - avoid NSAIDs and gabapentin  - Pain control as needed  - Antiemetic as needed  - Continue IVF until discharge  - Encourage " IS/Ambulation  - Will consider discharge later today if meeting additional criteria####          I spent 60 minutes in the professional and overall care of this patient.      Eliud Hutton MD

## 2025-05-23 NOTE — CARE PLAN
The patient's goals for the shift include Pain control    The clinical goals for the shift include pain control      Problem: Pain  Goal: Takes deep breaths with improved pain control throughout the shift  Outcome: Progressing     Problem: Pain  Goal: Turns in bed with improved pain control throughout the shift  Outcome: Progressing     Problem: Pain  Goal: Walks with improved pain control throughout the shift  Outcome: Progressing     Problem: Pain  Goal: Free from opioid side effects throughout the shift  Outcome: Progressing     Problem: Skin  Goal: Participates in plan/prevention/treatment measures  Outcome: Progressing     Problem: Skin  Goal: Prevent/minimize sheer/friction injuries  Outcome: Progressing

## 2025-05-24 PROBLEM — K65.9 PERITONITIS: Status: ACTIVE | Noted: 2025-05-24

## 2025-05-24 PROBLEM — N17.9 ACUTE KIDNEY INJURY: Status: ACTIVE | Noted: 2025-05-24

## 2025-05-24 PROBLEM — M54.50 CHRONIC BILATERAL LOW BACK PAIN: Chronic | Status: ACTIVE | Noted: 2025-05-24

## 2025-05-24 PROBLEM — G89.29 CHRONIC BILATERAL LOW BACK PAIN: Chronic | Status: ACTIVE | Noted: 2025-05-24

## 2025-05-24 PROBLEM — A41.9 SEPSIS WITHOUT ACUTE ORGAN DYSFUNCTION (MULTI): Status: ACTIVE | Noted: 2025-05-24

## 2025-05-24 LAB
ALBUMIN SERPL BCP-MCNC: 2.7 G/DL (ref 3.4–5)
ALP SERPL-CCNC: 59 U/L (ref 33–136)
ALT SERPL W P-5'-P-CCNC: 25 U/L (ref 7–45)
ANION GAP SERPL CALC-SCNC: 16 MMOL/L (ref 10–20)
APTT PPP: 34 SECONDS (ref 26–36)
AST SERPL W P-5'-P-CCNC: 21 U/L (ref 9–39)
BACTERIA UR CULT: NO GROWTH
BASOPHILS # BLD MANUAL: 0 X10*3/UL (ref 0–0.1)
BASOPHILS NFR BLD MANUAL: 0 %
BILIRUB SERPL-MCNC: 0.5 MG/DL (ref 0–1.2)
BUN SERPL-MCNC: 34 MG/DL (ref 6–23)
CALCIUM SERPL-MCNC: 7.9 MG/DL (ref 8.6–10.3)
CHLORIDE SERPL-SCNC: 100 MMOL/L (ref 98–107)
CO2 SERPL-SCNC: 21 MMOL/L (ref 21–32)
CREAT SERPL-MCNC: 2.3 MG/DL (ref 0.5–1.05)
EGFRCR SERPLBLD CKD-EPI 2021: 21 ML/MIN/1.73M*2
EOSINOPHIL # BLD MANUAL: 0 X10*3/UL (ref 0–0.4)
EOSINOPHIL NFR BLD MANUAL: 0 %
ERYTHROCYTE [DISTWIDTH] IN BLOOD BY AUTOMATED COUNT: 13.6 % (ref 11.5–14.5)
GLUCOSE SERPL-MCNC: 151 MG/DL (ref 74–99)
HCT VFR BLD AUTO: 30 % (ref 36–46)
HGB BLD-MCNC: 9.3 G/DL (ref 12–16)
IMM GRANULOCYTES # BLD AUTO: 0 X10*3/UL (ref 0–0.5)
IMM GRANULOCYTES NFR BLD AUTO: 0 % (ref 0–0.9)
INR PPP: 1.2 (ref 0.9–1.1)
LACTATE SERPL-SCNC: 1.2 MMOL/L (ref 0.4–2)
LYMPHOCYTES # BLD MANUAL: 0.32 X10*3/UL (ref 0.8–3)
LYMPHOCYTES NFR BLD MANUAL: 6 %
MAGNESIUM SERPL-MCNC: 1.9 MG/DL (ref 1.6–2.4)
MCH RBC QN AUTO: 30.9 PG (ref 26–34)
MCHC RBC AUTO-ENTMCNC: 31 G/DL (ref 32–36)
MCV RBC AUTO: 100 FL (ref 80–100)
METAMYELOCYTES # BLD MANUAL: 0.16 X10*3/UL
METAMYELOCYTES NFR BLD MANUAL: 3 %
MONOCYTES # BLD MANUAL: 0.16 X10*3/UL (ref 0.05–0.8)
MONOCYTES NFR BLD MANUAL: 3 %
NEUTROPHILS # BLD MANUAL: 4.75 X10*3/UL (ref 1.6–5.5)
NEUTS BAND # BLD MANUAL: 3.13 X10*3/UL (ref 0–0.5)
NEUTS BAND NFR BLD MANUAL: 58 %
NEUTS SEG # BLD MANUAL: 1.62 X10*3/UL (ref 1.6–5)
NEUTS SEG NFR BLD MANUAL: 30 %
NEUTS VAC BLD QL SMEAR: PRESENT
NRBC BLD-RTO: 0 /100 WBCS (ref 0–0)
PHOSPHATE SERPL-MCNC: 4.7 MG/DL (ref 2.5–4.9)
PLATELET # BLD AUTO: 223 X10*3/UL (ref 150–450)
PLATELET CLUMP BLD QL SMEAR: PRESENT
POTASSIUM SERPL-SCNC: 5.3 MMOL/L (ref 3.5–5.3)
PROT SERPL-MCNC: 4.9 G/DL (ref 6.4–8.2)
PROTHROMBIN TIME: 13.2 SECONDS (ref 9.8–12.4)
RBC # BLD AUTO: 3.01 X10*6/UL (ref 4–5.2)
RBC MORPH BLD: ABNORMAL
SODIUM SERPL-SCNC: 132 MMOL/L (ref 136–145)
TOTAL CELLS COUNTED BLD: 100
TOXIC GRANULES BLD QL SMEAR: PRESENT
WBC # BLD AUTO: 5.4 X10*3/UL (ref 4.4–11.3)

## 2025-05-24 PROCEDURE — 2500000005 HC RX 250 GENERAL PHARMACY W/O HCPCS: Performed by: SURGERY

## 2025-05-24 PROCEDURE — 85007 BL SMEAR W/DIFF WBC COUNT: CPT | Performed by: HOSPITALIST

## 2025-05-24 PROCEDURE — 0D9670Z DRAINAGE OF STOMACH WITH DRAINAGE DEVICE, VIA NATURAL OR ARTIFICIAL OPENING: ICD-10-PCS

## 2025-05-24 PROCEDURE — 2500000004 HC RX 250 GENERAL PHARMACY W/ HCPCS (ALT 636 FOR OP/ED): Mod: JZ | Performed by: NURSE ANESTHETIST, CERTIFIED REGISTERED

## 2025-05-24 PROCEDURE — 2500000004 HC RX 250 GENERAL PHARMACY W/ HCPCS (ALT 636 FOR OP/ED): Performed by: ANESTHESIOLOGY

## 2025-05-24 PROCEDURE — 85027 COMPLETE CBC AUTOMATED: CPT | Performed by: HOSPITALIST

## 2025-05-24 PROCEDURE — 2500000005 HC RX 250 GENERAL PHARMACY W/O HCPCS: Performed by: ANESTHESIOLOGY

## 2025-05-24 PROCEDURE — 2500000004 HC RX 250 GENERAL PHARMACY W/ HCPCS (ALT 636 FOR OP/ED): Mod: JZ | Performed by: SURGERY

## 2025-05-24 PROCEDURE — 84100 ASSAY OF PHOSPHORUS: CPT | Performed by: HOSPITALIST

## 2025-05-24 PROCEDURE — 37799 UNLISTED PX VASCULAR SURGERY: CPT | Performed by: HOSPITALIST

## 2025-05-24 PROCEDURE — 43659 UNLISTED LAPS PX STOMACH: CPT | Performed by: SURGERY

## 2025-05-24 PROCEDURE — 99233 SBSQ HOSP IP/OBS HIGH 50: CPT

## 2025-05-24 PROCEDURE — 2500000001 HC RX 250 WO HCPCS SELF ADMINISTERED DRUGS (ALT 637 FOR MEDICARE OP): Performed by: SURGERY

## 2025-05-24 PROCEDURE — 2500000005 HC RX 250 GENERAL PHARMACY W/O HCPCS: Mod: JZ | Performed by: SURGERY

## 2025-05-24 PROCEDURE — 80053 COMPREHEN METABOLIC PANEL: CPT | Performed by: HOSPITALIST

## 2025-05-24 PROCEDURE — 1100000001 HC PRIVATE ROOM DAILY

## 2025-05-24 PROCEDURE — 2500000005 HC RX 250 GENERAL PHARMACY W/O HCPCS: Performed by: HOSPITALIST

## 2025-05-24 PROCEDURE — 85610 PROTHROMBIN TIME: CPT | Performed by: HOSPITALIST

## 2025-05-24 PROCEDURE — 83605 ASSAY OF LACTIC ACID: CPT | Performed by: HOSPITALIST

## 2025-05-24 PROCEDURE — 2500000004 HC RX 250 GENERAL PHARMACY W/ HCPCS (ALT 636 FOR OP/ED): Performed by: SURGERY

## 2025-05-24 PROCEDURE — 99291 CRITICAL CARE FIRST HOUR: CPT | Performed by: HOSPITALIST

## 2025-05-24 PROCEDURE — 2500000004 HC RX 250 GENERAL PHARMACY W/ HCPCS (ALT 636 FOR OP/ED): Mod: JZ | Performed by: HOSPITALIST

## 2025-05-24 PROCEDURE — 83735 ASSAY OF MAGNESIUM: CPT | Performed by: HOSPITALIST

## 2025-05-24 RX ORDER — ALBUTEROL SULFATE 0.83 MG/ML
2.5 SOLUTION RESPIRATORY (INHALATION) ONCE AS NEEDED
Status: DISCONTINUED | OUTPATIENT
Start: 2025-05-24 | End: 2025-05-24 | Stop reason: HOSPADM

## 2025-05-24 RX ORDER — NORETHINDRONE AND ETHINYL ESTRADIOL 0.5-0.035
KIT ORAL AS NEEDED
Status: DISCONTINUED | OUTPATIENT
Start: 2025-05-24 | End: 2025-05-24

## 2025-05-24 RX ORDER — ONDANSETRON HYDROCHLORIDE 2 MG/ML
INJECTION, SOLUTION INTRAVENOUS AS NEEDED
Status: DISCONTINUED | OUTPATIENT
Start: 2025-05-24 | End: 2025-05-24

## 2025-05-24 RX ORDER — ACETAMINOPHEN 325 MG/1
650 TABLET ORAL EVERY 4 HOURS PRN
Status: DISCONTINUED | OUTPATIENT
Start: 2025-05-24 | End: 2025-05-24 | Stop reason: HOSPADM

## 2025-05-24 RX ORDER — FLUCONAZOLE 2 MG/ML
200 INJECTION, SOLUTION INTRAVENOUS EVERY 24 HOURS
Status: DISCONTINUED | OUTPATIENT
Start: 2025-05-25 | End: 2025-05-24

## 2025-05-24 RX ORDER — HEPARIN SODIUM 5000 [USP'U]/ML
5000 INJECTION, SOLUTION INTRAVENOUS; SUBCUTANEOUS EVERY 8 HOURS
Status: DISCONTINUED | OUTPATIENT
Start: 2025-05-24 | End: 2025-06-06 | Stop reason: HOSPADM

## 2025-05-24 RX ORDER — ACETAMINOPHEN 10 MG/ML
1000 INJECTION, SOLUTION INTRAVENOUS EVERY 6 HOURS
Status: DISCONTINUED | OUTPATIENT
Start: 2025-05-24 | End: 2025-05-24

## 2025-05-24 RX ORDER — FENTANYL CITRATE 50 UG/ML
INJECTION, SOLUTION INTRAMUSCULAR; INTRAVENOUS AS NEEDED
Status: DISCONTINUED | OUTPATIENT
Start: 2025-05-24 | End: 2025-05-24

## 2025-05-24 RX ORDER — ACETAMINOPHEN 10 MG/ML
1000 INJECTION, SOLUTION INTRAVENOUS EVERY 6 HOURS
Status: COMPLETED | OUTPATIENT
Start: 2025-05-24 | End: 2025-05-25

## 2025-05-24 RX ORDER — SODIUM CHLORIDE, SODIUM LACTATE, POTASSIUM CHLORIDE, CALCIUM CHLORIDE 600; 310; 30; 20 MG/100ML; MG/100ML; MG/100ML; MG/100ML
100 INJECTION, SOLUTION INTRAVENOUS CONTINUOUS
Status: DISCONTINUED | OUTPATIENT
Start: 2025-05-24 | End: 2025-05-24 | Stop reason: HOSPADM

## 2025-05-24 RX ORDER — PROCHLORPERAZINE EDISYLATE 5 MG/ML
5 INJECTION INTRAMUSCULAR; INTRAVENOUS ONCE AS NEEDED
Status: DISCONTINUED | OUTPATIENT
Start: 2025-05-24 | End: 2025-05-24 | Stop reason: HOSPADM

## 2025-05-24 RX ORDER — SODIUM CHLORIDE, SODIUM LACTATE, POTASSIUM CHLORIDE, CALCIUM CHLORIDE 600; 310; 30; 20 MG/100ML; MG/100ML; MG/100ML; MG/100ML
100 INJECTION, SOLUTION INTRAVENOUS CONTINUOUS
Status: DISCONTINUED | OUTPATIENT
Start: 2025-05-24 | End: 2025-05-24

## 2025-05-24 RX ORDER — SODIUM CHLORIDE 0.9 G/100ML
INJECTION, SOLUTION IRRIGATION AS NEEDED
Status: DISCONTINUED | OUTPATIENT
Start: 2025-05-24 | End: 2025-05-24 | Stop reason: HOSPADM

## 2025-05-24 RX ORDER — IPRATROPIUM BROMIDE 0.5 MG/2.5ML
500 SOLUTION RESPIRATORY (INHALATION) ONCE
Status: DISCONTINUED | OUTPATIENT
Start: 2025-05-24 | End: 2025-05-24 | Stop reason: HOSPADM

## 2025-05-24 RX ORDER — HYDROMORPHONE HYDROCHLORIDE 0.2 MG/ML
0.2 INJECTION INTRAMUSCULAR; INTRAVENOUS; SUBCUTANEOUS
Status: DISCONTINUED | OUTPATIENT
Start: 2025-05-24 | End: 2025-06-04

## 2025-05-24 RX ORDER — SODIUM CHLORIDE, SODIUM LACTATE, POTASSIUM CHLORIDE, CALCIUM CHLORIDE 600; 310; 30; 20 MG/100ML; MG/100ML; MG/100ML; MG/100ML
100 INJECTION, SOLUTION INTRAVENOUS CONTINUOUS
Status: DISCONTINUED | OUTPATIENT
Start: 2025-05-24 | End: 2025-05-25

## 2025-05-24 RX ORDER — LIDOCAINE HYDROCHLORIDE 10 MG/ML
0.1 INJECTION, SOLUTION INFILTRATION; PERINEURAL ONCE
Status: DISCONTINUED | OUTPATIENT
Start: 2025-05-24 | End: 2025-05-24 | Stop reason: HOSPADM

## 2025-05-24 RX ORDER — FLUCONAZOLE 2 MG/ML
400 INJECTION, SOLUTION INTRAVENOUS EVERY 24 HOURS
Status: DISCONTINUED | OUTPATIENT
Start: 2025-05-24 | End: 2025-05-24

## 2025-05-24 RX ORDER — FLUCONAZOLE 2 MG/ML
200 INJECTION, SOLUTION INTRAVENOUS EVERY 24 HOURS
Status: DISCONTINUED | OUTPATIENT
Start: 2025-05-25 | End: 2025-06-05

## 2025-05-24 RX ORDER — PHENYLEPHRINE HCL IN 0.9% NACL 1 MG/10 ML
SYRINGE (ML) INTRAVENOUS AS NEEDED
Status: DISCONTINUED | OUTPATIENT
Start: 2025-05-24 | End: 2025-05-24

## 2025-05-24 RX ORDER — ONDANSETRON HYDROCHLORIDE 2 MG/ML
4 INJECTION, SOLUTION INTRAVENOUS ONCE AS NEEDED
Status: DISCONTINUED | OUTPATIENT
Start: 2025-05-24 | End: 2025-05-24 | Stop reason: HOSPADM

## 2025-05-24 RX ORDER — PROPOFOL 10 MG/ML
INJECTION, EMULSION INTRAVENOUS AS NEEDED
Status: DISCONTINUED | OUTPATIENT
Start: 2025-05-24 | End: 2025-05-24

## 2025-05-24 RX ORDER — METOPROLOL TARTRATE 1 MG/ML
5 INJECTION, SOLUTION INTRAVENOUS EVERY OTHER DAY
Status: DISCONTINUED | OUTPATIENT
Start: 2025-05-24 | End: 2025-05-27

## 2025-05-24 RX ORDER — BUPIVACAINE HYDROCHLORIDE 2.5 MG/ML
INJECTION, SOLUTION EPIDURAL; INFILTRATION; INTRACAUDAL; PERINEURAL AS NEEDED
Status: DISCONTINUED | OUTPATIENT
Start: 2025-05-24 | End: 2025-05-24 | Stop reason: HOSPADM

## 2025-05-24 RX ORDER — HYDROMORPHONE HYDROCHLORIDE 1 MG/ML
0.6 INJECTION, SOLUTION INTRAMUSCULAR; INTRAVENOUS; SUBCUTANEOUS
Status: DISCONTINUED | OUTPATIENT
Start: 2025-05-24 | End: 2025-06-04

## 2025-05-24 RX ORDER — SUCCINYLCHOLINE CHLORIDE 20 MG/ML INJECTION SOLUTION
SOLUTION AS NEEDED
Status: DISCONTINUED | OUTPATIENT
Start: 2025-05-24 | End: 2025-05-24

## 2025-05-24 RX ORDER — ROCURONIUM BROMIDE 10 MG/ML
INJECTION, SOLUTION INTRAVENOUS AS NEEDED
Status: DISCONTINUED | OUTPATIENT
Start: 2025-05-24 | End: 2025-05-24

## 2025-05-24 RX ORDER — LIDOCAINE HYDROCHLORIDE 20 MG/ML
INJECTION, SOLUTION INFILTRATION; PERINEURAL AS NEEDED
Status: DISCONTINUED | OUTPATIENT
Start: 2025-05-24 | End: 2025-05-24

## 2025-05-24 RX ADMIN — EPHEDRINE SULFATE 10 MG: 50 INJECTION, SOLUTION INTRAVENOUS at 00:17

## 2025-05-24 RX ADMIN — BUPIVACAINE HYDROCHLORIDE 52 ML: 2.5 INJECTION, SOLUTION EPIDURAL; INFILTRATION; INTRACAUDAL; PERINEURAL at 01:31

## 2025-05-24 RX ADMIN — FLUCONAZOLE 400 MG: 2 INJECTION, SOLUTION INTRAVENOUS at 00:04

## 2025-05-24 RX ADMIN — PANTOPRAZOLE SODIUM 40 MG: 40 INJECTION, POWDER, FOR SOLUTION INTRAVENOUS at 06:16

## 2025-05-24 RX ADMIN — PIPERACILLIN SODIUM AND TAZOBACTAM SODIUM 3.38 G: 3; .375 INJECTION, SOLUTION INTRAVENOUS at 22:41

## 2025-05-24 RX ADMIN — PIPERACILLIN SODIUM AND TAZOBACTAM SODIUM 3.38 G: 3; .375 INJECTION, SOLUTION INTRAVENOUS at 14:58

## 2025-05-24 RX ADMIN — SODIUM CHLORIDE, SODIUM LACTATE, POTASSIUM CHLORIDE, AND CALCIUM CHLORIDE 100 ML/HR: .6; .31; .03; .02 INJECTION, SOLUTION INTRAVENOUS at 22:15

## 2025-05-24 RX ADMIN — ROCURONIUM BROMIDE 50 MG: 10 INJECTION INTRAVENOUS at 00:15

## 2025-05-24 RX ADMIN — Medication 100 MG: at 00:15

## 2025-05-24 RX ADMIN — Medication 2 L/MIN: at 20:00

## 2025-05-24 RX ADMIN — SODIUM CHLORIDE 3000 ML: 900 IRRIGANT IRRIGATION at 00:28

## 2025-05-24 RX ADMIN — PIPERACILLIN SODIUM AND TAZOBACTAM SODIUM 3.38 G: 3; .375 INJECTION, SOLUTION INTRAVENOUS at 05:46

## 2025-05-24 RX ADMIN — LIDOCAINE HYDROCHLORIDE 60 MG: 20 INJECTION, SOLUTION INFILTRATION; PERINEURAL at 00:15

## 2025-05-24 RX ADMIN — SODIUM CHLORIDE, SODIUM LACTATE, POTASSIUM CHLORIDE, AND CALCIUM CHLORIDE 500 ML: .6; .31; .03; .02 INJECTION, SOLUTION INTRAVENOUS at 04:50

## 2025-05-24 RX ADMIN — HEPARIN SODIUM 5000 UNITS: 5000 INJECTION, SOLUTION INTRAVENOUS; SUBCUTANEOUS at 22:03

## 2025-05-24 RX ADMIN — ONDANSETRON 4 MG: 2 INJECTION INTRAMUSCULAR; INTRAVENOUS at 01:30

## 2025-05-24 RX ADMIN — FENTANYL CITRATE 50 MCG: 50 INJECTION, SOLUTION INTRAMUSCULAR; INTRAVENOUS at 00:26

## 2025-05-24 RX ADMIN — SODIUM CHLORIDE, POTASSIUM CHLORIDE, SODIUM LACTATE AND CALCIUM CHLORIDE: 600; 310; 30; 20 INJECTION, SOLUTION INTRAVENOUS at 00:15

## 2025-05-24 RX ADMIN — BENZOCAINE AND MENTHOL 1 LOZENGE: 15; 3.6 LOZENGE ORAL at 22:04

## 2025-05-24 RX ADMIN — SODIUM CHLORIDE, SODIUM LACTATE, POTASSIUM CHLORIDE, AND CALCIUM CHLORIDE 100 ML/HR: .6; .31; .03; .02 INJECTION, SOLUTION INTRAVENOUS at 08:30

## 2025-05-24 RX ADMIN — DEXAMETHASONE SODIUM PHOSPHATE 4 MG: 4 INJECTION, SOLUTION INTRAMUSCULAR; INTRAVENOUS at 00:42

## 2025-05-24 RX ADMIN — PROPOFOL 80 MG: 10 INJECTION, EMULSION INTRAVENOUS at 00:15

## 2025-05-24 RX ADMIN — Medication 200 MCG: at 00:12

## 2025-05-24 RX ADMIN — FENTANYL CITRATE 50 MCG: 50 INJECTION, SOLUTION INTRAMUSCULAR; INTRAVENOUS at 00:25

## 2025-05-24 RX ADMIN — Medication 2 L/MIN: at 04:36

## 2025-05-24 RX ADMIN — ACETAMINOPHEN 1000 MG: 10 INJECTION INTRAVENOUS at 22:02

## 2025-05-24 RX ADMIN — FENTANYL CITRATE 100 MCG: 50 INJECTION, SOLUTION INTRAMUSCULAR; INTRAVENOUS at 00:15

## 2025-05-24 RX ADMIN — METOPROLOL TARTRATE 5 MG: 5 INJECTION INTRAVENOUS at 22:03

## 2025-05-24 RX ADMIN — Medication 200 MCG: at 00:14

## 2025-05-24 RX ADMIN — ACETAMINOPHEN 1000 MG: 10 INJECTION INTRAVENOUS at 17:30

## 2025-05-24 ASSESSMENT — PAIN SCALES - GENERAL
PAINLEVEL_OUTOF10: 3
PAINLEVEL_OUTOF10: 3

## 2025-05-24 ASSESSMENT — PAIN DESCRIPTION - DESCRIPTORS
DESCRIPTORS: DISCOMFORT
DESCRIPTORS: DISCOMFORT

## 2025-05-24 ASSESSMENT — COGNITIVE AND FUNCTIONAL STATUS - GENERAL
CLIMB 3 TO 5 STEPS WITH RAILING: A LOT
TURNING FROM BACK TO SIDE WHILE IN FLAT BAD: A LITTLE
MOVING TO AND FROM BED TO CHAIR: A LITTLE
MOBILITY SCORE: 17
HELP NEEDED FOR BATHING: A LOT
DRESSING REGULAR LOWER BODY CLOTHING: A LOT
TOILETING: A LOT
MOVING FROM LYING ON BACK TO SITTING ON SIDE OF FLAT BED WITH BEDRAILS: A LITTLE
DRESSING REGULAR UPPER BODY CLOTHING: A LITTLE
WALKING IN HOSPITAL ROOM: A LITTLE
STANDING UP FROM CHAIR USING ARMS: A LITTLE
DAILY ACTIVITIY SCORE: 17

## 2025-05-24 ASSESSMENT — PAIN - FUNCTIONAL ASSESSMENT: PAIN_FUNCTIONAL_ASSESSMENT: 0-10

## 2025-05-24 NOTE — ANESTHESIA PROCEDURE NOTES
Airway  Date/Time: 5/24/2025 12:15 AM  Reason: emergent    Airway not difficult    Staffing  Performed: attending   Authorized by: Eron Britt MD    Performed by: Eron Britt MD  Patient location during procedure: OR    Consent for Airway (if performed for an anesthetic, see related documentation for consents)   Patient identity confirmed: verbally with patient, arm band, provided demographic data and hospital-assigned identification number  Consent given by: patient    Patient Condition  Indications for airway management: anesthesia  Patient position: sniffing  Planned trial extubation  Sedation level: deep     Final Airway Details   Preoxygenated: yes  Final airway type: endotracheal airway  Successful airway: ETT  Cuffed: yes   Successful intubation technique: video laryngoscopy  Adjuncts used in placement: intubating stylet  Blade: Bj  Blade size: #3  ETT size (mm): 7.0  Cormack-Lehane Classification: grade I - full view of glottis  Placement verified by: capnometry   Measured from: lips  ETT to lips (cm): 20  Number of attempts at approach: 1  Number of other approaches attempted: 0

## 2025-05-24 NOTE — CARE PLAN
Problem: Pain  Goal: Takes deep breaths with improved pain control throughout the shift  Outcome: Progressing  Goal: Turns in bed with improved pain control throughout the shift  Outcome: Progressing  Goal: Walks with improved pain control throughout the shift  Outcome: Progressing  Goal: Performs ADL's with improved pain control throughout shift  Outcome: Progressing  Goal: Participates in PT with improved pain control throughout the shift  Outcome: Progressing  Goal: Free from opioid side effects throughout the shift  Outcome: Progressing  Goal: Free from acute confusion related to pain meds throughout the shift  Outcome: Progressing     Problem: Skin  Goal: Decreased wound size/increased tissue granulation at next dressing change  Outcome: Progressing  Flowsheets (Taken 5/24/2025 1752)  Decreased wound size/increased tissue granulation at next dressing change: Promote sleep for wound healing  Goal: Participates in plan/prevention/treatment measures  Outcome: Progressing  Flowsheets (Taken 5/24/2025 1752)  Participates in plan/prevention/treatment measures: Elevate heels  Goal: Prevent/manage excess moisture  Outcome: Progressing  Flowsheets (Taken 5/24/2025 1752)  Prevent/manage excess moisture: Monitor for/manage infection if present  Goal: Prevent/minimize sheer/friction injuries  Outcome: Progressing  Flowsheets (Taken 5/24/2025 1752)  Prevent/minimize sheer/friction injuries: Use pull sheet  Goal: Promote/optimize nutrition  Outcome: Progressing  Flowsheets (Taken 5/24/2025 1752)  Promote/optimize nutrition: Monitor/record intake including meals  Goal: Promote skin healing  Outcome: Progressing  Flowsheets (Taken 5/24/2025 1752)  Promote skin healing: Turn/reposition every 2 hours/use positioning/transfer devices   The patient's goals for the shift include Pain control    The clinical goals for the shift include comfort and safety

## 2025-05-24 NOTE — SIGNIFICANT EVENT
79-year-old female who underwent laparoscopic hiatal hernia repair with mesh and Nissen fundoplication 2 days ago.  This evening, she developed tachycardia to the 130s.  Sinus tachycardia at 121 on her EKG.  I saw and evaluated the patient at bedside.  Her hemodynamics are stable.  She was conversing in full sentences but did appear somewhat confused, possibly with a degree of delirium.  Expeditiously obtained cross-sectional imaging.  Discussed with attending radiologist about the risks and benefits of a 4-hour steroid prep, given her iodine allergy.  We therefore proceeded with a stat CT head and CT pulmonary embolus study.  The CTH was negative for any acute process, and no large pulmonary embolus was evident.  However the patient had a large amount of free intra-abdominal fluid with extravasated contrast, and on discussion with the attending radiologist, there is a high suspicion for perforated viscus.  Discussed with the patient's  about the diagnostic findings.  We will take the patient emergently to the operating room for laparoscopic, possible exploration.  Risks and benefits discussed with the patient's .  The patient was sleeping during this interview and unable to participate with decision making at this time.    Discussed with Dr. Barfield, attending physician.

## 2025-05-24 NOTE — PROGRESS NOTES
"Beatriz Raymond \"Slava" is a 79 y.o. female on day 1 of admission presenting with Hiatal hernia.    Subjective   Please see the admit note from overnight for details of pertinent events.  Patient seen and evaluated this morning in the ICU.  She is status post return to the operating room for diagnostic laparoscopy and partial gastrectomy of her perforated portion of the fundoplication.  This morning, she is mentating much better and feels much better as well.  Her pain is minimal.  Serosanguineous output from the ELENI drain with scant.  Nasogastric tube with bilious output.  Afebrile without any hypotension or marked tachycardia.       Objective     Physical Exam  Physical Exam:   Constitutional: Well developed, awake/alert/oriented x3, no distress, alert and cooperative  Eyes: PERRL, EOMI, clear sclera  Head/Neck: Neck supple, no apparent injury, No JVD, trachea midline  Respiratory/Thorax: good chest expansion, thorax symmetric  Cardiovascular: Regular, rate and rhythm,  2+ equal pulses of the extremities  Gastrointestinal: Nondistended, soft, minimally tender, no rebound tenderness or guarding, no masses palpable, incisions c/d/I, ELENI with SS output  Musculoskeletal: ROM intact, no joint swelling, normal strength  Extremities: normal extremities, no cyanosis edema, contusions or wounds, no clubbing  Neurological: alert and oriented x3, intact senses,  Psychological: Appropriate mood and behavior  Skin: Warm and dry, no lesions, no rashes    Last Recorded Vitals  Blood pressure 111/59, pulse 81, temperature 36.7 °C (98.1 °F), temperature source Temporal, resp. rate (!) 27, height 1.626 m (5' 4.02\"), weight 78.5 kg (173 lb 1 oz), SpO2 96%.  Intake/Output last 3 Shifts:  I/O last 3 completed shifts:  In: 4098.2 (52.2 mL/kg) [I.V.:1089.8 (13.9 mL/kg); IV Piggyback:3008.3]  Out: 1168 (14.9 mL/kg) [Urine:1098 (0.4 mL/kg/hr); Drains:50; Blood:20]  Weight: 78.5 kg     Relevant Results                              Assessment & " Plan  Hiatal hernia    Perforated viscus    Sepsis without acute organ dysfunction (Multi)    Acute kidney injury    Peritonitis    Chronic bilateral low back pain    79F s/p laparoscopic HHR with mesh and Nissen fundoplication on 5/21/25, who developed a leak in the proximal portion of the fundoplication status post diagnostic laparoscopy and partial gastrectomy, and redo fundoplication on 5/20/2025.  She is doing well postoperatively and recovering well in the ICU  - Strict n.p.o. with NG tube to low continuous wall suction  - Daily labs  - Mechanical and chemical DVT prophylaxis  - Avoid NSAIDs and gabapentin  - Continue on 100 L of LR, monitor BRUCE  - Antiemetics as needed  - Out of bed to chair, may ambulate if she wishes  - Will plan for an upper GI study on Monday, and consider removal of NG tube and initiation of clear liquids thereafter if there is no persistent leak  -May downgrade from ICU from the surgical standpoint          I spent 60 minutes in the professional and overall care of this patient.      Eliud Hutton MD

## 2025-05-24 NOTE — BRIEF OP NOTE
"Date: 2025 - 2025  OR Location: [facility]    Name: Beatriz Raymond \"Slava", : 1945, Age: 79 y.o., MRN: [id number], Sex: female    Diagnosis  Pre-op Diagnosis      * Perforated viscus [R19.8] Post-op Diagnosis     * Perforated viscus [R19.8]     Procedures  EXPLORATORY LAPAROSCOPY, SEGMENTAL RESECTION OF STOMACH; EGD; TAP BLOCK  24950 - AK LAPS ABD PRTM&OMENTUM DX W/WO SPEC BR/WA SPX      Surgeons      * Alexander Barfield - Primary    Resident/Fellow/Other Assistant:  Surgeons and Role:     * Eliud Hutton MD - Fellow    Staff:   Circulator: Emma  Surgical Assistant: James Cash Person: Niki    Anesthesia Staff: Anesthesiologist: Eron Britt MD  CRNA: LONNIE Wells-CRNA    Procedure Summary  Anesthesia: General  ASA: III  Estimated Blood Loss: 20mL  Intra-op Medications:   Administrations occurring from 25 2330 to 25 0135:   Medication Name Total Dose   sodium chloride 0.9 % irrigation solution 3,000 mL   bupivacaine PF 0.25 % (Marcaine) 0.25 % (2.5 mg/mL) injection 52 mL   acetaminophen (Ofirmev) injection 1,000 mg Cannot be calculated   bisacodyl (Dulcolax) suppository 10 mg Cannot be calculated   dexAMETHasone (Decadron) 4 mg/mL IV Syringe 2 mL 4 mg   ePHEDrine injection 10 mg   fentaNYL (Sublimaze) injection 50 mcg/mL 200 mcg   fluconazole (Diflucan) 400 mg in sodium chloride (iso)  mL 400 mg   LR bolus Cannot be calculated   LR bolus Cannot be calculated   lidocaine (Xylocaine) injection 2 % 60 mg   lidocaine 4 % patch 1 patch Cannot be calculated   ondansetron (Zofran) 2 mg/mL injection 4 mg   oxyBUTYnin (Ditropan) tablet 5 mg Cannot be calculated   phenylephrine 100 mcg/mL syringe 10 mL (prefilled) 400 mcg   piperacillin-tazobactam (Zosyn) 2.25 g in dextrose (iso) IV 50 mL Cannot be calculated   propofol (Diprivan) injection 10 mg/mL 80 mg   rocuronium (ZeMuron) 50 mg/5 mL injection 50 mg   succinylcholine chloride injection syringe 200 mg/10 ml 100 mg "              Anesthesia Record               Intraprocedure I/O Totals          Intake    fluconazole (Diflucan) 400 mg in sodium chloride (iso)  mL 200.00 mL    Total Intake 200 mL       Output    Urine 200 mL    Total Output 200 mL       Net    Net Volume 0 mL          Specimen: No specimens collected               Findings: 600 ml of succus in the abdomen, full thickness perforation in the gastric fundus at the proximal right portion of the fundoplication. Friable and fibrinous tissue throughout the abdomen, especially in the area of the perforation requiring partial gastrectomy    Details of the procedure:  Patient who underwent hiatal hernia repair with Nissen fundoplication earlier on Wednesday was doing fine with negative upper GI on postoperative day #1 and was started on liquid diet for which she was tolerating well however postoperative day #2 by evening she was noted to be tachycardic and had altered mental status imaging studies were obtained quickly and it revealed viscus perforation.  Findings were discussed with the spouse and the patient and we decided to proceed with emergent diagnostic laparoscopy.  After anesthesia evaluation she was taken to the OR, placed in supine position on the table, SCDs were placed, huddle was done, antibiotics were given, general anesthesia was established, sterile preparation and draping of abdominal wall was performed.  Mickey cavity was entered using previous incisions in the left upper quadrant without technical problems, pneumoperitoneum was established, spillage of gastric contents were noted in the upper abdomen.  With fibrinous exudate in that area.  Additional trocars were placed using the same previous incisions and left lobe of liver was retracted.  Copious irrigation of aspiration abdominal cavity was performed.  Intraoperative endoscopy was performed and leak was identified with air bubbles coming out of the wrap suturing site.  The sutures were taken  down and we identified a small perforation at the suture site however the tissue was very friable and I initially I tried to repair it however I was not satisfied with the quality of tissue and it looked fairly blue after suturing.  I decided to resect that segment.  The edge of the wrap was then resected using green load covered with SEAMGUARD.  It was then sutured to the anterior stomach surface with 3-0 absorbable V-Loc to keep the wrap in place.  Once again copious irrigation aspiration abdominal cavity was performed.  Tap block was performed.  Endoscopy was repeated it was negative for any ongoing leak.  19 Libyan Melvin drain was placed in the left upper quadrant brought out through the 5 mm port on the left axillary site and secured with nylon suture.  Fascial defect at 12 mm port sites were closed with interrupted 0 Vicryl sutures under laparoscopic vision.  After final satisfactory examination abdominal cavity liver retractor and remaining trocars were removed, pneumoperitoneum was discontinued, skin was closed with 3-0 Monocryl, Dermabond was applied.  Patient tolerated the operation well, was extubated in the OR and transferred to recovery room in stable condition.  Family was updated with the findings.      Dr. Hutton was scrubbed and actively assisted in the entire laparoscopic, endoscopic components of the operation.  There was no qualified resident available.      Complications:  None; patient tolerated the procedure well.     Disposition: PACU - hemodynamically stable.  Condition: stable  Specimens Collected: No specimens collected  Attending Attestation: I was present and scrubbed for the entire procedure.    Alexander Barfield  Phone Number: [phone]

## 2025-05-24 NOTE — ANESTHESIA PREPROCEDURE EVALUATION
"Patient: Beatriz Raymond \"Ginny\"    Procedure Information       Date/Time: 05/23/25 3659    Procedure: LAPAROSCOPY, EXPLORATORY    Location: [facility] 05 / [facility]    Surgeons: Alexander Barfield MD            Relevant Problems   Cardiac   (+) Hypertension   (+) Pure hypercholesterolemia   (+) Systolic murmur      GI   (+) Gastroesophageal reflux disease without esophagitis   (+) Hiatal hernia   (+) IBS (irritable bowel syndrome)      /Renal   (+) Hydronephrosis      Liver   (+) Fatty (change of) liver, not elsewhere classified      Endocrine   (+) Non-toxic multinodular goiter      Hematology   (+) Anemia   (+) Iron deficiency anemia secondary to blood loss (chronic)      Musculoskeletal   (+) Osteoarthritis       Clinical information reviewed:    Allergies  Meds  Problems              NPO Detail:  No data recorded     Physical Exam    Airway  Mallampati: II  TM distance: >3 FB  Neck ROM: full  Mouth opening: 3 or more finger widths     Cardiovascular - normal exam  Rhythm: regular  Rate: normal     Dental - normal exam     Pulmonary - normal exam   Abdominal            Anesthesia Plan    History of general anesthesia?: yes  History of complications of general anesthesia?: no    ASA 3 - emergent     general   (POSSIBLE A-LINE PLACEMENT. POSSIBLE POSTOP VENTILATION)  The patient is not a current smoker.  Education provided regarding risk of obstructive sleep apnea.  intravenous induction   Postoperative pain plan includes opioids.  Trial extubation is planned.  Anesthetic plan and risks discussed with patient.  Use of blood products discussed with patient who consented to blood products.    Plan discussed with CRNA.      "

## 2025-05-24 NOTE — PROGRESS NOTES
"  MICU PROGRESS NOTE    Subjective    Patient seen and examined. States that she has throat soreness from the ETT.    Assessment & Plan   Beatriz Raymond \"Slava" is a 79 y.o. 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 Patient did not require any pressors or transfusion during the procedure. Patient was extubated successfully postoperatively and patient was transferred to ICU for observation overnight.  On day 1 of admission.    Plan:    NEUROLOGICAL / PSYCH:  Dx:  Expected postop pain  Risk of nausea  Management:  Analgesia and antiemetic therapy  Lidocaine patch, 5 & 10 mg oxycodone q4h PRN, Dilaudid 0.5 q4h PRN  Zofran PRN    CARDIOVASCULAR:  Dx:  No acute issues  Management:  Telemetry  Continue 100 cc/h LR  Hold home meds (5 mg amlodipine, 25 mg Lopressor, 25 mg Cozaar)    PULMONARY:  Dx:  Small bilateral pleural effusions with dependent atelectasis  Management:  Maintain SpO2 >94%    RENAL / GENITOURINARY:  Dx:  BRUCE 2/2 sepsis, prerenal etiology  Management:  Continue resuscitation ( cc/h)  Marginal UOP, monitor I's and O's and renal function  Avoid nephrotoxicity    GASTROENTEROLOGY:  Dx:  Hiatal hernia s/p fundoplication complicated by perforation of stomach s/p resection and repair  Peritonitis 2/2 perforation of viscus  1 cm hypodense right lobar hepatic lesion  Hx GERD  Management:  Maintain n.p.o. status, NG tube suction  Continue Zosyn and Diflucan, follow infectious workup (peritoneal fluid Cx), de-escalate appropriately  Continue scheduled PPI, scheduled MiraLAX, simethicone PRN  Only 50 cc drain output (harrison, serosanguineous), " drain site C/D/I without surrounding fluctuance, purulence  Consider follow-up MRI/US for hepatic lesion  Surgery following, appreciate recs  Mechanical and chemical DVT PPx  Avoid NSAIDs, gabapentin  OOB to chair, may ambulate if she wishes  Planning for upper GI study on Monday, consider removal of NGT, initiation of CLD thereafter if no leak found  May downgrade from ICU from surgical standpoint    ENDOCRINOLOGY:  Dx:  No acute issues  [facility]  [facility]    HEMATOLOGY / ONCOLOGY:  Dx:  Anemia, acute on chronic 2/2 blood loss  Management:  Monitor CBC and for bleeding.  Maintain type and screen.  Transfuse for hemoglobin<7, platelets<10k or <20k in the presence of bleeding  Hep SQ and SCDs for DVT PPx    MUSCULOSKELETAL / SKIN:  Dx:  No acute issues  Management:  OOB to chair, may ambulate if she wishes  ICU skin checks, roll side-to-side    INFECTIOUS DISEASE:  Dx:  Perforated viscus  Management:  Continue Zosyn and Diflucan, follow cultures from peritoneal fluid, de-escalate based on cultures and sensitivities    Incidental finding: Several hypodense thyroid lesions measuring up to 1.5 cm, consider follow-up ultrasound    Checklist:  Antimicrobials: Zosyn Diflucan  Oxygen: -  Feeding: N.p.o.  Fluids: 100 cc/h LR  DVT ppx: Hep SQ and SCDs  Ulcer ppx: PPI  Glycemic control: POCT PRN  Bowel care: As above  Lines and indwelling catheters: Left brachial arterial line, PIV's, close/suction drain LUQ bulb, NGT right nostril, Jenkins  Consults: General surgery  Code Status: Full code    Dr. Jluis Grayson, DO  PGY-2, Internal Medicine    This is a preliminary note, please await attending attestation for final recommendations    Disclaimer: Documentation completed with the information available at the time of input. The times in the chart may not be reflective of actual patient care times, interventions, or procedures. Documentation occurs after the physical care of the patient.     Objective   Physical  Exam  Constitutional:       General: She is not in acute distress.     Appearance: She is ill-appearing.   HENT:      Mouth/Throat:      Mouth: Mucous membranes are dry.      Pharynx: Oropharynx is clear.   Eyes:      Extraocular Movements: Extraocular movements intact.      Pupils: Pupils are equal, round, and reactive to light.   Cardiovascular:      Rate and Rhythm: Normal rate and regular rhythm.      Pulses: Normal pulses.      Heart sounds: Normal heart sounds.   Pulmonary:      Effort: Pulmonary effort is normal.      Breath sounds: Normal breath sounds.   Abdominal:      General: There is no distension.      Palpations: Abdomen is soft.      Tenderness: There is generalized abdominal tenderness. There is no guarding or rebound.      Comments: Incisions C/D/I, ELENI with serosanguineous output   Skin:     General: Skin is warm and dry.      Capillary Refill: Capillary refill takes less than 2 seconds.   Neurological:      General: No focal deficit present.      Mental Status: She is alert and oriented to person, place, and time.   Psychiatric:         Mood and Affect: Mood normal.         Behavior: Behavior normal.     Last Recorded Vitals  Vitals:    05/24/25 1000 05/24/25 1100 05/24/25 1200 05/24/25 1300   BP:       BP Location:       Patient Position:       Pulse: 83 81 81 88   Resp: 25 25 (!) 27 24   Temp:   36.4 °C (97.5 °F)    TempSrc:   Temporal    SpO2: 95% 97% 96%    Weight:       Height:         Intake/Output last 3 Shifts:  I/O last 3 completed shifts:  In: 4098.2 (52.2 mL/kg) [I.V.:1089.8 (13.9 mL/kg); IV Piggyback:3008.3]  Out: 1168 (14.9 mL/kg) [Urine:1098 (0.4 mL/kg/hr); Drains:50; Blood:20]  Weight: 78.5 kg     Labs:   Results from last 7 days   Lab Units 05/24/25  0317 05/23/25  1150 05/23/25  0558   SODIUM mmol/L 132* 134* 137   POTASSIUM mmol/L 5.3 4.5 4.2   CHLORIDE mmol/L 100 102 105   CO2 mmol/L 21 21 24   BUN mg/dL 34* 25* 24*   CREATININE mg/dL 2.30* 1.43* 1.35*   GLUCOSE mg/dL 151* 102*  108*   CALCIUM mg/dL 7.9* 9.0 8.6     Results from last 7 days   Lab Units 05/24/25  0317   WBC AUTO x10*3/uL 5.4   HEMOGLOBIN g/dL 9.3*   HEMATOCRIT % 30.0*   PLATELETS AUTO x10*3/uL 223     Results from last 7 days   Lab Units 05/23/25  2320   POCT PH, ARTERIAL pH 7.36*   POCT PCO2, ARTERIAL mm Hg 40   POCT PO2, ARTERIAL mm Hg 58*   POCT HCO3 CALCULATED, ARTERIAL mmol/L 22.6   POCT BASE EXCESS, ARTERIAL mmol/L -2.6*     CT angio chest for pulmonary embolism   Final Result   Addendum (preliminary) 1 of 1   Interpreted By:  Barber Graham,    ADDENDUM:   Please note addendum to upper abdominal findings: There is   wedge-shaped hypoattenuation within the posteromedial spleen   concerning for infarct. Barber Graham discussed the   significance and urgency of this critical finding by telephone with   BRI JOHNSON on 5/23/2025 at 10:33 pm. (**-RCF-**) Findings:  See   findings.        Signed by: Barber Graham 5/24/2025 12:16 AM        -------- ORIGINAL REPORT --------   Dictation workstation:   [id number]      Final   1. Postsurgical change related to recent fundoplication, with   extraluminal contrast and large volume extraluminal fluid tracking   along the stomach and inferior left hepatic lobe compatible with   perforation. The perforation appears to be at the left anterior   aspect of the plicated stomach as described above. Small volume   ascites. Scattered pneumoperitoneum.   2. No pulmonary embolism.   3. Small bilateral pleural effusions and dependent   atelectasis/consolidation. A component of pneumonia is not excluded.   4. Indeterminate hypodense focus within the inferior right hepatic   lobe measuring 1 cm statistically likely to reflect a cyst or   hemangioma. Follow-up ultrasound or MRI may be considered.   5. Several hypodense left thyroid nodules measuring up to 1.5 cm.   Follow-up thyroid ultrasound is recommended.             MACRO:   Barber Graham discussed the significance  and urgency of this   critical finding by telephone with  BRI JOHNSON on 5/23/2025 at 10:33   pm.  (**-RCF-**) Findings:  See findings.        Signed by: Barber Graham 5/23/2025 11:02 PM   Dictation workstation:   [id number]      CT head wo IV contrast   Final Result   1. No acute intracranial hemorrhage or mass effect.        Signed by: Barber Graham 5/23/2025 11:05 PM   Dictation workstation:   [id number]      FL upper GI w KUB   Final Result   1. Status post hiatal hernia repair with fundoplication. Unremarkable   limited post bariatric examination, without evidence of obstruction   or leak.        Signed by: Jeff Sanchez 5/22/2025 8:56 AM   Dictation workstation:   [id number]        Transthoracic echo (TTE) complete 04/25/2025  CONCLUSIONS:  1. Left ventricular ejection fraction is normal, by visual estimate at 65%.  2. Spectral Doppler shows a Grade I (impaired relaxation pattern) of left ventricular diastolic filling with normal left atrial filling pressure.  3. There is normal right ventricular global systolic function.

## 2025-05-24 NOTE — NURSING NOTE
See vitals, assessment.  Abd soft, tender, active bowel sounds, confused as to time,  at bedside.  Apical 134.  Dr. Barfield notified, and ordered ct chest.  Dr. Hutton in to see pt.  See orders.  Premed with benadryl/solumedrol(see emar)  EKG done  down to ct.  2245  dr. Hutton in to speak with patient and  re  surgery.  2300  zosyn given

## 2025-05-24 NOTE — ANESTHESIA PROCEDURE NOTES
Arterial Line:    Date/Time: 5/24/2025 12:10 AM    Staffing  Performed: attending   Authorized by: Eron Britt MD    Performed by: Eron Britt MD    An arterial line was placed. Procedure performed using surface landmarks.in the OR for the following indication(s): continuous blood pressure monitoring and blood sampling needed.    A 20 gauge (size), 12 cm (length), Arrow (type) catheter was placed into the Left brachial artery, secured by Tegaderm,   Seldinger technique not used.  Events:  patient tolerated procedure well with no complications.

## 2025-05-24 NOTE — ANESTHESIA POSTPROCEDURE EVALUATION
"Patient: Beatriz Raymond \"Ginny\"    Procedure Summary       Date: 05/23/25 Room / Location: [facility] 03 / [facility]    Anesthesia Start: 2353 Anesthesia Stop:     Procedure: EXPLORATORY LAPAROSCOPY, SEGMENTAL RESECTION OF STOMACH; EGD; TAP BLOCK (Abdomen) Diagnosis:       Perforated viscus      (Perforated viscus [R19.8])    Surgeons: Alexander Barfield MD Responsible Provider: Eron Britt MD    Anesthesia Type: general ASA Status: 3 - Emergent            Anesthesia Type: general    Vitals Value Taken Time   /7.3 05/24/25 01:54   Temp 36.6 05/24/25 01:54   Pulse 94 05/24/25 01:54   Resp 18 05/24/25 01:54   SpO2 99% 05/24/25 01:54       Anesthesia Post Evaluation    Patient location during evaluation: PACU  Patient participation: waiting for patient participation  Level of consciousness: sleepy but conscious  Pain management: satisfactory to patient  Airway patency: patent  Cardiovascular status: acceptable  Respiratory status: acceptable  Hydration status: acceptable  Postoperative Nausea and Vomiting: none        There were no known notable events for this encounter.    "

## 2025-05-25 LAB
ALBUMIN SERPL BCP-MCNC: 2.7 G/DL (ref 3.4–5)
ALP SERPL-CCNC: 58 U/L (ref 33–136)
ALT SERPL W P-5'-P-CCNC: 14 U/L (ref 7–45)
ANION GAP SERPL CALC-SCNC: 16 MMOL/L (ref 10–20)
AST SERPL W P-5'-P-CCNC: 10 U/L (ref 9–39)
BILIRUB SERPL-MCNC: 0.4 MG/DL (ref 0–1.2)
BUN SERPL-MCNC: 44 MG/DL (ref 6–23)
CALCIUM SERPL-MCNC: 8.3 MG/DL (ref 8.6–10.3)
CHLORIDE SERPL-SCNC: 101 MMOL/L (ref 98–107)
CO2 SERPL-SCNC: 24 MMOL/L (ref 21–32)
CREAT SERPL-MCNC: 2.71 MG/DL (ref 0.5–1.05)
CREAT UR-MCNC: 63.7 MG/DL (ref 20–320)
CREAT UR-MCNC: 63.7 MG/DL (ref 20–320)
EGFRCR SERPLBLD CKD-EPI 2021: 17 ML/MIN/1.73M*2
ERYTHROCYTE [DISTWIDTH] IN BLOOD BY AUTOMATED COUNT: 13.6 % (ref 11.5–14.5)
GLUCOSE SERPL-MCNC: 90 MG/DL (ref 74–99)
HCT VFR BLD AUTO: 27.8 % (ref 36–46)
HGB BLD-MCNC: 8.4 G/DL (ref 12–16)
MAGNESIUM SERPL-MCNC: 2.31 MG/DL (ref 1.6–2.4)
MCH RBC QN AUTO: 29.9 PG (ref 26–34)
MCHC RBC AUTO-ENTMCNC: 30.2 G/DL (ref 32–36)
MCV RBC AUTO: 99 FL (ref 80–100)
MICROALBUMIN UR-MCNC: 194.9 MG/L
MICROALBUMIN/CREAT UR: 306 UG/MG CREAT
NRBC BLD-RTO: 0 /100 WBCS (ref 0–0)
PHOSPHATE SERPL-MCNC: 5.2 MG/DL (ref 2.5–4.9)
PLATELET # BLD AUTO: 238 X10*3/UL (ref 150–450)
POTASSIUM SERPL-SCNC: 4.9 MMOL/L (ref 3.5–5.3)
PROT SERPL-MCNC: 5.4 G/DL (ref 6.4–8.2)
RBC # BLD AUTO: 2.81 X10*6/UL (ref 4–5.2)
SODIUM SERPL-SCNC: 136 MMOL/L (ref 136–145)
SODIUM UR-SCNC: 45 MMOL/L
SODIUM/CREAT UR-RTO: 71 MMOL/G CREAT
WBC # BLD AUTO: 8.8 X10*3/UL (ref 4.4–11.3)

## 2025-05-25 PROCEDURE — 85027 COMPLETE CBC AUTOMATED: CPT | Performed by: SURGERY

## 2025-05-25 PROCEDURE — 82043 UR ALBUMIN QUANTITATIVE: CPT | Performed by: INTERNAL MEDICINE

## 2025-05-25 PROCEDURE — 2500000005 HC RX 250 GENERAL PHARMACY W/O HCPCS: Performed by: SURGERY

## 2025-05-25 PROCEDURE — 87493 C DIFF AMPLIFIED PROBE: CPT | Mod: PARLAB | Performed by: SURGERY

## 2025-05-25 PROCEDURE — 84100 ASSAY OF PHOSPHORUS: CPT | Performed by: SURGERY

## 2025-05-25 PROCEDURE — 82570 ASSAY OF URINE CREATININE: CPT | Performed by: INTERNAL MEDICINE

## 2025-05-25 PROCEDURE — 80053 COMPREHEN METABOLIC PANEL: CPT | Performed by: SURGERY

## 2025-05-25 PROCEDURE — 1100000001 HC PRIVATE ROOM DAILY

## 2025-05-25 PROCEDURE — 83735 ASSAY OF MAGNESIUM: CPT | Performed by: SURGERY

## 2025-05-25 PROCEDURE — 2500000004 HC RX 250 GENERAL PHARMACY W/ HCPCS (ALT 636 FOR OP/ED): Mod: JZ | Performed by: SURGERY

## 2025-05-25 PROCEDURE — 36415 COLL VENOUS BLD VENIPUNCTURE: CPT | Performed by: SURGERY

## 2025-05-25 RX ORDER — SODIUM CHLORIDE 9 MG/ML
100 INJECTION, SOLUTION INTRAVENOUS CONTINUOUS
Status: DISCONTINUED | OUTPATIENT
Start: 2025-05-25 | End: 2025-05-26

## 2025-05-25 RX ADMIN — PIPERACILLIN SODIUM AND TAZOBACTAM SODIUM 3.38 G: 3; .375 INJECTION, SOLUTION INTRAVENOUS at 06:55

## 2025-05-25 RX ADMIN — PIPERACILLIN SODIUM AND TAZOBACTAM SODIUM 3.38 G: 3; .375 INJECTION, SOLUTION INTRAVENOUS at 14:40

## 2025-05-25 RX ADMIN — HEPARIN SODIUM 5000 UNITS: 5000 INJECTION, SOLUTION INTRAVENOUS; SUBCUTANEOUS at 06:32

## 2025-05-25 RX ADMIN — PIPERACILLIN SODIUM AND TAZOBACTAM SODIUM 3.38 G: 3; .375 INJECTION, SOLUTION INTRAVENOUS at 23:20

## 2025-05-25 RX ADMIN — ACETAMINOPHEN 1000 MG: 10 INJECTION INTRAVENOUS at 17:33

## 2025-05-25 RX ADMIN — HEPARIN SODIUM 5000 UNITS: 5000 INJECTION, SOLUTION INTRAVENOUS; SUBCUTANEOUS at 23:20

## 2025-05-25 RX ADMIN — Medication 2 L/MIN: at 20:00

## 2025-05-25 RX ADMIN — ACETAMINOPHEN 1000 MG: 10 INJECTION INTRAVENOUS at 06:32

## 2025-05-25 RX ADMIN — FLUCONAZOLE 200 MG: 2 INJECTION, SOLUTION INTRAVENOUS at 20:26

## 2025-05-25 RX ADMIN — SODIUM CHLORIDE 100 ML/HR: 0.9 INJECTION, SOLUTION INTRAVENOUS at 12:19

## 2025-05-25 RX ADMIN — Medication 2 L/MIN: at 10:12

## 2025-05-25 RX ADMIN — ACETAMINOPHEN 1000 MG: 10 INJECTION INTRAVENOUS at 12:19

## 2025-05-25 RX ADMIN — PANTOPRAZOLE SODIUM 40 MG: 40 INJECTION, POWDER, FOR SOLUTION INTRAVENOUS at 06:32

## 2025-05-25 RX ADMIN — HEPARIN SODIUM 5000 UNITS: 5000 INJECTION, SOLUTION INTRAVENOUS; SUBCUTANEOUS at 14:40

## 2025-05-25 RX ADMIN — SODIUM CHLORIDE 100 ML/HR: 0.9 INJECTION, SOLUTION INTRAVENOUS at 23:24

## 2025-05-25 ASSESSMENT — PAIN - FUNCTIONAL ASSESSMENT: PAIN_FUNCTIONAL_ASSESSMENT: 0-10

## 2025-05-25 ASSESSMENT — PAIN SCALES - GENERAL
PAINLEVEL_OUTOF10: 0 - NO PAIN
PAINLEVEL_OUTOF10: 3

## 2025-05-25 NOTE — PROGRESS NOTES
"Beatriz Raymond \"Slava" is a 79 y.o. female on day 2 of admission presenting with Hiatal hernia.  Status post repair on last Wednesday, taken back to the OR on Friday evening for perforation of the wrap.  Abdominal washout was performed wrap was repaired and abdominal drain was placed, she has been doing well after the surgery, creatinine is rising likely secondary to nephrotoxic contrast.    Subjective   Patient subjectively feels better than yesterday  Denies any significant abdominal pain  NG tube is hurting in the throat         Objective     Patient is awake and alert,  Abdomen is soft, nondistended, appropriately tender  Drain output is serous  No peripheral edema  Jenkins catheter in place  Incisions are healing well    Last Recorded Vitals  Blood pressure 145/68, pulse 78, temperature 36.6 °C (97.9 °F), temperature source Temporal, resp. rate 16, height 1.626 m (5' 4.02\"), weight 78.5 kg (173 lb 1 oz), SpO2 94%.  Intake/Output last 3 Shifts:  I/O last 3 completed shifts:  In: 3810.7 (48.5 mL/kg) [I.V.:1310.7 (16.7 mL/kg); IV Piggyback:2500]  Out: 1808 (23 mL/kg) [Urine:1498 (0.5 mL/kg/hr); Emesis/NG output:100; Drains:190; Blood:20]  Weight: 78.5 kg     Relevant Results  Results for orders placed or performed during the hospital encounter of 05/21/25 (from the past 24 hours)   CBC   Result Value Ref Range    WBC 8.8 4.4 - 11.3 x10*3/uL    nRBC 0.0 0.0 - 0.0 /100 WBCs    RBC 2.81 (L) 4.00 - 5.20 x10*6/uL    Hemoglobin 8.4 (L) 12.0 - 16.0 g/dL    Hematocrit 27.8 (L) 36.0 - 46.0 %    MCV 99 80 - 100 fL    MCH 29.9 26.0 - 34.0 pg    MCHC 30.2 (L) 32.0 - 36.0 g/dL    RDW 13.6 11.5 - 14.5 %    Platelets 238 150 - 450 x10*3/uL   Comprehensive metabolic panel   Result Value Ref Range    Glucose 90 74 - 99 mg/dL    Sodium 136 136 - 145 mmol/L    Potassium 4.9 3.5 - 5.3 mmol/L    Chloride 101 98 - 107 mmol/L    Bicarbonate 24 21 - 32 mmol/L    Anion Gap 16 10 - 20 mmol/L    Urea Nitrogen 44 (H) 6 - 23 mg/dL    Creatinine " 2.71 (H) 0.50 - 1.05 mg/dL    eGFR 17 (L) >60 mL/min/1.73m*2    Calcium 8.3 (L) 8.6 - 10.3 mg/dL    Albumin 2.7 (L) 3.4 - 5.0 g/dL    Alkaline Phosphatase 58 33 - 136 U/L    Total Protein 5.4 (L) 6.4 - 8.2 g/dL    AST 10 9 - 39 U/L    Bilirubin, Total 0.4 0.0 - 1.2 mg/dL    ALT 14 7 - 45 U/L   Magnesium   Result Value Ref Range    Magnesium 2.31 1.60 - 2.40 mg/dL   Phosphorus   Result Value Ref Range    Phosphorus 5.2 (H) 2.5 - 4.9 mg/dL        Scheduled medications  Scheduled Medications[1]  Continuous medications  Continuous Medications[2]  PRN medications  PRN Medications[3]               This patient has a urinary catheter   Reason for the urinary catheter remaining today? urinary retention/bladder outlet obstruction, acute or chronic               Assessment & Plan  Hiatal hernia    Perforated viscus    Sepsis without acute organ dysfunction (Multi)    Acute kidney injury    Peritonitis    Chronic bilateral low back pain    Will continue n.p.o., NG tube for now, okay to have ice chips or sips of water  Cepacol spray for throat  Upper GI study tomorrow  Will continue antibiotics for now  Follow-up renal recommendations, keep Jenkins in  Follow renal studies  Will continue DVT, GI prophylaxis, incentive spirometry  Encouraged the patient to ambulate        Alexander Barfield MD         [1] acetaminophen, 1,000 mg, intravenous, q6h  [Held by provider] amLODIPine, 5 mg, oral, Daily  fluconazole, 200 mg, intravenous, q24h  heparin (porcine), 5,000 Units, subcutaneous, q8h  lidocaine, 1 patch, transdermal, Daily  metoprolol, 5 mg, intravenous, Every other day  [Held by provider] oxyBUTYnin, 5 mg, oral, TID  oxygen, , inhalation, Continuous - Inhalation  pantoprazole, 40 mg, oral, Daily before breakfast   Or  pantoprazole, 40 mg, intravenous, Daily before breakfast  piperacillin-tazobactam, 3.375 g, intravenous, q8h  [Held by provider] sulfaSALAzine, 500 mg, oral, 4x daily  [2] sodium chloride 0.9%, 100 mL/hr  [3] PRN  medications: benzocaine, bisacodyl, HYDROmorphone, HYDROmorphone, HYDROmorphone, ondansetron **OR** ondansetron, simethicone

## 2025-05-25 NOTE — NURSING NOTE
Kinza Hull RN Note   Emergency Response Type:Patient Assistance    Date of Kinza Hull Call: 05/24/25  Time of Code White Arrival: 2120  Event Location 204/204-A    Patient Name: Beatriz Raymond  MRN: [id number]  Code Status: Full code    EVENT SUMMARY-Rounded on patient d/t recent bring-back surgery and post-ICU transfer. Patient appears in good spirits resting in bed at time of round with no acute signs of distress. While rounding patient reports difficulty handling mucous d/t sore throat leading to gagging and expresses desire to get OOB to chair as she hadn't mobilized since surgery. I checked in with bedside RN who noted patient has Cepacol throat lozenges and that patient is safe for transfer but noted heavy two-person assist needed. I encouraged primary RN to reach out to provider and ensure patient is okay to have lozenges as she is POD3 terrance fundoplication. Patient was assisted to edge of bed and then transferred to chair without difficulties by myself and PCT support. Patient reminded to not look at the ground while standing and found it to be extremely helpful. Patient in safe position in chair, connected to oxygen, wheels locked, NG connected to drainage upon my leaving.      Primary Care Provider: Venkat Gill  Attending Provider:MD Kinza Scott will continue to be available if there are any further concerns or changes in condition. Bedside team encouraged to call another Rapid Response or notify Provider/Kinza Hull RN as appropriate if further assistance is needed.

## 2025-05-25 NOTE — CARE PLAN
Problem: Fall/Injury  Goal: Not fall by end of shift  Outcome: Progressing   The patient's goals for the shift include Pain control    The clinical goals for the shift include pt to remain free from falls until end of shift

## 2025-05-25 NOTE — CARE PLAN
The patient's goals for the shift include Pain control    The clinical goals for the shift include pt to remain free from falls until end of shift

## 2025-05-25 NOTE — NURSING NOTE
Transferred to room 202 via bed with oxygen 2 liters/nc by PCA. Accompanied by patient's  with belongings.

## 2025-05-25 NOTE — CONSULTS
"Reason For Consult  BRUCE    History Of Present Illness  Beatriz Raymond \"Slava" is a 79 y.o. female presenting for elective surgery.  Patient is initially admitted for repair of a hiatal hernia and Nissen fundoplication.  This was done on the 21st and unremarkable.  Patient was doing well postoperatively but decompensated.  Imaging revealed extravasation of gastric contents.  On the 23rd she was taken back to the operating room.  She had partial gastrectomy.  Cleaning of abdominal contents.  Afterwards she has been relatively stable.  NG tube has been in place.  She has been recovering however it was noted she had an increase in creatinine.    Regards her baseline renal function she has a baseline creatinine 1.10.  She did have many years of Jasmine 2 inhibitor use.  Does know that she has underlying mild renal disease.  She has had hypertension but this has been controlled.  No uncontrolled diabetes.  No history of nephrolithiasis.     Past Medical History  She has a past medical history of Anemia, Arthritis, Cataract, Chronic back pain, Colon polyp (2020), Fibroid, GERD (gastroesophageal reflux disease), Hearing aid worn, Hiatal hernia, HL (hearing loss), HLD (hyperlipidemia), Hypertension, PONV (postoperative nausea and vomiting) (1975), RA (rheumatoid arthritis), Sarcoidosis, Sarcoma (Multi), Sarcoma of right thigh (Multi), Thyroid nodule (2014), and Vision loss.    Surgical History  She has a past surgical history that includes Cholecystectomy (6/8/20); Hysterectomy (7/6/10); Esophagogastroduodenoscopy (4/8/16); Colonoscopy; Upper gastrointestinal endoscopy; Cataract extraction; Hip Arthroplasty; Knee arthroscopy w/ meniscal repair (Left); and Leg Surgery (Right).     Social History  She reports that she has never smoked. She has never used smokeless tobacco. She reports that she does not drink alcohol and does not use drugs.    Family History  Family History[1]     Allergies  Codeine, Codeine sulfate, Corn containing " products, Iodinated contrast media, and Iodine    Review of Systems    She feels relatively well.  NG tube is in place.  She has no nausea or vomiting.  No significant abdominal discomfort.  No fevers or chills.     Physical Exam    Patient is in no distress.  Speaking full sentences.  NG tube is in the left naris.  Lungs are clear anteriorly.  Is no cardiac rub.  Abdomen soft rebound or guarding.  No peripheral edema.  Urinary catheter is in place.         I&O 24HR    Intake/Output Summary (Last 24 hours) at 5/25/2025 1020  Last data filed at 5/25/2025 0822  Gross per 24 hour   Intake 990 ml   Output 1292 ml   Net -302 ml       Vitals 24HR  Heart Rate:  [70-90]   Temp:  [36.3 °C (97.3 °F)-36.9 °C (98.4 °F)]   Resp:  [16-31]   BP: (130-161)/(60-71)   SpO2:  [84 %-99 %]         Relevant Results    Patient has a BUN of 44, creatinine of 2.71 and a potassium of 4.9 with a bicarb of 24.     Assessment & Plan  Hiatal hernia    Sepsis without acute organ dysfunction (Multi)    Acute kidney injury    Peritonitis    Chronic bilateral low back pain    Perforated viscus    Patient with underlying stage G3a/A2 chronic kidney disease presumably on the basis of chronic Jasmine 2 inhibitor use.    His creatinine on admission was 1.10 however it has steadily increased.  Patient appears to have been hemodynamically stable throughout her initial surgery.  However she did receive contrast on the 23rd.  Since that time creatinine has continued to rise.  She has been non-oliguric.    She is not on any nephrotoxins.    Does appear to be hemodynamically stable.    Clinically this looks as if this may be secondary to contrast nephropathy.  This is a diagnosis of exclusion.    Will check a spot urine sodium creatinine assess renal perfusion.  Quantify proteinuria.    Normal saline to volume expand.    Will hold on renal imaging at this time.    Correct electrolytes are reasonable.    Acid-base status is reasonable.      Johann Paez MD          [1]   Family History  Problem Relation Name Age of Onset    Arthritis Mother Ce Connellyarcadio     Hypertension Mother Ce Her     Miscarriages / Stillbirths Mother Ce Hattie     Cancer Father Danilo Connellyarcadio     Hearing loss Father Danilo Her     Hyperlipidemia Father Danilo Her     Hearing loss Brother Poncho Her     Breast cancer Paternal Grandmother Renetta her     Colon cancer Neg Hx      Celiac disease Neg Hx      Colon polyps Neg Hx      Irritable bowel syndrome Neg Hx      Liver disease Neg Hx      Blood clot Neg Hx

## 2025-05-26 ENCOUNTER — APPOINTMENT (OUTPATIENT)
Dept: RADIOLOGY | Facility: HOSPITAL | Age: 80
End: 2025-05-26
Payer: MEDICARE

## 2025-05-26 VITALS
WEIGHT: 173.06 LBS | DIASTOLIC BLOOD PRESSURE: 78 MMHG | TEMPERATURE: 97.2 F | HEIGHT: 64 IN | RESPIRATION RATE: 16 BRPM | HEART RATE: 87 BPM | OXYGEN SATURATION: 93 % | SYSTOLIC BLOOD PRESSURE: 166 MMHG | BODY MASS INDEX: 29.55 KG/M2

## 2025-05-26 LAB
ALBUMIN SERPL BCP-MCNC: 2.8 G/DL (ref 3.4–5)
ALBUMIN SERPL BCP-MCNC: 2.8 G/DL (ref 3.4–5)
ALP SERPL-CCNC: 119 U/L (ref 33–136)
ALT SERPL W P-5'-P-CCNC: 14 U/L (ref 7–45)
ANION GAP SERPL CALC-SCNC: 18 MMOL/L (ref 10–20)
ANION GAP SERPL CALC-SCNC: 18 MMOL/L (ref 10–20)
AST SERPL W P-5'-P-CCNC: 12 U/L (ref 9–39)
BILIRUB SERPL-MCNC: 0.5 MG/DL (ref 0–1.2)
BUN SERPL-MCNC: 37 MG/DL (ref 6–23)
BUN SERPL-MCNC: 37 MG/DL (ref 6–23)
C DIF TOX TCDA+TCDB STL QL NAA+PROBE: NOT DETECTED
CALCIUM SERPL-MCNC: 8.6 MG/DL (ref 8.6–10.3)
CALCIUM SERPL-MCNC: 8.6 MG/DL (ref 8.6–10.3)
CHLORIDE SERPL-SCNC: 105 MMOL/L (ref 98–107)
CHLORIDE SERPL-SCNC: 105 MMOL/L (ref 98–107)
CO2 SERPL-SCNC: 20 MMOL/L (ref 21–32)
CO2 SERPL-SCNC: 20 MMOL/L (ref 21–32)
CREAT SERPL-MCNC: 2.02 MG/DL (ref 0.5–1.05)
CREAT SERPL-MCNC: 2.02 MG/DL (ref 0.5–1.05)
EGFRCR SERPLBLD CKD-EPI 2021: 25 ML/MIN/1.73M*2
EGFRCR SERPLBLD CKD-EPI 2021: 25 ML/MIN/1.73M*2
ERYTHROCYTE [DISTWIDTH] IN BLOOD BY AUTOMATED COUNT: 14 % (ref 11.5–14.5)
GLUCOSE BLD MANUAL STRIP-MCNC: 125 MG/DL (ref 74–99)
GLUCOSE BLD MANUAL STRIP-MCNC: 52 MG/DL (ref 74–99)
GLUCOSE BLD MANUAL STRIP-MCNC: 68 MG/DL (ref 74–99)
GLUCOSE BLD MANUAL STRIP-MCNC: 79 MG/DL (ref 74–99)
GLUCOSE BLD MANUAL STRIP-MCNC: 99 MG/DL (ref 74–99)
GLUCOSE SERPL-MCNC: 52 MG/DL (ref 74–99)
GLUCOSE SERPL-MCNC: 52 MG/DL (ref 74–99)
HCT VFR BLD AUTO: 26.5 % (ref 36–46)
HGB BLD-MCNC: 7.9 G/DL (ref 12–16)
MAGNESIUM SERPL-MCNC: 2.22 MG/DL (ref 1.6–2.4)
MCH RBC QN AUTO: 29.9 PG (ref 26–34)
MCHC RBC AUTO-ENTMCNC: 29.8 G/DL (ref 32–36)
MCV RBC AUTO: 100 FL (ref 80–100)
NRBC BLD-RTO: 0 /100 WBCS (ref 0–0)
PHOSPHATE SERPL-MCNC: 4 MG/DL (ref 2.5–4.9)
PHOSPHATE SERPL-MCNC: 4 MG/DL (ref 2.5–4.9)
PLATELET # BLD AUTO: 269 X10*3/UL (ref 150–450)
POTASSIUM SERPL-SCNC: 4 MMOL/L (ref 3.5–5.3)
POTASSIUM SERPL-SCNC: 4 MMOL/L (ref 3.5–5.3)
PROT SERPL-MCNC: 5.6 G/DL (ref 6.4–8.2)
RBC # BLD AUTO: 2.64 X10*6/UL (ref 4–5.2)
SODIUM SERPL-SCNC: 139 MMOL/L (ref 136–145)
SODIUM SERPL-SCNC: 139 MMOL/L (ref 136–145)
WBC # BLD AUTO: 11.6 X10*3/UL (ref 4.4–11.3)

## 2025-05-26 PROCEDURE — 80069 RENAL FUNCTION PANEL: CPT | Mod: CCI | Performed by: INTERNAL MEDICINE

## 2025-05-26 PROCEDURE — 36415 COLL VENOUS BLD VENIPUNCTURE: CPT | Performed by: SURGERY

## 2025-05-26 PROCEDURE — 74176 CT ABD & PELVIS W/O CONTRAST: CPT

## 2025-05-26 PROCEDURE — 83735 ASSAY OF MAGNESIUM: CPT | Performed by: SURGERY

## 2025-05-26 PROCEDURE — 2500000005 HC RX 250 GENERAL PHARMACY W/O HCPCS: Performed by: SURGERY

## 2025-05-26 PROCEDURE — 99024 POSTOP FOLLOW-UP VISIT: CPT | Performed by: SURGERY

## 2025-05-26 PROCEDURE — 85027 COMPLETE CBC AUTOMATED: CPT | Performed by: SURGERY

## 2025-05-26 PROCEDURE — 1100000001 HC PRIVATE ROOM DAILY

## 2025-05-26 PROCEDURE — 2500000004 HC RX 250 GENERAL PHARMACY W/ HCPCS (ALT 636 FOR OP/ED): Mod: JZ | Performed by: SURGERY

## 2025-05-26 PROCEDURE — 82947 ASSAY GLUCOSE BLOOD QUANT: CPT

## 2025-05-26 PROCEDURE — 80053 COMPREHEN METABOLIC PANEL: CPT | Performed by: SURGERY

## 2025-05-26 PROCEDURE — 74176 CT ABD & PELVIS W/O CONTRAST: CPT | Mod: FOREIGN READ | Performed by: RADIOLOGY

## 2025-05-26 PROCEDURE — 2500000001 HC RX 250 WO HCPCS SELF ADMINISTERED DRUGS (ALT 637 FOR MEDICARE OP): Performed by: SURGERY

## 2025-05-26 PROCEDURE — 2550000001 HC RX 255 CONTRASTS: Performed by: SURGERY

## 2025-05-26 RX ORDER — DILTIAZEM HYDROCHLORIDE 5 MG/ML
5 INJECTION INTRAVENOUS ONCE
Status: DISCONTINUED | OUTPATIENT
Start: 2025-05-26 | End: 2025-05-26

## 2025-05-26 RX ORDER — DEXTROSE MONOHYDRATE AND SODIUM CHLORIDE 5; .45 G/100ML; G/100ML
100 INJECTION, SOLUTION INTRAVENOUS CONTINUOUS
Status: ACTIVE | OUTPATIENT
Start: 2025-05-26 | End: 2025-05-27

## 2025-05-26 RX ORDER — DEXTROSE 50 % IN WATER (D50W) INTRAVENOUS SYRINGE
25
Status: DISCONTINUED | OUTPATIENT
Start: 2025-05-26 | End: 2025-06-06 | Stop reason: HOSPADM

## 2025-05-26 RX ORDER — HYDRALAZINE HYDROCHLORIDE 20 MG/ML
10 INJECTION INTRAMUSCULAR; INTRAVENOUS EVERY 4 HOURS PRN
Status: DISCONTINUED | OUTPATIENT
Start: 2025-05-26 | End: 2025-05-31

## 2025-05-26 RX ORDER — METOPROLOL TARTRATE 1 MG/ML
5 INJECTION, SOLUTION INTRAVENOUS ONCE
Status: COMPLETED | OUTPATIENT
Start: 2025-05-26 | End: 2025-05-26

## 2025-05-26 RX ORDER — DEXTROSE 50 % IN WATER (D50W) INTRAVENOUS SYRINGE
12.5
Status: DISCONTINUED | OUTPATIENT
Start: 2025-05-26 | End: 2025-06-06 | Stop reason: HOSPADM

## 2025-05-26 RX ORDER — AMLODIPINE BESYLATE 10 MG/1
10 TABLET ORAL DAILY
Status: DISCONTINUED | OUTPATIENT
Start: 2025-05-26 | End: 2025-05-27

## 2025-05-26 RX ORDER — ACETAMINOPHEN 10 MG/ML
1000 INJECTION, SOLUTION INTRAVENOUS EVERY 6 HOURS
Status: COMPLETED | OUTPATIENT
Start: 2025-05-26 | End: 2025-05-27

## 2025-05-26 RX ORDER — DIATRIZOATE MEGLUMINE AND DIATRIZOATE SODIUM 660; 100 MG/ML; MG/ML
60 SOLUTION ORAL; RECTAL ONCE
Status: COMPLETED | OUTPATIENT
Start: 2025-05-26 | End: 2025-05-26

## 2025-05-26 RX ORDER — HYDRALAZINE HYDROCHLORIDE 20 MG/ML
10 INJECTION INTRAMUSCULAR; INTRAVENOUS ONCE
Status: COMPLETED | OUTPATIENT
Start: 2025-05-26 | End: 2025-05-26

## 2025-05-26 RX ADMIN — Medication 2 L/MIN: at 07:40

## 2025-05-26 RX ADMIN — ACETAMINOPHEN 1000 MG: 10 INJECTION INTRAVENOUS at 12:36

## 2025-05-26 RX ADMIN — PIPERACILLIN SODIUM AND TAZOBACTAM SODIUM 3.38 G: 3; .375 INJECTION, SOLUTION INTRAVENOUS at 22:42

## 2025-05-26 RX ADMIN — METOPROLOL TARTRATE 5 MG: 5 INJECTION INTRAVENOUS at 17:32

## 2025-05-26 RX ADMIN — HYDRALAZINE HYDROCHLORIDE 10 MG: 20 INJECTION INTRAMUSCULAR; INTRAVENOUS at 05:16

## 2025-05-26 RX ADMIN — HYDRALAZINE HYDROCHLORIDE 10 MG: 20 INJECTION INTRAMUSCULAR; INTRAVENOUS at 16:18

## 2025-05-26 RX ADMIN — PANTOPRAZOLE SODIUM 40 MG: 40 INJECTION, POWDER, FOR SOLUTION INTRAVENOUS at 05:16

## 2025-05-26 RX ADMIN — Medication 2 L/MIN: at 20:29

## 2025-05-26 RX ADMIN — AMLODIPINE BESYLATE 10 MG: 10 TABLET ORAL at 18:55

## 2025-05-26 RX ADMIN — HEPARIN SODIUM 5000 UNITS: 5000 INJECTION, SOLUTION INTRAVENOUS; SUBCUTANEOUS at 14:54

## 2025-05-26 RX ADMIN — PIPERACILLIN SODIUM AND TAZOBACTAM SODIUM 3.38 G: 3; .375 INJECTION, SOLUTION INTRAVENOUS at 14:54

## 2025-05-26 RX ADMIN — DEXTROSE MONOHYDRATE 12.5 G: 25 INJECTION, SOLUTION INTRAVENOUS at 07:03

## 2025-05-26 RX ADMIN — DEXTROSE MONOHYDRATE 12.5 G: 25 INJECTION, SOLUTION INTRAVENOUS at 17:42

## 2025-05-26 RX ADMIN — ACETAMINOPHEN 1000 MG: 10 INJECTION INTRAVENOUS at 20:25

## 2025-05-26 RX ADMIN — FLUCONAZOLE 200 MG: 2 INJECTION, SOLUTION INTRAVENOUS at 16:59

## 2025-05-26 RX ADMIN — HEPARIN SODIUM 5000 UNITS: 5000 INJECTION, SOLUTION INTRAVENOUS; SUBCUTANEOUS at 05:16

## 2025-05-26 RX ADMIN — DEXTROSE AND SODIUM CHLORIDE 100 ML/HR: 5; .45 INJECTION, SOLUTION INTRAVENOUS at 17:56

## 2025-05-26 RX ADMIN — HEPARIN SODIUM 5000 UNITS: 5000 INJECTION, SOLUTION INTRAVENOUS; SUBCUTANEOUS at 22:42

## 2025-05-26 RX ADMIN — PIPERACILLIN SODIUM AND TAZOBACTAM SODIUM 3.38 G: 3; .375 INJECTION, SOLUTION INTRAVENOUS at 06:39

## 2025-05-26 RX ADMIN — DIATRIZOATE MEGLUMINE AND DIATRIZOATE SODIUM 30 ML: 600; 100 SOLUTION ORAL; RECTAL at 10:19

## 2025-05-26 ASSESSMENT — PAIN SCALES - GENERAL
PAINLEVEL_OUTOF10: 0 - NO PAIN

## 2025-05-26 ASSESSMENT — PAIN - FUNCTIONAL ASSESSMENT: PAIN_FUNCTIONAL_ASSESSMENT: 0-10

## 2025-05-26 NOTE — PROGRESS NOTES
"Beatriz Raymond \"Slava" is a 79 y.o. female on day 3 of admission presenting with Hiatal hernia.    Subjective   Patient continues to have multiple episodes of diarrhea. Hypertensive overnight to the 170s-200s, responsive to IV hydralazine. Thereafter she was tachycardic to the 100s, no fevers. WBC 11 this AM. CTAP obtained this morning, without any obvious leak seen.        Objective     Physical Exam  Physical Exam:   Constitutional: Well developed, awake/alert/oriented x3, no distress, alert and cooperative  Eyes: PERRL, EOMI, clear sclera  Head/Neck: Neck supple, no apparent injury, No JVD, trachea midline  Respiratory/Thorax: good chest expansion, thorax symmetric  Cardiovascular: Regular, rate and rhythm,  2+ equal pulses of the extremities  Gastrointestinal: Nondistended, soft, minimally tender, no rebound tenderness or guarding, no masses palpable, incisions c/d/I, ELENI with serous output  Musculoskeletal: ROM intact, no joint swelling, normal strength  Extremities: normal extremities, no cyanosis edema, contusions or wounds, no clubbing  Neurological: alert and oriented x3, intact senses,  Psychological: Appropriate mood and behavior  Skin: Warm and dry, no lesions, no rashes    Last Recorded Vitals  Blood pressure 123/77, pulse 108, temperature 36 °C (96.8 °F), temperature source Temporal, resp. rate 18, height 1.626 m (5' 4.02\"), weight 78.5 kg (173 lb 1 oz), SpO2 94%.  Intake/Output last 3 Shifts:  I/O last 3 completed shifts:  In: 3168.3 (40.4 mL/kg) [I.V.:2768.3 (35.3 mL/kg); IV Piggyback:400]  Out: 2735 (34.8 mL/kg) [Urine:2475 (0.9 mL/kg/hr); Emesis/NG output:100; Drains:130; Stool:30]  Weight: 78.5 kg     Relevant Results                              Assessment & Plan  Hiatal hernia    Perforated viscus    Sepsis without acute organ dysfunction (Multi)    Acute kidney injury    Peritonitis    Chronic bilateral low back pain    79F s/p laparoscopic HHR with mesh and Nissen fundoplication on 5/21/25, who " developed a leak in the proximal portion of the fundoplication status post diagnostic laparoscopy and partial gastrectomy, and redo fundoplication on 5/20/2025.   - NPO, may have ice chips and sips of water  - NG tube to low continuous wall suction  - Daily labs  - Mechanical and chemical DVT prophylaxis  - Avoid NSAIDs and gabapentin  - Continue on 100 ml/hr of NS, monitor BRUCE  - Antiemetics as needed  - Out of bed to chair, may ambulate if she wishes        I spent 60 minutes in the professional and overall care of this patient.      Eliud Hutton MD

## 2025-05-26 NOTE — CARE PLAN
The patient's goals for the shift include Pain control    The clinical goals for the shift include pt to remain free from falls until end of shift    Problem: Pain  Goal: Takes deep breaths with improved pain control throughout the shift  Outcome: Progressing  Goal: Turns in bed with improved pain control throughout the shift  Outcome: Progressing  Goal: Walks with improved pain control throughout the shift  Outcome: Progressing  Goal: Performs ADL's with improved pain control throughout shift  Outcome: Progressing  Goal: Participates in PT with improved pain control throughout the shift  Outcome: Progressing  Goal: Free from opioid side effects throughout the shift  Outcome: Progressing  Goal: Free from acute confusion related to pain meds throughout the shift  Outcome: Progressing     Problem: Skin  Goal: Decreased wound size/increased tissue granulation at next dressing change  Outcome: Progressing  Goal: Participates in plan/prevention/treatment measures  Outcome: Progressing  Goal: Prevent/manage excess moisture  Outcome: Progressing  Goal: Prevent/minimize sheer/friction injuries  Outcome: Progressing  Goal: Promote/optimize nutrition  Outcome: Progressing  Goal: Promote skin healing  Outcome: Progressing     Problem: Fall/Injury  Goal: Not fall by end of shift  Outcome: Progressing

## 2025-05-26 NOTE — PROGRESS NOTES
"Beatriz Raymond \"Slava" is a 79 y.o. female on day 3 of admission presenting with Hiatal hernia.      Subjective     Patient has been relatively stable.       Objective     Being sent down for CT scan of the abdomen.       Vitals 24HR  Heart Rate:  []   Temp:  [36 °C (96.8 °F)-36.6 °C (97.9 °F)]   Resp:  [16-18]   BP: (123-206)/(70-84)   SpO2:  [93 %-98 %]       Intake/Output last 3 Shifts:    Intake/Output Summary (Last 24 hours) at 5/26/2025 1039  Last data filed at 5/26/2025 0928  Gross per 24 hour   Intake 3168.33 ml   Output 2275 ml   Net 893.33 ml       Physical Exam    Patient has an NG tube in place.  No jugular venous distention.  No carotid bruits.  Lungs are clear to auscultation.  Heart regular and febrile.  Minimal peripheral edema.        Assessment & Plan  Hiatal hernia    Sepsis without acute organ dysfunction (Multi)    Acute kidney injury    Peritonitis    Chronic bilateral low back pain    Perforated viscus    Patient with underlying stage G3a/A2 kidney disease likely secondary to previous toxic 2 inhibitor use.  Baseline creatinine at 1.1.    Now with acute kidney injury.  Most likely this was related to contrast exposure.    Urine output is over 2 L and creatinine is trending down.  Will continue with IV fluids volume expand.    Current electrolytes are acceptable.      Johann Paez MD      "

## 2025-05-27 LAB
ALBUMIN SERPL BCP-MCNC: 2.8 G/DL (ref 3.4–5)
ALP SERPL-CCNC: 203 U/L (ref 33–136)
ALT SERPL W P-5'-P-CCNC: 17 U/L (ref 7–45)
ANION GAP SERPL CALC-SCNC: 13 MMOL/L (ref 10–20)
AST SERPL W P-5'-P-CCNC: 19 U/L (ref 9–39)
ATRIAL RATE: 121 BPM
BASOPHILS # BLD AUTO: 0.02 X10*3/UL (ref 0–0.1)
BASOPHILS NFR BLD AUTO: 0.2 %
BILIRUB SERPL-MCNC: 0.6 MG/DL (ref 0–1.2)
BUN SERPL-MCNC: 22 MG/DL (ref 6–23)
CALCIUM SERPL-MCNC: 8.4 MG/DL (ref 8.6–10.3)
CHLORIDE SERPL-SCNC: 105 MMOL/L (ref 98–107)
CO2 SERPL-SCNC: 24 MMOL/L (ref 21–32)
CREAT SERPL-MCNC: 1.27 MG/DL (ref 0.5–1.05)
EGFRCR SERPLBLD CKD-EPI 2021: 43 ML/MIN/1.73M*2
EOSINOPHIL # BLD AUTO: 0.27 X10*3/UL (ref 0–0.4)
EOSINOPHIL NFR BLD AUTO: 3.2 %
ERYTHROCYTE [DISTWIDTH] IN BLOOD BY AUTOMATED COUNT: 14.4 % (ref 11.5–14.5)
GLUCOSE BLD MANUAL STRIP-MCNC: 101 MG/DL (ref 74–99)
GLUCOSE BLD MANUAL STRIP-MCNC: 121 MG/DL (ref 74–99)
GLUCOSE BLD MANUAL STRIP-MCNC: 131 MG/DL (ref 74–99)
GLUCOSE BLD MANUAL STRIP-MCNC: 142 MG/DL (ref 74–99)
GLUCOSE BLD MANUAL STRIP-MCNC: 161 MG/DL (ref 74–99)
GLUCOSE SERPL-MCNC: 118 MG/DL (ref 74–99)
HCT VFR BLD AUTO: 27.9 % (ref 36–46)
HGB BLD-MCNC: 8.5 G/DL (ref 12–16)
IMM GRANULOCYTES # BLD AUTO: 0.13 X10*3/UL (ref 0–0.5)
IMM GRANULOCYTES NFR BLD AUTO: 1.5 % (ref 0–0.9)
LYMPHOCYTES # BLD AUTO: 0.5 X10*3/UL (ref 0.8–3)
LYMPHOCYTES NFR BLD AUTO: 5.9 %
MAGNESIUM SERPL-MCNC: 1.78 MG/DL (ref 1.6–2.4)
MCH RBC QN AUTO: 29.7 PG (ref 26–34)
MCHC RBC AUTO-ENTMCNC: 30.5 G/DL (ref 32–36)
MCV RBC AUTO: 98 FL (ref 80–100)
MONOCYTES # BLD AUTO: 0.73 X10*3/UL (ref 0.05–0.8)
MONOCYTES NFR BLD AUTO: 8.6 %
NEUTROPHILS # BLD AUTO: 6.79 X10*3/UL (ref 1.6–5.5)
NEUTROPHILS NFR BLD AUTO: 80.6 %
NRBC BLD-RTO: 0.2 /100 WBCS (ref 0–0)
P AXIS: 118 DEGREES
P OFFSET: 194 MS
P ONSET: 147 MS
PHOSPHATE SERPL-MCNC: 2.7 MG/DL (ref 2.5–4.9)
PLATELET # BLD AUTO: 256 X10*3/UL (ref 150–450)
POTASSIUM SERPL-SCNC: 3.2 MMOL/L (ref 3.5–5.3)
PR INTERVAL: 132 MS
PROT SERPL-MCNC: 5.6 G/DL (ref 6.4–8.2)
Q ONSET: 213 MS
QRS COUNT: 20 BEATS
QRS DURATION: 88 MS
QT INTERVAL: 304 MS
QTC CALCULATION(BAZETT): 431 MS
QTC FREDERICIA: 384 MS
R AXIS: 221 DEGREES
RBC # BLD AUTO: 2.86 X10*6/UL (ref 4–5.2)
SODIUM SERPL-SCNC: 139 MMOL/L (ref 136–145)
T AXIS: 134 DEGREES
T OFFSET: 365 MS
VENTRICULAR RATE: 121 BPM
WBC # BLD AUTO: 8.4 X10*3/UL (ref 4.4–11.3)

## 2025-05-27 PROCEDURE — 80053 COMPREHEN METABOLIC PANEL: CPT | Performed by: SURGERY

## 2025-05-27 PROCEDURE — 36415 COLL VENOUS BLD VENIPUNCTURE: CPT | Performed by: SURGERY

## 2025-05-27 PROCEDURE — 99233 SBSQ HOSP IP/OBS HIGH 50: CPT | Performed by: INTERNAL MEDICINE

## 2025-05-27 PROCEDURE — 2500000001 HC RX 250 WO HCPCS SELF ADMINISTERED DRUGS (ALT 637 FOR MEDICARE OP): Performed by: SURGERY

## 2025-05-27 PROCEDURE — 2500000002 HC RX 250 W HCPCS SELF ADMINISTERED DRUGS (ALT 637 FOR MEDICARE OP, ALT 636 FOR OP/ED): Performed by: SURGERY

## 2025-05-27 PROCEDURE — 2500000001 HC RX 250 WO HCPCS SELF ADMINISTERED DRUGS (ALT 637 FOR MEDICARE OP): Performed by: INTERNAL MEDICINE

## 2025-05-27 PROCEDURE — 85025 COMPLETE CBC W/AUTO DIFF WBC: CPT | Performed by: SURGERY

## 2025-05-27 PROCEDURE — 2500000005 HC RX 250 GENERAL PHARMACY W/O HCPCS: Performed by: SURGERY

## 2025-05-27 PROCEDURE — 84100 ASSAY OF PHOSPHORUS: CPT | Performed by: SURGERY

## 2025-05-27 PROCEDURE — 83735 ASSAY OF MAGNESIUM: CPT | Performed by: SURGERY

## 2025-05-27 PROCEDURE — 1100000001 HC PRIVATE ROOM DAILY

## 2025-05-27 PROCEDURE — 82947 ASSAY GLUCOSE BLOOD QUANT: CPT

## 2025-05-27 PROCEDURE — 2500000004 HC RX 250 GENERAL PHARMACY W/ HCPCS (ALT 636 FOR OP/ED): Performed by: SURGERY

## 2025-05-27 RX ORDER — ACETAMINOPHEN 10 MG/ML
1000 INJECTION, SOLUTION INTRAVENOUS EVERY 6 HOURS
Status: COMPLETED | OUTPATIENT
Start: 2025-05-27 | End: 2025-05-28

## 2025-05-27 RX ORDER — HYDRALAZINE HYDROCHLORIDE 20 MG/ML
5 INJECTION INTRAMUSCULAR; INTRAVENOUS EVERY 4 HOURS PRN
Status: DISCONTINUED | OUTPATIENT
Start: 2025-05-27 | End: 2025-05-27

## 2025-05-27 RX ORDER — TRAMADOL HYDROCHLORIDE 50 MG/1
50 TABLET, FILM COATED ORAL EVERY 6 HOURS PRN
Status: DISCONTINUED | OUTPATIENT
Start: 2025-05-27 | End: 2025-06-06 | Stop reason: HOSPADM

## 2025-05-27 RX ORDER — TRAMADOL HYDROCHLORIDE 50 MG/1
50 TABLET, FILM COATED ORAL EVERY 8 HOURS PRN
Status: DISCONTINUED | OUTPATIENT
Start: 2025-05-27 | End: 2025-05-27

## 2025-05-27 RX ORDER — OXYBUTYNIN CHLORIDE 5 MG/1
5 TABLET ORAL 3 TIMES DAILY
Status: DISCONTINUED | OUTPATIENT
Start: 2025-05-27 | End: 2025-06-06 | Stop reason: HOSPADM

## 2025-05-27 RX ORDER — METOPROLOL TARTRATE 25 MG/1
12.5 TABLET, FILM COATED ORAL 2 TIMES DAILY
Status: DISCONTINUED | OUTPATIENT
Start: 2025-05-27 | End: 2025-06-06 | Stop reason: HOSPADM

## 2025-05-27 RX ORDER — POTASSIUM CHLORIDE 14.9 MG/ML
20 INJECTION INTRAVENOUS
Status: DISCONTINUED | OUTPATIENT
Start: 2025-05-27 | End: 2025-05-27

## 2025-05-27 RX ORDER — POTASSIUM CHLORIDE 14.9 MG/ML
20 INJECTION INTRAVENOUS
Status: DISPENSED | OUTPATIENT
Start: 2025-05-27 | End: 2025-05-27

## 2025-05-27 RX ADMIN — HEPARIN SODIUM 5000 UNITS: 5000 INJECTION, SOLUTION INTRAVENOUS; SUBCUTANEOUS at 21:57

## 2025-05-27 RX ADMIN — PIPERACILLIN SODIUM AND TAZOBACTAM SODIUM 3.38 G: 3; .375 INJECTION, SOLUTION INTRAVENOUS at 06:12

## 2025-05-27 RX ADMIN — ACETAMINOPHEN 1000 MG: 10 INJECTION INTRAVENOUS at 02:31

## 2025-05-27 RX ADMIN — FLUCONAZOLE 200 MG: 2 INJECTION, SOLUTION INTRAVENOUS at 17:12

## 2025-05-27 RX ADMIN — ACETAMINOPHEN 1000 MG: 10 INJECTION INTRAVENOUS at 14:52

## 2025-05-27 RX ADMIN — ACETAMINOPHEN 1000 MG: 10 INJECTION INTRAVENOUS at 08:23

## 2025-05-27 RX ADMIN — PIPERACILLIN SODIUM AND TAZOBACTAM SODIUM 3.38 G: 3; .375 INJECTION, SOLUTION INTRAVENOUS at 21:56

## 2025-05-27 RX ADMIN — AMLODIPINE BESYLATE 5 MG: 10 TABLET ORAL at 08:24

## 2025-05-27 RX ADMIN — HEPARIN SODIUM 5000 UNITS: 5000 INJECTION, SOLUTION INTRAVENOUS; SUBCUTANEOUS at 06:12

## 2025-05-27 RX ADMIN — METOPROLOL TARTRATE 12.5 MG: 25 TABLET, FILM COATED ORAL at 10:23

## 2025-05-27 RX ADMIN — ACETAMINOPHEN 1000 MG: 10 INJECTION INTRAVENOUS at 20:57

## 2025-05-27 RX ADMIN — POTASSIUM CHLORIDE 20 MEQ: 14.9 INJECTION, SOLUTION INTRAVENOUS at 08:37

## 2025-05-27 RX ADMIN — TRAMADOL HYDROCHLORIDE 50 MG: 50 TABLET, COATED ORAL at 11:02

## 2025-05-27 RX ADMIN — OXYBUTYNIN CHLORIDE 5 MG: 5 TABLET ORAL at 20:58

## 2025-05-27 RX ADMIN — OXYBUTYNIN CHLORIDE 5 MG: 5 TABLET ORAL at 14:52

## 2025-05-27 RX ADMIN — LIDOCAINE 4% 1 PATCH: 40 PATCH TOPICAL at 08:23

## 2025-05-27 RX ADMIN — HEPARIN SODIUM 5000 UNITS: 5000 INJECTION, SOLUTION INTRAVENOUS; SUBCUTANEOUS at 14:52

## 2025-05-27 RX ADMIN — METOPROLOL TARTRATE 12.5 MG: 25 TABLET, FILM COATED ORAL at 20:58

## 2025-05-27 RX ADMIN — PANTOPRAZOLE SODIUM 40 MG: 40 INJECTION, POWDER, FOR SOLUTION INTRAVENOUS at 06:12

## 2025-05-27 RX ADMIN — HYDRALAZINE HYDROCHLORIDE 10 MG: 20 INJECTION INTRAMUSCULAR; INTRAVENOUS at 07:49

## 2025-05-27 RX ADMIN — PIPERACILLIN SODIUM AND TAZOBACTAM SODIUM 3.38 G: 3; .375 INJECTION, SOLUTION INTRAVENOUS at 14:52

## 2025-05-27 ASSESSMENT — PAIN - FUNCTIONAL ASSESSMENT
PAIN_FUNCTIONAL_ASSESSMENT: 0-10

## 2025-05-27 ASSESSMENT — PAIN SCALES - GENERAL
PAINLEVEL_OUTOF10: 2
PAINLEVEL_OUTOF10: 8
PAINLEVEL_OUTOF10: 2
PAINLEVEL_OUTOF10: 0 - NO PAIN

## 2025-05-27 ASSESSMENT — PAIN DESCRIPTION - DESCRIPTORS: DESCRIPTORS: DISCOMFORT

## 2025-05-27 NOTE — PROGRESS NOTES
"Beatriz Raymond \"Slava" is a 79 y.o. female on day 4 of admission presenting with Hiatal hernia.    Plan: lap Hiatal hernia repair with mesh and Nissen fundoplication on 5/21/25, who developed a leak in the proximal portion of the fundoplication status post diagnostic laparoscopy and partial gastrectomy, and redo fundoplication on 5/23/25. NG in place on suction, has gallo, drain. Nephrology following- BRUCE slowing improving.   Disposition: Home with spouse  Barrier: drain removal, advance diet as tolerated, therapy?, remove gallo  ADOD:  2-5 days       05/27/25 0702   Discharge Planning   Living Arrangements Spouse/significant other   Support Systems Spouse/significant other   Assistance Needed baseline independent, has walker at home if needed.   Home or Post Acute Services None   Expected Discharge Disposition Home  (declined home care)   Does the patient need discharge transport arranged? No   Intensity of Service   Intensity of Service 0-30 min       Cindy Bradford RN      "

## 2025-05-27 NOTE — PROGRESS NOTES
NEPHROLOGY PROGRESS NOTE    REASON FOR CONSULT: BRUCE on CKD stage III and hypertension    SUBJECTIVE:  Patient is on clear liquid diet.  She just had her NG tube taken out today.  She is having a lot more pain in the abdomen today.  Denies any shortness of breath.  She has longstanding history of hypertension.  She also has history of sarcoma for which she had surgery on her right hip thigh area.  She is status post radiation.      OBJECTIVE:    Visit Vitals  BP (!) 183/81 (BP Location: Left arm)   Pulse 85   Temp 36.2 °C (97.2 °F) (Temporal)   Resp 16          Intake/Output Summary (Last 24 hours) at 5/27/2025 0951  Last data filed at 5/27/2025 0720  Gross per 24 hour   Intake 1648.34 ml   Output 1470.5 ml   Net 177.84 ml        General: Awake and Alert, In no distress, Cooperative  HEENT: Oral mucosa moist, EOMI  NECK: Supple  CHEST: No crackles, no wheeze, no tachypnea  CVS: S1,S2 heard, no rubs, RRR  ABD: Soft, tenderness, BS present  EXT: no edema    MEDICATION:    Scheduled medications  Scheduled Medications[1]  Continuous medications  Continuous Medications[2]  PRN medications  PRN Medications[3]     RESULTS:    Lab Results   Component Value Date    WBC 8.4 05/27/2025    HGB 8.5 (L) 05/27/2025    HCT 27.9 (L) 05/27/2025    MCV 98 05/27/2025     05/27/2025        Lab Results   Component Value Date    CREATININE 1.27 (H) 05/27/2025    BUN 22 05/27/2025     05/27/2025    K 3.2 (L) 05/27/2025     05/27/2025    CO2 24 05/27/2025        Radiology Imaging reviewed      ASSESSMENT/PLAN:     1.  BRUCE: Patient's creatinine is back to her baseline with today's creatinine at 1.27.  Peak creatinine was 2.3.  Baseline creatinine is between 1.1-1.3.  Likely hemodynamic effect in the setting of Nissen's fundoplication surgery and subsequent partial gastrectomy    2.  Hypertension: She is on metoprolol amlodipine and losartan at home.  She was just started on the metoprolol 12.5 mg twice daily today.  She is  on the amlodipine 5 mg daily which she did not get yesterday or day before.  Eventually we may be able to resume her losartan but we will see the effect of these 2 medications today.    3.  CKD stage III: Baseline creatinine between 1.1-1.3.  She has longstanding history of hypertension.        Thank You very much for allowing me to participate in the care of this Patient    This document was created using dragon dictation and may contain unintended error    Jono Cole MD   05/27/25              [1] amLODIPine, 5 mg, oral, Daily  fluconazole, 200 mg, intravenous, q24h  heparin (porcine), 5,000 Units, subcutaneous, q8h  lidocaine, 1 patch, transdermal, Daily  metoprolol tartrate, 12.5 mg, oral, BID  [Held by provider] oxyBUTYnin, 5 mg, oral, TID  oxygen, , inhalation, Continuous - Inhalation  pantoprazole, 40 mg, oral, Daily before breakfast   Or  pantoprazole, 40 mg, intravenous, Daily before breakfast  piperacillin-tazobactam, 3.375 g, intravenous, q8h  potassium chloride, 20 mEq, intravenous, q2h  [Held by provider] sulfaSALAzine, 500 mg, oral, 4x daily  [2] dextrose 5%-0.45 % sodium chloride, 100 mL/hr, Last Rate: 100 mL/hr (05/27/25 0329)  [3] PRN medications: benzocaine, bisacodyl, dextrose, dextrose, glucagon, glucagon, hydrALAZINE, HYDROmorphone, HYDROmorphone, HYDROmorphone, ondansetron **OR** ondansetron, simethicone

## 2025-05-27 NOTE — PROGRESS NOTES
"Beatriz Raymond \"Slava" is a 79 y.o. female on day 4 of admission presenting with Hiatal hernia.    Subjective   Persistent hypertension overnight refractory to IV metoprolol and IV hydralazine.  Seen by the medicine team and nephrology, metoprolol twice daily and amlodipine daily ordered.  NG tube removed at bedside this morning, patient tolerating sips of clears.  Does complain of some increased abdominal pain.  Leukocytosis has normalized and BRUCE is improving.       Objective     Physical Exam  Physical Exam:   Constitutional: Well developed, awake/alert/oriented x3, no distress, alert and cooperative  Eyes: PERRL, EOMI, clear sclera  Head/Neck: Neck supple, no apparent injury, No JVD, trachea midline  Respiratory/Thorax: good chest expansion, thorax symmetric  Cardiovascular: Regular, rate and rhythm,  2+ equal pulses of the extremities  Gastrointestinal: Nondistended, soft, minimally tender, no rebound tenderness or guarding, no masses palpable, incisions c/d/I, ELENI with serous output  Musculoskeletal: ROM intact, no joint swelling, normal strength  Extremities: normal extremities, no cyanosis edema, contusions or wounds, no clubbing  Neurological: alert and oriented x3, intact senses,  Psychological: Appropriate mood and behavior  Skin: Warm and dry, no lesions, no rashes    Last Recorded Vitals  Blood pressure (!) 183/81, pulse 85, temperature 36.2 °C (97.2 °F), temperature source Temporal, resp. rate 16, height 1.626 m (5' 4.02\"), weight 78.5 kg (173 lb 1 oz), SpO2 96%.  Intake/Output last 3 Shifts:  I/O last 3 completed shifts:  In: 3333.3 (42.5 mL/kg) [I.V.:2483.3 (31.6 mL/kg); IV Piggyback:850]  Out: 3390 (43.2 mL/kg) [Urine:3250 (1.2 mL/kg/hr); Emesis/NG output:100; Drains:40]  Weight: 78.5 kg     Relevant Results                              Assessment & Plan  Hiatal hernia    Perforated viscus    Sepsis without acute organ dysfunction (Multi)    Acute kidney injury    Peritonitis    Chronic bilateral low " back pain    79F s/p laparoscopic HHR with mesh and Nissen fundoplication on 5/21/25, who developed a leak in the proximal portion of the fundoplication status post diagnostic laparoscopy and partial gastrectomy, and redo fundoplication on 5/20/2025.   - NG tube been removed, she may have sips of water today and we will consider advancing to clear liquid diet based on how she does  - Continue antibiotics, Zosyn and fluconazole  - Daily labs  - Mechanical and chemical DVT prophylaxis  - Avoid NSAIDs and gabapentin  - Appreciate nephrology recommendations  - Appreciate medicine recommendations  - Antiemetics as needed  - Out of bed to chair, may ambulate if she wishes        I spent 60 minutes in the professional and overall care of this patient.      Eliud Hutton MD

## 2025-05-27 NOTE — CARE PLAN
The patient's goals for the shift include Pain control    The clinical goals for the shift include comfort and safety

## 2025-05-27 NOTE — CARE PLAN
Problem: Pain  Goal: Takes deep breaths with improved pain control throughout the shift  Outcome: Progressing  Goal: Turns in bed with improved pain control throughout the shift  Outcome: Progressing  Goal: Walks with improved pain control throughout the shift  Outcome: Progressing  Goal: Performs ADL's with improved pain control throughout shift  Outcome: Progressing  Goal: Participates in PT with improved pain control throughout the shift  Outcome: Progressing  Goal: Free from opioid side effects throughout the shift  Outcome: Progressing  Goal: Free from acute confusion related to pain meds throughout the shift  Outcome: Progressing     Problem: Skin  Goal: Decreased wound size/increased tissue granulation at next dressing change  Outcome: Progressing  Goal: Participates in plan/prevention/treatment measures  Outcome: Progressing  Goal: Prevent/manage excess moisture  Outcome: Progressing  Goal: Prevent/minimize sheer/friction injuries  Outcome: Progressing  Goal: Promote/optimize nutrition  Outcome: Progressing  Goal: Promote skin healing  Outcome: Progressing     Problem: Fall/Injury  Goal: Not fall by end of shift  Outcome: Progressing

## 2025-05-27 NOTE — NURSING NOTE
New Bp was 182/70 HR was 84 Dr Hidalgo notified. He had no new orders. Said that he would review her chart.

## 2025-05-27 NOTE — PROGRESS NOTES
"Beatriz Raymond \"Slava" is a 79 y.o. female on day 4 of admission presenting with Hiatal hernia.      Subjective     Seen resting in bed with nursing and spouse at bedside.  Admits she is having some increased pain today.       Objective     Last Recorded Vitals  BP (!) 183/81 (BP Location: Left arm)   Pulse 85   Temp 36.2 °C (97.2 °F) (Temporal)   Resp 16   Wt 78.5 kg (173 lb 1 oz)   SpO2 96%   Intake/Output last 3 Shifts:    Intake/Output Summary (Last 24 hours) at 5/27/2025 1055  Last data filed at 5/27/2025 0720  Gross per 24 hour   Intake 1648.34 ml   Output 1470.5 ml   Net 177.84 ml       Admission Weight  Weight: 69 kg (152 lb 1.9 oz) (05/21/25 0901)    Daily Weight  05/24/25 : 78.5 kg (173 lb 1 oz)    Image Results  CT abdomen and pelvis w oral contrast only  Narrative: STUDY:  CT Abdomen and Pelvis without IV Contrast; 5/26/2025 10:21 AM.  INDICATION:  Postoperative leak.  COMPARISON:  None Available.  ACCESSION NUMBER(S):  [id number]  ORDERING CLINICIAN:  BRI JOHNSON  TECHNIQUE:  CT of the abdomen and pelvis was performed.  Contiguous axial images  were obtained at 3 mm slice thickness through the abdomen and pelvis.   Coronal and sagittal reconstructions at 3 mm slice thickness were  performed. No intravenous contrast was administered.  Oral contrast  was administered as a component of this study; the patient received  approximately 60 mL of Gastrografin by mouth.  Automated mA/kV exposure control was utilized and patient examination  was performed in strict accordance with principles of ALARA.  FINDINGS:  Please note that the evaluation of vessels, lymph nodes and organs is  limited without intravenous contrast.      LOWER CHEST:  No cardiomegaly..  Coronary artery calcifications.  No pericardial  effusion.  Small bilateral pleural effusions with bibasilar areas of  atelectasis     ABDOMEN:     LIVER:  No hepatomegaly.  Smooth surface contour.  Normal attenuation.     BILE DUCTS:  No intrahepatic or " extrahepatic biliary ductal dilatation.     GALLBLADDER:  The gallbladder is absent.  STOMACH:  Postsurgical changes of a recent Nissen fundoplication.  Mild gastric  wall thickening.  NG tube present with tip in proximal stomach.  No  evidence for perigastric contrast extravasation.     PANCREAS:  No masses or ductal dilatation.     SPLEEN:  No splenomegaly or focal splenic lesion.     ADRENAL GLANDS:  Right adrenal adenoma measuring 3.5 cm.     KIDNEYS AND URETERS:  Kidneys are normal in size and location.  No renal or ureteral  calculi.  Small bilateral renal cysts.     PELVIS:     BLADDER:  No abnormalities identified.     REPRODUCTIVE ORGANS:  No abnormalities identified.     BOWEL:  Colonic diverticulosis without findings of diverticulitis.  Appendix  is normal.     VESSELS:  No abnormalities identified.  Abdominal aorta is normal in caliber.      PERITONEUM/RETROPERITONEUM/LYMPH NODES:  Minimal perihepatic free fluid.  No pneumoperitoneum.  Surgical drain  noted in the upper abdomen.  No lymphadenopathy.     ABDOMINAL WALL:  No abnormalities identified.  SOFT TISSUES:   No abnormalities identified.     BONES:  No acute fracture or aggressive osseous lesion.  Left total hip  arthroplasty.  Impression: 1.Postsurgical changes of a recent Nissen fundoplication. Mild  gastric wall thickening. NG tube present with tip in proximal stomach.  No evidence for perigastric contrast extravasation.  2.Small bilateral pleural effusions with bibasilar areas of  atelectasis.  3.Right adrenal adenoma.  4.Colonic diverticulosis without findings of diverticulitis.  Signed by Miguel Keenan MD      Physical Exam  Vitals reviewed.   Constitutional:       Appearance: Normal appearance.   HENT:      Head: Normocephalic and atraumatic.      Mouth/Throat:      Mouth: Mucous membranes are moist.   Eyes:      Conjunctiva/sclera: Conjunctivae normal.   Cardiovascular:      Rate and Rhythm: Normal rate.      Pulses: Normal pulses.  "  Pulmonary:      Effort: Pulmonary effort is normal.      Breath sounds: Normal breath sounds. No wheezing.   Abdominal:      General: There is no distension.      Tenderness: There is abdominal tenderness. There is no guarding.   Musculoskeletal:         General: No swelling. Normal range of motion.   Skin:     General: Skin is warm and dry.   Neurological:      General: No focal deficit present.      Mental Status: She is alert. Mental status is at baseline.   Psychiatric:         Mood and Affect: Mood normal.         Behavior: Behavior normal.         Relevant Results                      This patient has a urinary catheter   Reason for the urinary catheter remaining today? critically ill patient who need accurate urinary output measurements          Assessment & Plan  Hiatal hernia    Sepsis without acute organ dysfunction (Multi)    Acute kidney injury    Peritonitis    Chronic bilateral low back pain    Perforated viscus      Post op hernia repair  HTN  Hypokalemia  BRUCE - improved    Medicine consult for HTN  Home meds reviewed.  Takes ARB at home - held for BRUCE.  Continue prn hydralazine.  Was on metoprolol every other day at home, will schedule bid here, and continue to follow.  Replace K+ and trend  Fluids per primary/ nephro.  Some increased pain today, and requests different pain meds.  Ultram added as she doesn't like \"narcotics\"  Will continue to follow with you      Cuauhtemoc Armijo MD      "

## 2025-05-28 LAB
ALBUMIN SERPL BCP-MCNC: 2.7 G/DL (ref 3.4–5)
ALP SERPL-CCNC: 204 U/L (ref 33–136)
ALT SERPL W P-5'-P-CCNC: 21 U/L (ref 7–45)
ANION GAP SERPL CALC-SCNC: 12 MMOL/L (ref 10–20)
AST SERPL W P-5'-P-CCNC: 21 U/L (ref 9–39)
BASOPHILS # BLD AUTO: 0.04 X10*3/UL (ref 0–0.1)
BASOPHILS NFR BLD AUTO: 0.4 %
BILIRUB SERPL-MCNC: 0.5 MG/DL (ref 0–1.2)
BUN SERPL-MCNC: 26 MG/DL (ref 6–23)
CALCIUM SERPL-MCNC: 8.3 MG/DL (ref 8.6–10.3)
CHLORIDE SERPL-SCNC: 103 MMOL/L (ref 98–107)
CO2 SERPL-SCNC: 25 MMOL/L (ref 21–32)
CREAT SERPL-MCNC: 1.3 MG/DL (ref 0.5–1.05)
EGFRCR SERPLBLD CKD-EPI 2021: 42 ML/MIN/1.73M*2
EOSINOPHIL # BLD AUTO: 0.31 X10*3/UL (ref 0–0.4)
EOSINOPHIL NFR BLD AUTO: 2.8 %
ERYTHROCYTE [DISTWIDTH] IN BLOOD BY AUTOMATED COUNT: 14.3 % (ref 11.5–14.5)
GLUCOSE BLD MANUAL STRIP-MCNC: 109 MG/DL (ref 74–99)
GLUCOSE BLD MANUAL STRIP-MCNC: 112 MG/DL (ref 74–99)
GLUCOSE BLD MANUAL STRIP-MCNC: 81 MG/DL (ref 74–99)
GLUCOSE BLD MANUAL STRIP-MCNC: 95 MG/DL (ref 74–99)
GLUCOSE SERPL-MCNC: 85 MG/DL (ref 74–99)
HCT VFR BLD AUTO: 28.8 % (ref 36–46)
HGB BLD-MCNC: 8.7 G/DL (ref 12–16)
IMM GRANULOCYTES # BLD AUTO: 0.44 X10*3/UL (ref 0–0.5)
IMM GRANULOCYTES NFR BLD AUTO: 3.9 % (ref 0–0.9)
LYMPHOCYTES # BLD AUTO: 0.68 X10*3/UL (ref 0.8–3)
LYMPHOCYTES NFR BLD AUTO: 6.1 %
MAGNESIUM SERPL-MCNC: 1.69 MG/DL (ref 1.6–2.4)
MCH RBC QN AUTO: 29.6 PG (ref 26–34)
MCHC RBC AUTO-ENTMCNC: 30.2 G/DL (ref 32–36)
MCV RBC AUTO: 98 FL (ref 80–100)
MONOCYTES # BLD AUTO: 1.04 X10*3/UL (ref 0.05–0.8)
MONOCYTES NFR BLD AUTO: 9.3 %
NEUTROPHILS # BLD AUTO: 8.65 X10*3/UL (ref 1.6–5.5)
NEUTROPHILS NFR BLD AUTO: 77.5 %
NRBC BLD-RTO: 0 /100 WBCS (ref 0–0)
PHOSPHATE SERPL-MCNC: 3.2 MG/DL (ref 2.5–4.9)
PLATELET # BLD AUTO: 304 X10*3/UL (ref 150–450)
POTASSIUM SERPL-SCNC: 3.7 MMOL/L (ref 3.5–5.3)
PROT SERPL-MCNC: 5.5 G/DL (ref 6.4–8.2)
RBC # BLD AUTO: 2.94 X10*6/UL (ref 4–5.2)
SODIUM SERPL-SCNC: 136 MMOL/L (ref 136–145)
WBC # BLD AUTO: 11.2 X10*3/UL (ref 4.4–11.3)

## 2025-05-28 PROCEDURE — 85025 COMPLETE CBC W/AUTO DIFF WBC: CPT | Performed by: SURGERY

## 2025-05-28 PROCEDURE — 80053 COMPREHEN METABOLIC PANEL: CPT | Performed by: SURGERY

## 2025-05-28 PROCEDURE — 1100000001 HC PRIVATE ROOM DAILY

## 2025-05-28 PROCEDURE — 2500000004 HC RX 250 GENERAL PHARMACY W/ HCPCS (ALT 636 FOR OP/ED): Mod: JZ | Performed by: SURGERY

## 2025-05-28 PROCEDURE — 36415 COLL VENOUS BLD VENIPUNCTURE: CPT | Performed by: SURGERY

## 2025-05-28 PROCEDURE — 82947 ASSAY GLUCOSE BLOOD QUANT: CPT

## 2025-05-28 PROCEDURE — 83735 ASSAY OF MAGNESIUM: CPT | Performed by: SURGERY

## 2025-05-28 PROCEDURE — 84100 ASSAY OF PHOSPHORUS: CPT | Performed by: SURGERY

## 2025-05-28 PROCEDURE — 2500000002 HC RX 250 W HCPCS SELF ADMINISTERED DRUGS (ALT 637 FOR MEDICARE OP, ALT 636 FOR OP/ED): Performed by: SURGERY

## 2025-05-28 PROCEDURE — 99232 SBSQ HOSP IP/OBS MODERATE 35: CPT | Performed by: INTERNAL MEDICINE

## 2025-05-28 PROCEDURE — 2500000001 HC RX 250 WO HCPCS SELF ADMINISTERED DRUGS (ALT 637 FOR MEDICARE OP): Performed by: INTERNAL MEDICINE

## 2025-05-28 PROCEDURE — 99024 POSTOP FOLLOW-UP VISIT: CPT | Performed by: SURGERY

## 2025-05-28 PROCEDURE — 2500000005 HC RX 250 GENERAL PHARMACY W/O HCPCS: Performed by: SURGERY

## 2025-05-28 PROCEDURE — 2500000001 HC RX 250 WO HCPCS SELF ADMINISTERED DRUGS (ALT 637 FOR MEDICARE OP): Performed by: SURGERY

## 2025-05-28 RX ORDER — DEXTROSE MONOHYDRATE AND SODIUM CHLORIDE 5; .45 G/100ML; G/100ML
50 INJECTION, SOLUTION INTRAVENOUS CONTINUOUS
Status: ACTIVE | OUTPATIENT
Start: 2025-05-28 | End: 2025-05-29

## 2025-05-28 RX ORDER — ACETAMINOPHEN 10 MG/ML
1000 INJECTION, SOLUTION INTRAVENOUS EVERY 6 HOURS
Status: COMPLETED | OUTPATIENT
Start: 2025-05-28 | End: 2025-05-29

## 2025-05-28 RX ADMIN — FLUCONAZOLE 200 MG: 2 INJECTION, SOLUTION INTRAVENOUS at 16:52

## 2025-05-28 RX ADMIN — ACETAMINOPHEN 1000 MG: 10 INJECTION INTRAVENOUS at 15:53

## 2025-05-28 RX ADMIN — LIDOCAINE 4% 1 PATCH: 40 PATCH TOPICAL at 09:10

## 2025-05-28 RX ADMIN — OXYBUTYNIN CHLORIDE 5 MG: 5 TABLET ORAL at 20:13

## 2025-05-28 RX ADMIN — ACETAMINOPHEN 1000 MG: 10 INJECTION INTRAVENOUS at 09:10

## 2025-05-28 RX ADMIN — PANTOPRAZOLE SODIUM 40 MG: 40 TABLET, DELAYED RELEASE ORAL at 06:01

## 2025-05-28 RX ADMIN — PIPERACILLIN SODIUM AND TAZOBACTAM SODIUM 3.38 G: 3; .375 INJECTION, SOLUTION INTRAVENOUS at 06:01

## 2025-05-28 RX ADMIN — METOPROLOL TARTRATE 12.5 MG: 25 TABLET, FILM COATED ORAL at 20:13

## 2025-05-28 RX ADMIN — DEXTROSE AND SODIUM CHLORIDE 50 ML/HR: 5; .45 INJECTION, SOLUTION INTRAVENOUS at 20:13

## 2025-05-28 RX ADMIN — HEPARIN SODIUM 5000 UNITS: 5000 INJECTION, SOLUTION INTRAVENOUS; SUBCUTANEOUS at 06:01

## 2025-05-28 RX ADMIN — ACETAMINOPHEN 1000 MG: 10 INJECTION INTRAVENOUS at 22:53

## 2025-05-28 RX ADMIN — ACETAMINOPHEN 1000 MG: 10 INJECTION INTRAVENOUS at 03:23

## 2025-05-28 RX ADMIN — HEPARIN SODIUM 5000 UNITS: 5000 INJECTION, SOLUTION INTRAVENOUS; SUBCUTANEOUS at 22:54

## 2025-05-28 RX ADMIN — PIPERACILLIN SODIUM AND TAZOBACTAM SODIUM 3.38 G: 3; .375 INJECTION, SOLUTION INTRAVENOUS at 22:54

## 2025-05-28 RX ADMIN — AMLODIPINE BESYLATE 5 MG: 10 TABLET ORAL at 09:10

## 2025-05-28 RX ADMIN — HEPARIN SODIUM 5000 UNITS: 5000 INJECTION, SOLUTION INTRAVENOUS; SUBCUTANEOUS at 15:02

## 2025-05-28 RX ADMIN — OXYBUTYNIN CHLORIDE 5 MG: 5 TABLET ORAL at 09:10

## 2025-05-28 RX ADMIN — PIPERACILLIN SODIUM AND TAZOBACTAM SODIUM 3.38 G: 3; .375 INJECTION, SOLUTION INTRAVENOUS at 15:02

## 2025-05-28 RX ADMIN — METOPROLOL TARTRATE 12.5 MG: 25 TABLET, FILM COATED ORAL at 09:10

## 2025-05-28 RX ADMIN — OXYBUTYNIN CHLORIDE 5 MG: 5 TABLET ORAL at 15:02

## 2025-05-28 ASSESSMENT — PAIN SCALES - GENERAL
PAINLEVEL_OUTOF10: 0 - NO PAIN
PAINLEVEL_OUTOF10: 3
PAINLEVEL_OUTOF10: 4
PAINLEVEL_OUTOF10: 0 - NO PAIN

## 2025-05-28 ASSESSMENT — PAIN - FUNCTIONAL ASSESSMENT
PAIN_FUNCTIONAL_ASSESSMENT: 0-10

## 2025-05-28 ASSESSMENT — PAIN DESCRIPTION - DESCRIPTORS: DESCRIPTORS: ACHING;DISCOMFORT

## 2025-05-28 NOTE — PROGRESS NOTES
"Beatriz Raymond \"Slava" is a 79 y.o. female on day 5 of admission presenting with Hiatal hernia.    Subjective   Much improved blood pressure control in the past 24 hours.  Afebrile with stable hemodynamics.  Patient tolerated multiple cups of water throughout yesterday, but did not like the taste of other clear liquids.  No nausea or vomiting.  She is passing flatus and having bowel movements.       Objective     Physical Exam  Physical Exam:   Constitutional: Well developed, awake/alert/oriented x3, no distress, alert and cooperative  Eyes: PERRL, EOMI, clear sclera  Head/Neck: Neck supple, no apparent injury, No JVD, trachea midline  Respiratory/Thorax: good chest expansion, thorax symmetric  Cardiovascular: Regular, rate and rhythm,  2+ equal pulses of the extremities  Gastrointestinal: Nondistended, soft, minimally tender, no rebound tenderness or guarding, no masses palpable, incisions c/d/I, ELENI with serous output  Musculoskeletal: ROM intact, no joint swelling, normal strength  Extremities: normal extremities, no cyanosis edema, contusions or wounds, no clubbing  Neurological: alert and oriented x3, intact senses,  Psychological: Appropriate mood and behavior  Skin: Warm and dry, no lesions, no rashes    Last Recorded Vitals  Blood pressure 125/68, pulse 89, temperature 36 °C (96.8 °F), temperature source Temporal, resp. rate 18, height 1.626 m (5' 4.02\"), weight 73.8 kg (162 lb 9.6 oz), SpO2 96%.  Intake/Output last 3 Shifts:  I/O last 3 completed shifts:  In: 2320.3 (31.5 mL/kg) [P.O.:237; I.V.:983.3 (13.3 mL/kg); IV Piggyback:1100]  Out: 2375.5 (32.2 mL/kg) [Urine:2350 (0.9 mL/kg/hr); Drains:25.5]  Weight: 73.8 kg     Relevant Results                              Assessment & Plan  Hiatal hernia    Perforated viscus    Sepsis without acute organ dysfunction (Multi)    Acute kidney injury    Peritonitis    Chronic bilateral low back pain    79F s/p laparoscopic HHR with mesh and Nissen fundoplication on 5/21/25, " who developed a leak in the proximal portion of the fundoplication status post diagnostic laparoscopy and partial gastrectomy, and redo fundoplication on 5/20/2025.   - Continue with p.o. intake.  She may have clear liquids ad jonas. and may start full liquids  - Continue antibiotics, Zosyn and fluconazole  - Daily labs  - Mechanical and chemical DVT prophylaxis  - Avoid NSAIDs and gabapentin  - Appreciate nephrology recommendations  - Appreciate medicine recommendations  - Antiemetics as needed  - Out of bed to chair, may ambulate if she wishes        I spent 60 minutes in the professional and overall care of this patient.      Eliud Hutton MD

## 2025-05-28 NOTE — PROGRESS NOTES
05/28/25 0800   Discharge Planning   Living Arrangements Spouse/significant other   Support Systems Spouse/significant other   Assistance Needed A&OX4; independent with ADLs with walker; room air baseline and currently room air; PCP Dr Gill   Home or Post Acute Services None   Expected Discharge Disposition Home  (Pt and spouse aware of dc today. Spouse and patient deny home going needs. Spouse to transport home when dc confirmed and completed. Pt may have meds to beds)

## 2025-05-28 NOTE — PROGRESS NOTES
"Beatriz Raymond \"Slava" is a 79 y.o. female on day 5 of admission presenting with Hiatal hernia.      Subjective     Seen resting in bed with nursing.  Tolerating liquids.         Objective     Last Recorded Vitals  /68 (BP Location: Left arm, Patient Position: Sitting)   Pulse 89   Temp 36 °C (96.8 °F) (Temporal)   Resp 18   Wt 73.8 kg (162 lb 9.6 oz)   SpO2 96%   Intake/Output last 3 Shifts:    Intake/Output Summary (Last 24 hours) at 5/28/2025 1018  Last data filed at 5/28/2025 0900  Gross per 24 hour   Intake 1169 ml   Output 1455 ml   Net -286 ml       Admission Weight  Weight: 69 kg (152 lb 1.9 oz) (05/21/25 0901)    Daily Weight  05/28/25 : 73.8 kg (162 lb 9.6 oz)    Image Results      Physical Exam  Vitals reviewed.   Constitutional:       Appearance: Normal appearance.   HENT:      Head: Normocephalic and atraumatic.      Mouth/Throat:      Mouth: Mucous membranes are moist.   Eyes:      Conjunctiva/sclera: Conjunctivae normal.   Cardiovascular:      Rate and Rhythm: Normal rate.      Pulses: Normal pulses.   Pulmonary:      Effort: Pulmonary effort is normal.      Breath sounds: Normal breath sounds. No wheezing.   Abdominal:      General: There is no distension.      Tenderness: There is abdominal tenderness. There is no guarding.   Musculoskeletal:         General: No swelling. Normal range of motion.   Skin:     General: Skin is warm and dry.   Neurological:      General: No focal deficit present.      Mental Status: She is alert. Mental status is at baseline.   Psychiatric:         Mood and Affect: Mood normal.         Behavior: Behavior normal.         Relevant Results                    Assessment & Plan  Hiatal hernia    Sepsis without acute organ dysfunction (Multi)    Acute kidney injury    Peritonitis    Chronic bilateral low back pain    Perforated viscus      Post op hernia repair  HTN  Hypokalemia  BRUCE - improved    BP improved.  Currently on increased dose of metoprolol  As renal " function improves, would resume home ARB at home and stop metoprolol completely at discharge. Recommend close follow up with PCP for BP check.  Continue prn hydralazine.  Lytes stable  Jenkins per surgery  Medically stable for discharge per primary team with above home med changes, otherwise continue home meds at discharge.        Cuauhtemoc Armijo MD

## 2025-05-28 NOTE — CARE PLAN
Problem: Pain  Goal: Takes deep breaths with improved pain control throughout the shift  Outcome: Progressing  Goal: Turns in bed with improved pain control throughout the shift  Outcome: Progressing  Goal: Walks with improved pain control throughout the shift  Outcome: Progressing  Goal: Performs ADL's with improved pain control throughout shift  Outcome: Progressing  Goal: Participates in PT with improved pain control throughout the shift  Outcome: Progressing  Goal: Free from opioid side effects throughout the shift  Outcome: Progressing  Goal: Free from acute confusion related to pain meds throughout the shift  Outcome: Progressing     Problem: Skin  Goal: Decreased wound size/increased tissue granulation at next dressing change  Outcome: Progressing  Goal: Participates in plan/prevention/treatment measures  Outcome: Progressing  Goal: Prevent/manage excess moisture  Outcome: Progressing  Goal: Prevent/minimize sheer/friction injuries  Outcome: Progressing  Goal: Promote/optimize nutrition  Outcome: Progressing  Goal: Promote skin healing  Outcome: Progressing     Problem: Fall/Injury  Goal: Not fall by end of shift  Outcome: Progressing     Problem: Pain - Adult  Goal: Verbalizes/displays adequate comfort level or baseline comfort level  Outcome: Progressing     Problem: Safety - Adult  Goal: Free from fall injury  Outcome: Progressing     Problem: Discharge Planning  Goal: Discharge to home or other facility with appropriate resources  Outcome: Progressing     Problem: Chronic Conditions and Co-morbidities  Goal: Patient's chronic conditions and co-morbidity symptoms are monitored and maintained or improved  Outcome: Progressing     Problem: Nutrition  Goal: Nutrient intake appropriate for maintaining nutritional needs  Outcome: Progressing

## 2025-05-28 NOTE — CARE PLAN
The patient's goals for the shift include Pain control    The clinical goals for the shift include pain management      Problem: Pain  Goal: Takes deep breaths with improved pain control throughout the shift  Outcome: Progressing     Problem: Pain  Goal: Turns in bed with improved pain control throughout the shift  Outcome: Progressing     Problem: Pain  Goal: Walks with improved pain control throughout the shift  Outcome: Progressing     Problem: Pain  Goal: Free from opioid side effects throughout the shift  Outcome: Progressing     Problem: Skin  Goal: Prevent/minimize sheer/friction injuries  Outcome: Progressing     Problem: Fall/Injury  Goal: Not fall by end of shift  Outcome: Progressing

## 2025-05-28 NOTE — PROGRESS NOTES
NEPHROLOGY PROGRESS NOTE    REASON FOR CONSULT: BRUCE on CKD stage III and hypertension    SUBJECTIVE:  Pain is controlled.  Sitting up in chair.   patient is on full liquid diet.  She has been having diarrhea but the frequency has come down.      OBJECTIVE:    Visit Vitals  /68 (BP Location: Left arm, Patient Position: Sitting)   Pulse 89   Temp 36 °C (96.8 °F) (Temporal)   Resp 18          Intake/Output Summary (Last 24 hours) at 5/28/2025 1002  Last data filed at 5/28/2025 0900  Gross per 24 hour   Intake 1169 ml   Output 1455 ml   Net -286 ml        General: Awake and Alert, In no distress, Cooperative  HEENT: Oral mucosa moist, EOMI  NECK: Supple  CHEST: No crackles, no wheeze, no tachypnea  CVS: S1,S2 heard, no rubs, RRR  ABD: Soft, tenderness, BS present  EXT: no edema    MEDICATION:    Scheduled medications  Scheduled Medications[1]  Continuous medications  Continuous Medications[2]  PRN medications  PRN Medications[3]     RESULTS:    Lab Results   Component Value Date    WBC 11.2 05/28/2025    HGB 8.7 (L) 05/28/2025    HCT 28.8 (L) 05/28/2025    MCV 98 05/28/2025     05/28/2025        Lab Results   Component Value Date    CREATININE 1.30 (H) 05/28/2025    BUN 26 (H) 05/28/2025     05/28/2025    K 3.7 05/28/2025     05/28/2025    CO2 25 05/28/2025        Radiology Imaging reviewed      ASSESSMENT/PLAN:     1.  BRUCE: Patient's creatinine is back to her baseline with today's creatinine at 1.3.  Peak creatinine was 2.3.  Baseline creatinine is between 1.1-1.3.  Likely hemodynamic effect in the setting of Nissen's fundoplication surgery and subsequent partial gastrectomy.  Stop the IV fluids.    2.  Hypertension: Blood pressure is controlled on the amlodipine 5 mg daily and metoprolol 12.5 mg twice daily . Continue to hold losartan.    3.  CKD stage III: Baseline creatinine between 1.1-1.3.  She has longstanding history of hypertension.    Thank You very much for allowing me to participate  in the care of this Patient    This document was created using dragon dictation and may contain unintended error    Jono Cole MD   05/28/25              [1] acetaminophen, 1,000 mg, intravenous, q6h  amLODIPine, 5 mg, oral, Daily  fluconazole, 200 mg, intravenous, q24h  heparin (porcine), 5,000 Units, subcutaneous, q8h  lidocaine, 1 patch, transdermal, Daily  metoprolol tartrate, 12.5 mg, oral, BID  oxyBUTYnin, 5 mg, oral, TID  oxygen, , inhalation, Continuous - Inhalation  pantoprazole, 40 mg, oral, Daily before breakfast   Or  pantoprazole, 40 mg, intravenous, Daily before breakfast  piperacillin-tazobactam, 3.375 g, intravenous, q8h  [Held by provider] sulfaSALAzine, 500 mg, oral, 4x daily     [2]    [3] PRN medications: benzocaine, bisacodyl, dextrose, dextrose, glucagon, glucagon, hydrALAZINE, HYDROmorphone, HYDROmorphone, HYDROmorphone, ondansetron **OR** ondansetron, simethicone, traMADol

## 2025-05-29 ENCOUNTER — APPOINTMENT (OUTPATIENT)
Dept: VASCULAR MEDICINE | Facility: HOSPITAL | Age: 80
End: 2025-05-29
Payer: MEDICARE

## 2025-05-29 ENCOUNTER — APPOINTMENT (OUTPATIENT)
Dept: SURGERY | Facility: CLINIC | Age: 80
End: 2025-05-29
Payer: MEDICARE

## 2025-05-29 ENCOUNTER — APPOINTMENT (OUTPATIENT)
Dept: RADIOLOGY | Facility: HOSPITAL | Age: 80
End: 2025-05-29
Payer: MEDICARE

## 2025-05-29 LAB
ALBUMIN SERPL BCP-MCNC: 2.9 G/DL (ref 3.4–5)
ALP SERPL-CCNC: 208 U/L (ref 33–136)
ALT SERPL W P-5'-P-CCNC: 20 U/L (ref 7–45)
AMORPH CRY #/AREA UR COMP ASSIST: ABNORMAL /HPF
ANION GAP SERPL CALC-SCNC: 16 MMOL/L (ref 10–20)
APPEARANCE UR: CLEAR
AST SERPL W P-5'-P-CCNC: 17 U/L (ref 9–39)
BACTERIA #/AREA URNS AUTO: ABNORMAL /HPF
BASOPHILS # BLD MANUAL: 0 X10*3/UL (ref 0–0.1)
BASOPHILS NFR BLD MANUAL: 0 %
BILIRUB SERPL-MCNC: 0.5 MG/DL (ref 0–1.2)
BILIRUB UR STRIP.AUTO-MCNC: NEGATIVE MG/DL
BUN SERPL-MCNC: 27 MG/DL (ref 6–23)
CALCIUM SERPL-MCNC: 8.4 MG/DL (ref 8.6–10.3)
CHLORIDE SERPL-SCNC: 99 MMOL/L (ref 98–107)
CO2 SERPL-SCNC: 22 MMOL/L (ref 21–32)
COLOR UR: YELLOW
CREAT SERPL-MCNC: 1.42 MG/DL (ref 0.5–1.05)
EGFRCR SERPLBLD CKD-EPI 2021: 38 ML/MIN/1.73M*2
EOSINOPHIL # BLD MANUAL: 0.69 X10*3/UL (ref 0–0.4)
EOSINOPHIL NFR BLD MANUAL: 4 %
ERYTHROCYTE [DISTWIDTH] IN BLOOD BY AUTOMATED COUNT: 14.7 % (ref 11.5–14.5)
GLUCOSE SERPL-MCNC: 105 MG/DL (ref 74–99)
GLUCOSE UR STRIP.AUTO-MCNC: NORMAL MG/DL
HCT VFR BLD AUTO: 30.3 % (ref 36–46)
HGB BLD-MCNC: 9.1 G/DL (ref 12–16)
HYPOCHROMIA BLD QL SMEAR: ABNORMAL
IMM GRANULOCYTES # BLD AUTO: 1.13 X10*3/UL (ref 0–0.5)
IMM GRANULOCYTES NFR BLD AUTO: 6.5 % (ref 0–0.9)
KETONES UR STRIP.AUTO-MCNC: NEGATIVE MG/DL
LEUKOCYTE ESTERASE UR QL STRIP.AUTO: NEGATIVE
LYMPHOCYTES # BLD MANUAL: 0.87 X10*3/UL (ref 0.8–3)
LYMPHOCYTES NFR BLD MANUAL: 5 %
MAGNESIUM SERPL-MCNC: 1.7 MG/DL (ref 1.6–2.4)
MCH RBC QN AUTO: 29 PG (ref 26–34)
MCHC RBC AUTO-ENTMCNC: 30 G/DL (ref 32–36)
MCV RBC AUTO: 97 FL (ref 80–100)
MONOCYTES # BLD MANUAL: 1.21 X10*3/UL (ref 0.05–0.8)
MONOCYTES NFR BLD MANUAL: 7 %
NEUTROPHILS # BLD MANUAL: 14.53 X10*3/UL (ref 1.6–5.5)
NEUTS BAND # BLD MANUAL: 0.52 X10*3/UL (ref 0–0.5)
NEUTS BAND NFR BLD MANUAL: 3 %
NEUTS SEG # BLD MANUAL: 14.01 X10*3/UL (ref 1.6–5)
NEUTS SEG NFR BLD MANUAL: 81 %
NITRITE UR QL STRIP.AUTO: NEGATIVE
NRBC BLD-RTO: 0 /100 WBCS (ref 0–0)
PH UR STRIP.AUTO: 5.5 [PH]
PHOSPHATE SERPL-MCNC: 3.5 MG/DL (ref 2.5–4.9)
PLATELET # BLD AUTO: 398 X10*3/UL (ref 150–450)
POLYCHROMASIA BLD QL SMEAR: ABNORMAL
POTASSIUM SERPL-SCNC: 3.5 MMOL/L (ref 3.5–5.3)
PROT SERPL-MCNC: 5.8 G/DL (ref 6.4–8.2)
PROT UR STRIP.AUTO-MCNC: NORMAL MG/DL
RBC # BLD AUTO: 3.14 X10*6/UL (ref 4–5.2)
RBC # UR STRIP.AUTO: NEGATIVE MG/DL
RBC #/AREA URNS AUTO: ABNORMAL /HPF
RBC MORPH BLD: ABNORMAL
SODIUM SERPL-SCNC: 133 MMOL/L (ref 136–145)
SP GR UR STRIP.AUTO: 1.02
SQUAMOUS #/AREA URNS AUTO: ABNORMAL /HPF
TOTAL CELLS COUNTED BLD: 100
UROBILINOGEN UR STRIP.AUTO-MCNC: NORMAL MG/DL
WBC # BLD AUTO: 17.3 X10*3/UL (ref 4.4–11.3)
WBC #/AREA URNS AUTO: ABNORMAL /HPF

## 2025-05-29 PROCEDURE — 2500000004 HC RX 250 GENERAL PHARMACY W/ HCPCS (ALT 636 FOR OP/ED): Performed by: SURGERY

## 2025-05-29 PROCEDURE — 74176 CT ABD & PELVIS W/O CONTRAST: CPT

## 2025-05-29 PROCEDURE — 74176 CT ABD & PELVIS W/O CONTRAST: CPT | Performed by: RADIOLOGY

## 2025-05-29 PROCEDURE — 2500000001 HC RX 250 WO HCPCS SELF ADMINISTERED DRUGS (ALT 637 FOR MEDICARE OP): Performed by: SURGERY

## 2025-05-29 PROCEDURE — 93970 EXTREMITY STUDY: CPT

## 2025-05-29 PROCEDURE — 83735 ASSAY OF MAGNESIUM: CPT | Performed by: SURGERY

## 2025-05-29 PROCEDURE — 93970 EXTREMITY STUDY: CPT | Performed by: INTERNAL MEDICINE

## 2025-05-29 PROCEDURE — 2550000001 HC RX 255 CONTRASTS: Performed by: SURGERY

## 2025-05-29 PROCEDURE — 36415 COLL VENOUS BLD VENIPUNCTURE: CPT | Performed by: SURGERY

## 2025-05-29 PROCEDURE — 81001 URINALYSIS AUTO W/SCOPE: CPT | Performed by: INTERNAL MEDICINE

## 2025-05-29 PROCEDURE — 1100000001 HC PRIVATE ROOM DAILY

## 2025-05-29 PROCEDURE — 85007 BL SMEAR W/DIFF WBC COUNT: CPT | Performed by: SURGERY

## 2025-05-29 PROCEDURE — 99232 SBSQ HOSP IP/OBS MODERATE 35: CPT | Performed by: INTERNAL MEDICINE

## 2025-05-29 PROCEDURE — 85027 COMPLETE CBC AUTOMATED: CPT | Performed by: SURGERY

## 2025-05-29 PROCEDURE — 87070 CULTURE OTHR SPECIMN AEROBIC: CPT | Mod: PARLAB | Performed by: INTERNAL MEDICINE

## 2025-05-29 PROCEDURE — 87205 SMEAR GRAM STAIN: CPT | Mod: PARLAB | Performed by: INTERNAL MEDICINE

## 2025-05-29 PROCEDURE — 84100 ASSAY OF PHOSPHORUS: CPT | Performed by: SURGERY

## 2025-05-29 PROCEDURE — 2500000004 HC RX 250 GENERAL PHARMACY W/ HCPCS (ALT 636 FOR OP/ED): Mod: JZ | Performed by: INTERNAL MEDICINE

## 2025-05-29 PROCEDURE — 2500000002 HC RX 250 W HCPCS SELF ADMINISTERED DRUGS (ALT 637 FOR MEDICARE OP, ALT 636 FOR OP/ED): Performed by: SURGERY

## 2025-05-29 PROCEDURE — 80053 COMPREHEN METABOLIC PANEL: CPT | Performed by: SURGERY

## 2025-05-29 PROCEDURE — 2500000005 HC RX 250 GENERAL PHARMACY W/O HCPCS: Performed by: SURGERY

## 2025-05-29 PROCEDURE — A9698 NON-RAD CONTRAST MATERIALNOC: HCPCS | Performed by: SURGERY

## 2025-05-29 PROCEDURE — 2500000001 HC RX 250 WO HCPCS SELF ADMINISTERED DRUGS (ALT 637 FOR MEDICARE OP): Performed by: INTERNAL MEDICINE

## 2025-05-29 RX ORDER — VANCOMYCIN HYDROCHLORIDE 1.25 G/250ML
1250 INJECTION, SOLUTION INTRAVITREAL ONCE
Status: COMPLETED | OUTPATIENT
Start: 2025-05-29 | End: 2025-05-29

## 2025-05-29 RX ORDER — VANCOMYCIN HYDROCHLORIDE 1 G/20ML
INJECTION, POWDER, LYOPHILIZED, FOR SOLUTION INTRAVENOUS DAILY PRN
Status: DISCONTINUED | OUTPATIENT
Start: 2025-05-29 | End: 2025-05-30

## 2025-05-29 RX ORDER — DEXTROSE MONOHYDRATE AND SODIUM CHLORIDE 5; .45 G/100ML; G/100ML
50 INJECTION, SOLUTION INTRAVENOUS CONTINUOUS
Status: DISCONTINUED | OUTPATIENT
Start: 2025-05-29 | End: 2025-05-30

## 2025-05-29 RX ADMIN — HEPARIN SODIUM 5000 UNITS: 5000 INJECTION, SOLUTION INTRAVENOUS; SUBCUTANEOUS at 14:00

## 2025-05-29 RX ADMIN — METOPROLOL TARTRATE 12.5 MG: 25 TABLET, FILM COATED ORAL at 13:59

## 2025-05-29 RX ADMIN — PANTOPRAZOLE SODIUM 40 MG: 40 TABLET, DELAYED RELEASE ORAL at 06:20

## 2025-05-29 RX ADMIN — ACETAMINOPHEN 1000 MG: 10 INJECTION INTRAVENOUS at 04:19

## 2025-05-29 RX ADMIN — DEXTROSE AND SODIUM CHLORIDE 50 ML/HR: 5; .45 INJECTION, SOLUTION INTRAVENOUS at 21:40

## 2025-05-29 RX ADMIN — HEPARIN SODIUM 5000 UNITS: 5000 INJECTION, SOLUTION INTRAVENOUS; SUBCUTANEOUS at 21:39

## 2025-05-29 RX ADMIN — VANCOMYCIN HYDROCHLORIDE 1250 MG: 1.25 INJECTION, SOLUTION INTRAVITREAL at 13:59

## 2025-05-29 RX ADMIN — FLUCONAZOLE 200 MG: 2 INJECTION, SOLUTION INTRAVENOUS at 17:46

## 2025-05-29 RX ADMIN — HEPARIN SODIUM 5000 UNITS: 5000 INJECTION, SOLUTION INTRAVENOUS; SUBCUTANEOUS at 06:20

## 2025-05-29 RX ADMIN — LIDOCAINE 4% 1 PATCH: 40 PATCH TOPICAL at 09:00

## 2025-05-29 RX ADMIN — PIPERACILLIN SODIUM AND TAZOBACTAM SODIUM 3.38 G: 3; .375 INJECTION, SOLUTION INTRAVENOUS at 13:59

## 2025-05-29 RX ADMIN — METOPROLOL TARTRATE 12.5 MG: 25 TABLET, FILM COATED ORAL at 21:39

## 2025-05-29 RX ADMIN — OXYBUTYNIN CHLORIDE 5 MG: 5 TABLET ORAL at 21:39

## 2025-05-29 RX ADMIN — OXYBUTYNIN CHLORIDE 5 MG: 5 TABLET ORAL at 17:47

## 2025-05-29 RX ADMIN — IOHEXOL 500 ML: 12 SOLUTION ORAL at 16:52

## 2025-05-29 RX ADMIN — PIPERACILLIN SODIUM AND TAZOBACTAM SODIUM 3.38 G: 3; .375 INJECTION, SOLUTION INTRAVENOUS at 06:20

## 2025-05-29 RX ADMIN — AMLODIPINE BESYLATE 5 MG: 10 TABLET ORAL at 13:59

## 2025-05-29 ASSESSMENT — PAIN SCALES - GENERAL: PAINLEVEL_OUTOF10: 0 - NO PAIN

## 2025-05-29 ASSESSMENT — PAIN - FUNCTIONAL ASSESSMENT: PAIN_FUNCTIONAL_ASSESSMENT: 0-10

## 2025-05-29 NOTE — CONSULTS
Consults   referring physician Dr. Barfield  History of present illness    This is a 79-year-old female who presents for elective surgery.  She was admitted for repair of a hiatal hernia and Nissen fundoplication.  The patient was doing well but decompensated postoperatively.  She had a perforated stomach and went back to the OR and had a partial gastrectomy.  We are called for further antibiotic management for increasing leukocytosis    Past medical history significant for anemia, arthritis, cataracts, colonic polyps, fibroids, GERD, hard of hearing, hypertension, rheumatoid arthritis, sarcoidosis, sarcoma of the right thigh    Past surgical history cholecystectomy, hysterectomy, EGD, colonoscopy, cataract extraction, hip arthroplasty, knee arthroplasty with meniscal tear repair, leg surgery    Social history no drinking smoking drug use  Family history noncontributory  Allergies none antibiotic allergies  A 10 point review system was obtained with the patient denying any complaints except of those listed in HPI above.      Physical exam  Patient was examined with nurse present at bedside along with     HEENT PERRLA  Chest entry bilaterally  CVS S1-S2 regular  Abdomen soft nontender incisions well-healed ELENI drain in place draining slightly cloudy serous fluid    Labs and radiology reviewed    Impression:  39-year-old female status post gastrectomy and hiatal hernia repair with increasing leukocytosis.  The source is not clear at this time.  In the meantime we will recommend the following    Suggestions:  1.  Continue Zosyn and Diflucan will empirically add in Vanco for broader gram-positive coverage  2.  Will send a UA to make sure it is does not have a UTI, and resubmit the ELENI drain for fluid culture    Thanks for this consult follow with you as needed    I have reviewed and interpreted all lab test imaging studies and documentations from other healthcare providers  I am monitoring patient for side effects and  toxicity and vancomycin levels

## 2025-05-29 NOTE — CONSULTS
Vancomycin Dosing by Pharmacy- INITIAL    Beatriz Raymond is a 79 y.o. year old female who Pharmacy has been consulted for vancomycin dosing for other s/p abdominal surgery and leukocytosis. Based on the patient's indication and renal status this patient will be dosed based on a goal trough/random level of 15-20.     Renal function is currently declining.    Visit Vitals  /65 (BP Location: Right arm, Patient Position: Lying)   Pulse 77   Temp 36.4 °C (97.5 °F) (Temporal)   Resp 18        Lab Results   Component Value Date    CREATININE 1.42 (H) 2025    CREATININE 1.30 (H) 2025    CREATININE 1.27 (H) 2025    CREATININE 2.02 (H) 2025    CREATININE 2.02 (H) 2025    CREATININE 1.22 (H) 2025        Patient weight is as follows:   Vitals:    25 0600   Weight: 73.8 kg (162 lb 9.6 oz)       Cultures:  No results found for the encounter in last 14 days.        I/O last 3 completed shifts:  In: 1449.5 (19.7 mL/kg) [P.O.:477; I.V.:472.5 (6.4 mL/kg); IV Piggyback:500]  Out: 1935 (26.2 mL/kg) [Urine:1925 (0.7 mL/kg/hr); Drains:10]  Weight: 73.8 kg   I/O during current shift:  I/O this shift:  In: 681.7 [P.O.:240; I.V.:291.7; IV Piggyback:150]  Out: 100 [Urine:100]    Temp (24hrs), Av.6 °C (97.9 °F), Min:36.3 °C (97.3 °F), Max:36.8 °C (98.2 °F)         Assessment/Plan     Patient will not be given a loading dose.  Will initiate vancomycin maintenance, a one time dose of 1250 mg.    This dosing regimen is predicted by [employer] to result in the following pharmacokinetic parameters:    Pharmacy is dosing by levels.   Follow-up level will be ordered on  at 1300 unless clinically indicated sooner.  Will continue to monitor renal function daily while on vancomycin and order serum creatinine at least every 48 hours if not already ordered.  Follow for continued vancomycin needs, clinical response, and signs/symptoms of toxicity.       Jasvir Linn, PharmD

## 2025-05-29 NOTE — PROGRESS NOTES
NEPHROLOGY PROGRESS NOTE    REASON FOR CONSULT: BRUCE on CKD stage III and hypertension    SUBJECTIVE:  Patient came back from an ultrasound of the lower extremity.  She does have some lower extremity swelling.  She has been having small loose bowel movement every are.  She is currently n.p.o. for the CT scan of the abdomen.  Her white cell count did go up.  Discussed with bariatric surgery.  Discussed with the nurse.    OBJECTIVE:    Visit Vitals  /56   Pulse 76   Temp 36.6 °C (97.9 °F)   Resp 18          Intake/Output Summary (Last 24 hours) at 5/29/2025 0925  Last data filed at 5/29/2025 0918  Gross per 24 hour   Intake 1044.17 ml   Output 1030 ml   Net 14.17 ml        General: Awake and Alert, In no distress, Cooperative  HEENT: Oral mucosa moist, EOMI  NECK: Supple  CHEST: No crackles, no wheeze, no tachypnea  CVS: S1,S2 heard, no rubs, RRR  ABD: Soft, tenderness, BS present  EXT: Bilateral lower extremity 1+ edema    MEDICATION:    Scheduled medications  Scheduled Medications[1]  Continuous medications  Continuous Medications[2]  PRN medications  PRN Medications[3]     RESULTS:    Lab Results   Component Value Date    WBC 17.3 (H) 05/29/2025    HGB 9.1 (L) 05/29/2025    HCT 30.3 (L) 05/29/2025    MCV 97 05/29/2025     05/29/2025        Lab Results   Component Value Date    CREATININE 1.42 (H) 05/29/2025    BUN 27 (H) 05/29/2025     (L) 05/29/2025    K 3.5 05/29/2025    CL 99 05/29/2025    CO2 22 05/29/2025        Radiology Imaging reviewed      ASSESSMENT/PLAN:     1.  BRUCE: Creatinine is up to 1.42 likely in the setting of frequent diarrhea.  She is currently NPO.  Her IV fluids have resumed.  Peak creatinine was 2.3.  Baseline creatinine is between 1.1-1.3.  Likely hemodynamic effect in the setting of Nissen's fundoplication surgery and subsequent partial gastrectomy.  Will continue IV fluids today.  Encourage good protein intake.  Okay to proceed with the CT scan of the abdomen and if needed  to use contrast    2.  Hypertension: Blood pressure is controlled on the amlodipine 5 mg daily and metoprolol 12.5 mg twice daily . Continue to hold losartan.    3.  CKD stage III: Baseline creatinine between 1.1-1.3.  She has longstanding history of hypertension.    Thank You very much for allowing me to participate in the care of this Patient    This document was created using dragon dictation and may contain unintended error    Jono Cole MD   05/29/25              [1] amLODIPine, 5 mg, oral, Daily  fluconazole, 200 mg, intravenous, q24h  heparin (porcine), 5,000 Units, subcutaneous, q8h  lidocaine, 1 patch, transdermal, Daily  metoprolol tartrate, 12.5 mg, oral, BID  oxyBUTYnin, 5 mg, oral, TID  oxygen, , inhalation, Continuous - Inhalation  pantoprazole, 40 mg, oral, Daily before breakfast   Or  pantoprazole, 40 mg, intravenous, Daily before breakfast  piperacillin-tazobactam, 3.375 g, intravenous, q8h  [Held by provider] sulfaSALAzine, 500 mg, oral, 4x daily     [2] dextrose 5%-0.45 % sodium chloride, 50 mL/hr, Last Rate: 50 mL/hr (05/29/25 0918)     [3] PRN medications: benzocaine, bisacodyl, dextrose, dextrose, glucagon, glucagon, hydrALAZINE, HYDROmorphone, HYDROmorphone, HYDROmorphone, ondansetron **OR** ondansetron, simethicone, traMADol

## 2025-05-29 NOTE — PROGRESS NOTES
"Beatriz Raymond \"Slava" is a 79 y.o. female on day 6 of admission presenting with Hiatal hernia.    Subjective   Patient has had persistent diarrhea overnight, standing every 60 minutes or so.  Afebrile with stable hemodynamics.  Leukocytosis of 17,000 this morning.  Slightly worsening BRUCE as well with a creatinine of 1.4.  She is tolerating liquids, albeit struggling with thicker liquids for    Objective     Physical Exam  Physical Exam:   Constitutional: Well developed, awake/alert/oriented x3, no distress, alert and cooperative  Eyes: PERRL, EOMI, clear sclera  Head/Neck: Neck supple, no apparent injury, No JVD, trachea midline  Respiratory/Thorax: good chest expansion, thorax symmetric  Cardiovascular: Regular, rate and rhythm,  2+ equal pulses of the extremities  Gastrointestinal: Nondistended, soft, minimally tender, no rebound tenderness or guarding, no masses palpable, incisions c/d/I, ELENI with serous output  Musculoskeletal: ROM intact, no joint swelling, normal strength  Extremities: normal extremities, no cyanosis edema, contusions or wounds, no clubbing  Neurological: alert and oriented x3, intact senses,  Psychological: Appropriate mood and behavior  Skin: Warm and dry, no lesions, no rashes    Last Recorded Vitals  Blood pressure 121/56, pulse 76, temperature 36.6 °C (97.9 °F), resp. rate 18, height 1.626 m (5' 4.02\"), weight 73.8 kg (162 lb 9.6 oz), SpO2 95%.  Intake/Output last 3 Shifts:  I/O last 3 completed shifts:  In: 1449.5 (19.7 mL/kg) [P.O.:477; I.V.:472.5 (6.4 mL/kg); IV Piggyback:500]  Out: 1935 (26.2 mL/kg) [Urine:1925 (0.7 mL/kg/hr); Drains:10]  Weight: 73.8 kg     Relevant Results                              Assessment & Plan  Hiatal hernia    Perforated viscus    Sepsis without acute organ dysfunction (Multi)    Acute kidney injury    Peritonitis    Chronic bilateral low back pain    79F s/p laparoscopic HHR with mesh and Nissen fundoplication on 5/21/25, who developed a leak in the proximal " portion of the fundoplication status post diagnostic laparoscopy and partial gastrectomy, and redo fundoplication on 5/20/2025.   - Continue with p.o. intake.  She may have clear liquids ad jonas. and may start full liquids  - Continue antibiotics, Zosyn and fluconazole  - Daily labs  - Mechanical and chemical DVT prophylaxis  - Avoid NSAIDs and gabapentin  - Appreciate nephrology recommendations  - Appreciate medicine recommendations  - Antiemetics as needed  - Out of bed to chair, may ambulate if she wishes  -Will obtain bilateral lower extremity venous Dopplers  - Obtain CT abdomen and pelvis without IV contrast, but with p.o. contrast today  - Infectious disease consultation        I spent 60 minutes in the professional and overall care of this patient.      Eliud Hutton MD

## 2025-05-29 NOTE — PROGRESS NOTES
"Beatriz Raymond \"Slava" is a 79 y.o. female on day 6 of admission presenting with Hiatal hernia.      Subjective     Up in chair with  at bedside.  Has some back pains, and complains of diarrhea.           Objective     Last Recorded Vitals  /56 (BP Location: Right arm, Patient Position: Sitting)   Pulse 76   Temp 36.6 °C (97.9 °F) (Temporal)   Resp 18   Wt 73.8 kg (162 lb 9.6 oz)   SpO2 95%   Intake/Output last 3 Shifts:    Intake/Output Summary (Last 24 hours) at 5/29/2025 1119  Last data filed at 5/29/2025 0918  Gross per 24 hour   Intake 1044.17 ml   Output 1130 ml   Net -85.83 ml       Admission Weight  Weight: 69 kg (152 lb 1.9 oz) (05/21/25 0901)    Daily Weight  05/28/25 : 73.8 kg (162 lb 9.6 oz)    Image Results      Physical Exam  Vitals reviewed.   Constitutional:       Appearance: Normal appearance.   HENT:      Head: Normocephalic and atraumatic.      Mouth/Throat:      Mouth: Mucous membranes are moist.   Eyes:      Conjunctiva/sclera: Conjunctivae normal.   Cardiovascular:      Rate and Rhythm: Normal rate.      Pulses: Normal pulses.   Pulmonary:      Effort: Pulmonary effort is normal.      Breath sounds: Normal breath sounds. No wheezing.   Abdominal:      General: There is no distension.      Tenderness: There is abdominal tenderness. There is no guarding.   Musculoskeletal:         General: No swelling. Normal range of motion.   Skin:     General: Skin is warm and dry.   Neurological:      General: No focal deficit present.      Mental Status: She is alert. Mental status is at baseline.   Psychiatric:         Mood and Affect: Mood normal.         Behavior: Behavior normal.         Relevant Results                    Assessment & Plan  Hiatal hernia    Sepsis without acute organ dysfunction (Multi)    Acute kidney injury    Peritonitis    Chronic bilateral low back pain    Perforated viscus      Post op hernia repair  Diarrhea  HTN  Hypokalemia  BRUCE - improved    BP stable.  Diarrhea " - likely antibiotic side effect, unlikely Cdiff as recent testing negative. OK for imodium  Leukocytosis increased today, imaging per surgery.  Antibiotics broadened by ID.  Back pain - multimodal pain control.  Currently on increased dose of metoprolol.  Creatinine slightly up today in setting of diarrhea - IV fluids per nephro, continue holding ARB.  Continue prn hydralazine  Jenkins per surgery    Cuauhetmoc Armijo MD

## 2025-05-30 ENCOUNTER — APPOINTMENT (OUTPATIENT)
Dept: SURGERY | Facility: CLINIC | Age: 80
End: 2025-05-30
Payer: MEDICARE

## 2025-05-30 LAB
ALBUMIN SERPL BCP-MCNC: 2.6 G/DL (ref 3.4–5)
ALP SERPL-CCNC: 156 U/L (ref 33–136)
ALT SERPL W P-5'-P-CCNC: 12 U/L (ref 7–45)
ANION GAP SERPL CALC-SCNC: 13 MMOL/L (ref 10–20)
AST SERPL W P-5'-P-CCNC: 8 U/L (ref 9–39)
BASOPHILS # BLD MANUAL: 0 X10*3/UL (ref 0–0.1)
BASOPHILS NFR BLD MANUAL: 0 %
BILIRUB SERPL-MCNC: 0.4 MG/DL (ref 0–1.2)
BUN SERPL-MCNC: 21 MG/DL (ref 6–23)
CALCIUM SERPL-MCNC: 8.1 MG/DL (ref 8.6–10.3)
CHLORIDE SERPL-SCNC: 100 MMOL/L (ref 98–107)
CO2 SERPL-SCNC: 21 MMOL/L (ref 21–32)
CREAT SERPL-MCNC: 1.3 MG/DL (ref 0.5–1.05)
EGFRCR SERPLBLD CKD-EPI 2021: 42 ML/MIN/1.73M*2
EOSINOPHIL # BLD MANUAL: 0 X10*3/UL (ref 0–0.4)
EOSINOPHIL NFR BLD MANUAL: 0 %
ERYTHROCYTE [DISTWIDTH] IN BLOOD BY AUTOMATED COUNT: 15 % (ref 11.5–14.5)
GLUCOSE SERPL-MCNC: 110 MG/DL (ref 74–99)
HCT VFR BLD AUTO: 24.6 % (ref 36–46)
HGB BLD-MCNC: 7.8 G/DL (ref 12–16)
IMM GRANULOCYTES # BLD AUTO: 1.21 X10*3/UL (ref 0–0.5)
IMM GRANULOCYTES NFR BLD AUTO: 6.3 % (ref 0–0.9)
LYMPHOCYTES # BLD MANUAL: 0.58 X10*3/UL (ref 0.8–3)
LYMPHOCYTES NFR BLD MANUAL: 3 %
MAGNESIUM SERPL-MCNC: 1.6 MG/DL (ref 1.6–2.4)
MCH RBC QN AUTO: 30.2 PG (ref 26–34)
MCHC RBC AUTO-ENTMCNC: 31.7 G/DL (ref 32–36)
MCV RBC AUTO: 95 FL (ref 80–100)
MONOCYTES # BLD MANUAL: 0.58 X10*3/UL (ref 0.05–0.8)
MONOCYTES NFR BLD MANUAL: 3 %
NEUTS SEG # BLD MANUAL: 18.24 X10*3/UL (ref 1.6–5)
NEUTS SEG NFR BLD MANUAL: 94 %
NRBC BLD-RTO: 0 /100 WBCS (ref 0–0)
OVALOCYTES BLD QL SMEAR: ABNORMAL
PHOSPHATE SERPL-MCNC: 3.3 MG/DL (ref 2.5–4.9)
PLATELET # BLD AUTO: 372 X10*3/UL (ref 150–450)
POTASSIUM SERPL-SCNC: 3 MMOL/L (ref 3.5–5.3)
PROT SERPL-MCNC: 5.2 G/DL (ref 6.4–8.2)
RBC # BLD AUTO: 2.58 X10*6/UL (ref 4–5.2)
RBC MORPH BLD: ABNORMAL
SCHISTOCYTES BLD QL SMEAR: ABNORMAL
SODIUM SERPL-SCNC: 131 MMOL/L (ref 136–145)
TOTAL CELLS COUNTED BLD: 100
VANCOMYCIN SERPL-MCNC: 10.3 UG/ML (ref 5–20)
WBC # BLD AUTO: 19.4 X10*3/UL (ref 4.4–11.3)

## 2025-05-30 PROCEDURE — 99024 POSTOP FOLLOW-UP VISIT: CPT | Performed by: SURGERY

## 2025-05-30 PROCEDURE — 80053 COMPREHEN METABOLIC PANEL: CPT | Performed by: SURGERY

## 2025-05-30 PROCEDURE — 36415 COLL VENOUS BLD VENIPUNCTURE: CPT | Performed by: INTERNAL MEDICINE

## 2025-05-30 PROCEDURE — 2500000002 HC RX 250 W HCPCS SELF ADMINISTERED DRUGS (ALT 637 FOR MEDICARE OP, ALT 636 FOR OP/ED): Performed by: INTERNAL MEDICINE

## 2025-05-30 PROCEDURE — 84100 ASSAY OF PHOSPHORUS: CPT | Performed by: SURGERY

## 2025-05-30 PROCEDURE — 2500000001 HC RX 250 WO HCPCS SELF ADMINISTERED DRUGS (ALT 637 FOR MEDICARE OP): Performed by: INTERNAL MEDICINE

## 2025-05-30 PROCEDURE — 2500000005 HC RX 250 GENERAL PHARMACY W/O HCPCS: Performed by: SURGERY

## 2025-05-30 PROCEDURE — 2500000001 HC RX 250 WO HCPCS SELF ADMINISTERED DRUGS (ALT 637 FOR MEDICARE OP): Performed by: SURGERY

## 2025-05-30 PROCEDURE — 83735 ASSAY OF MAGNESIUM: CPT | Performed by: SURGERY

## 2025-05-30 PROCEDURE — 87493 C DIFF AMPLIFIED PROBE: CPT | Mod: PARLAB | Performed by: INTERNAL MEDICINE

## 2025-05-30 PROCEDURE — 2500000004 HC RX 250 GENERAL PHARMACY W/ HCPCS (ALT 636 FOR OP/ED): Mod: JZ | Performed by: SURGERY

## 2025-05-30 PROCEDURE — 1100000001 HC PRIVATE ROOM DAILY

## 2025-05-30 PROCEDURE — 85027 COMPLETE CBC AUTOMATED: CPT | Performed by: SURGERY

## 2025-05-30 PROCEDURE — 87506 IADNA-DNA/RNA PROBE TQ 6-11: CPT | Mod: PARLAB | Performed by: INTERNAL MEDICINE

## 2025-05-30 PROCEDURE — 85007 BL SMEAR W/DIFF WBC COUNT: CPT | Performed by: SURGERY

## 2025-05-30 PROCEDURE — 2500000002 HC RX 250 W HCPCS SELF ADMINISTERED DRUGS (ALT 637 FOR MEDICARE OP, ALT 636 FOR OP/ED): Performed by: SURGERY

## 2025-05-30 PROCEDURE — 36415 COLL VENOUS BLD VENIPUNCTURE: CPT | Performed by: SURGERY

## 2025-05-30 PROCEDURE — 80202 ASSAY OF VANCOMYCIN: CPT | Performed by: INTERNAL MEDICINE

## 2025-05-30 PROCEDURE — 99232 SBSQ HOSP IP/OBS MODERATE 35: CPT | Performed by: INTERNAL MEDICINE

## 2025-05-30 RX ORDER — POTASSIUM CHLORIDE 14.9 MG/ML
20 INJECTION INTRAVENOUS
Status: COMPLETED | OUTPATIENT
Start: 2025-05-30 | End: 2025-05-30

## 2025-05-30 RX ORDER — POTASSIUM CHLORIDE 1.5 G/1.58G
20 POWDER, FOR SOLUTION ORAL ONCE
Status: COMPLETED | OUTPATIENT
Start: 2025-05-30 | End: 2025-05-30

## 2025-05-30 RX ORDER — VANCOMYCIN HYDROCHLORIDE 1.25 G/250ML
1250 INJECTION, SOLUTION INTRAVITREAL ONCE
Status: DISCONTINUED | OUTPATIENT
Start: 2025-05-30 | End: 2025-05-30

## 2025-05-30 RX ORDER — SODIUM CHLORIDE, SODIUM LACTATE, POTASSIUM CHLORIDE, CALCIUM CHLORIDE 600; 310; 30; 20 MG/100ML; MG/100ML; MG/100ML; MG/100ML
50 INJECTION, SOLUTION INTRAVENOUS CONTINUOUS
Status: DISCONTINUED | OUTPATIENT
Start: 2025-05-30 | End: 2025-06-02

## 2025-05-30 RX ORDER — VANCOMYCIN HYDROCHLORIDE 125 MG/1
125 CAPSULE ORAL 4 TIMES DAILY
Status: DISCONTINUED | OUTPATIENT
Start: 2025-05-30 | End: 2025-05-31

## 2025-05-30 RX ADMIN — OXYBUTYNIN CHLORIDE 5 MG: 5 TABLET ORAL at 10:05

## 2025-05-30 RX ADMIN — METOPROLOL TARTRATE 12.5 MG: 25 TABLET, FILM COATED ORAL at 10:05

## 2025-05-30 RX ADMIN — POTASSIUM CHLORIDE 20 MEQ: 14.9 INJECTION, SOLUTION INTRAVENOUS at 10:40

## 2025-05-30 RX ADMIN — POTASSIUM CHLORIDE 20 MEQ: 1.5 POWDER, FOR SOLUTION ORAL at 10:06

## 2025-05-30 RX ADMIN — FLUCONAZOLE 200 MG: 2 INJECTION, SOLUTION INTRAVENOUS at 17:30

## 2025-05-30 RX ADMIN — HEPARIN SODIUM 5000 UNITS: 5000 INJECTION, SOLUTION INTRAVENOUS; SUBCUTANEOUS at 14:33

## 2025-05-30 RX ADMIN — HEPARIN SODIUM 5000 UNITS: 5000 INJECTION, SOLUTION INTRAVENOUS; SUBCUTANEOUS at 06:36

## 2025-05-30 RX ADMIN — PANTOPRAZOLE SODIUM 40 MG: 40 TABLET, DELAYED RELEASE ORAL at 06:36

## 2025-05-30 RX ADMIN — VANCOMYCIN HYDROCHLORIDE 125 MG: 125 CAPSULE ORAL at 17:31

## 2025-05-30 RX ADMIN — LIDOCAINE 4% 1 PATCH: 40 PATCH TOPICAL at 10:04

## 2025-05-30 RX ADMIN — AMLODIPINE BESYLATE 5 MG: 10 TABLET ORAL at 10:05

## 2025-05-30 RX ADMIN — METOPROLOL TARTRATE 12.5 MG: 25 TABLET, FILM COATED ORAL at 21:15

## 2025-05-30 RX ADMIN — PIPERACILLIN SODIUM AND TAZOBACTAM SODIUM 3.38 G: 3; .375 INJECTION, SOLUTION INTRAVENOUS at 18:32

## 2025-05-30 RX ADMIN — PIPERACILLIN SODIUM AND TAZOBACTAM SODIUM 3.38 G: 3; .375 INJECTION, SOLUTION INTRAVENOUS at 10:04

## 2025-05-30 RX ADMIN — OXYBUTYNIN CHLORIDE 5 MG: 5 TABLET ORAL at 15:40

## 2025-05-30 RX ADMIN — SODIUM CHLORIDE, SODIUM LACTATE, POTASSIUM CHLORIDE, AND CALCIUM CHLORIDE 50 ML/HR: .6; .31; .03; .02 INJECTION, SOLUTION INTRAVENOUS at 10:01

## 2025-05-30 RX ADMIN — VANCOMYCIN HYDROCHLORIDE 125 MG: 125 CAPSULE ORAL at 21:16

## 2025-05-30 RX ADMIN — OXYBUTYNIN CHLORIDE 5 MG: 5 TABLET ORAL at 21:15

## 2025-05-30 RX ADMIN — POTASSIUM CHLORIDE 20 MEQ: 14.9 INJECTION, SOLUTION INTRAVENOUS at 12:52

## 2025-05-30 RX ADMIN — PIPERACILLIN SODIUM AND TAZOBACTAM SODIUM 3.38 G: 3; .375 INJECTION, SOLUTION INTRAVENOUS at 01:21

## 2025-05-30 RX ADMIN — HEPARIN SODIUM 5000 UNITS: 5000 INJECTION, SOLUTION INTRAVENOUS; SUBCUTANEOUS at 21:15

## 2025-05-30 ASSESSMENT — COGNITIVE AND FUNCTIONAL STATUS - GENERAL
MOBILITY SCORE: 17
HELP NEEDED FOR BATHING: A LOT
TURNING FROM BACK TO SIDE WHILE IN FLAT BAD: A LITTLE
TOILETING: A LOT
WALKING IN HOSPITAL ROOM: A LITTLE
DRESSING REGULAR UPPER BODY CLOTHING: A LITTLE
MOVING FROM LYING ON BACK TO SITTING ON SIDE OF FLAT BED WITH BEDRAILS: A LITTLE
MOVING TO AND FROM BED TO CHAIR: A LITTLE
DRESSING REGULAR LOWER BODY CLOTHING: A LOT
CLIMB 3 TO 5 STEPS WITH RAILING: A LOT
DAILY ACTIVITIY SCORE: 17
STANDING UP FROM CHAIR USING ARMS: A LITTLE

## 2025-05-30 ASSESSMENT — PAIN SCALES - GENERAL
PAINLEVEL_OUTOF10: 0 - NO PAIN

## 2025-05-30 NOTE — CARE PLAN
The patient's goals for the shift include Pain control    The clinical goals for the shift include pain management      Problem: Pain  Goal: Takes deep breaths with improved pain control throughout the shift  Outcome: Progressing     Problem: Pain  Goal: Turns in bed with improved pain control throughout the shift  Outcome: Progressing     Problem: Pain  Goal: Walks with improved pain control throughout the shift  Outcome: Progressing     Problem: Pain  Goal: Free from opioid side effects throughout the shift  Outcome: Progressing     Problem: Skin  Goal: Prevent/minimize sheer/friction injuries  Outcome: Progressing     Problem: Skin  Goal: Promote/optimize nutrition  Outcome: Progressing     Problem: Skin  Goal: Promote skin healing  Outcome: Progressing     Problem: Fall/Injury  Goal: Not fall by end of shift  Outcome: Progressing     Problem: Pain - Adult  Goal: Verbalizes/displays adequate comfort level or baseline comfort level  Outcome: Progressing     Problem: Discharge Planning  Goal: Discharge to home or other facility with appropriate resources  Outcome: Progressing

## 2025-05-30 NOTE — CONSULTS
"Nutrition Initial Assessment:   Nutrition Assessment    Reason for Assessment: Admission nursing screening    Patient is a 79 y.o. female presenting with hiatal hernia repair and fundoplication       Nutrition History:Pt had hiatal hernia surgery and then went back post op for a perforated viscus. She is presently on a full liquid diet and taking it in slowly. She was started on an 1800 ml fluid restriction today but the nephrologist only wants her to limit her free water due to a lower sodium. He does not want the fluid restriction to effect her full liquid diet and/or any supplement she may get.          Anthropometrics:  Height: 162.6 cm (5' 4.02\")   Weight: 73.8 kg (162 lb 9.6 oz)   BMI (Calculated): 27.9  IBW/kg (Dietitian Calculated): 54 kg  Percent of IBW: 135 %                      Weight History:   Wt Readings from Last 10 Encounters:   05/28/25 73.8 kg (162 lb 9.6 oz)   05/15/25 69 kg (152 lb 1.9 oz)   05/15/25 69.9 kg (154 lb)   04/21/25 71.2 kg (157 lb)   03/06/25 74.5 kg (164 lb 4.8 oz)   07/29/20 61.7 kg (136 lb 1.6 oz)         Weight Change %:  Weight History / % Weight Change: pt is down 11 lbs over the past 4 days which is likely a weighing error  Significant Weight Loss: No  Significant Weight Gain: Fluid related    Nutrition Focused Physical Exam Findings:    Subcutaneous Fat Loss:   Defer Subcutaneous Fat Loss Assessment: Defer all  Defer All Reason: pt was uncomfortable  Muscle Wasting:  Defer Muscle Wasting Assessment: Defer all  Defer All Reason: pt was uncomfortable  Edema:  Edema Location: some edema  Physical Findings:  Skin: Positive (pt had a hernia repair with fundopllication and then post op surgery for a perforated viscus)    Nutrition Significant Labs:  BMP Trend:   Results from last 7 days   Lab Units 05/30/25  0549 05/29/25  0557 05/28/25  0601 05/27/25  0615   GLUCOSE mg/dL 110* 105* 85 118*   CALCIUM mg/dL 8.1* 8.4* 8.3* 8.4*   SODIUM mmol/L 131* 133* 136 139   POTASSIUM mmol/L " 3.0* 3.5 3.7 3.2*   CO2 mmol/L 21 22 25 24   CHLORIDE mmol/L 100 99 103 105   BUN mg/dL 21 27* 26* 22   CREATININE mg/dL 1.30* 1.42* 1.30* 1.27*        Nutrition Specific Medications:  Norvasc; lopressor; protonix; zofran     I/O:   Last BM Date: 05/30/25; Stool Appearance: Loose (05/30/25 1700)    Dietary Orders (From admission, onward)       Start     Ordered    05/30/25 1658  Oral nutritional supplements  Until discontinued        Question Answer Comment   Deliver with All meals    Select supplement: Ensure High Protein        05/30/25 1658 05/30/25 1657  Adult diet Full Liquid  Diet effective now        Question:  Diet type  Answer:  Full Liquid    05/30/25 1658 05/21/25 1300  May Participate in Room Service  ( ROOM SERVICE MAY PARTICIPATE)  Once        Question:  .  Answer:  Yes    05/21/25 1259                     Estimated Needs:      Method for Estimating Needs: 4576-9009  28-32 price kg of IBW for surgery wound healing     Method for Estimating 24 Hour Protein Needs: 54-65  1-1.2 gm kg of IBW as long as renal function permits     Method for Estimating 24 Hour Fluid Needs: 9190-5369  20-30 ml kg of IBW as medically indicated  Patient on Order Fluid Restriction: No        Nutrition Diagnosis        Nutrition Diagnosis  Patient has Nutrition Diagnosis: Yes  Diagnosis Status (1): New  Nutrition Diagnosis 1: Increased nutrient needs  Related to (1): physiological causes  As Evidenced by (1): surgery wound healing needs       Nutrition Interventions/Recommendations   Nutrition prescription for oral nutrition    Nutrition Recommendations:  Individualized Nutrition Prescription Provided for : Continue diet level appropriate for pt's surgery,  sending ensure high protein X 3 to help meet nutritional needs.    Nutrition Interventions/Goals:   Goal: >75% of meals  Medical Food Supplement: Commercial beverage medical food supplement therapy  Goal: >75% of supplement  Coordination of Care with Providers: Nursing,  Provider      Education Documentation  No documentation found.     Not at this time       Nutrition Monitoring and Evaluation   Food/Nutrient Related History Monitoring  Monitoring and Evaluation Plan: Estimated Energy Intake, Fluid intake, Intake / amount of food  Estimated Energy Intake: Energy intake greater or equal to 75% of estimated energy needs  Fluid Intake:  (adequate fluid intake without fluid overload)  Intake / Amount of food: Consumes at least 50% or more of meals/snacks/supplements, Meets > 75% estimated energy needs    Anthropometric Measurements  Monitoring and Evaluation Plan: Body weight    Biochemical Data, Medical Tests and Procedures  Monitoring and Evaluation Plan: Electrolyte/renal panel, Glucose/endocrine profile  Criteria: improved BMP  Criteria: glucose within desired range              Time Spent (min): 45 minutes

## 2025-05-30 NOTE — PROGRESS NOTES
Infectious disease progress note  Subjective   Leukocytosis  Perforation of stomach and peritonitis  Diarrhea    Antibiotics  Zosyn day 6  Diflucan day 5  Vancomycin day 5    Objective   Range of Vitals (last 24 hours)  Heart Rate:  [79-87]   Temp:  [36.7 °C (98.1 °F)-37.2 °C (99 °F)]   Resp:  [17-18]   BP: (131-158)/(58-66)   SpO2:  [91 %-95 %]   Daily Weight  05/28/25 : 73.8 kg (162 lb 9.6 oz)    Body mass index is 27.9 kg/m².      Physical Exam  Patient still having lots of diarrhea  HEENT PERRLA  Chest entry bilaterally  CVS S1-S2 regular      Relevant Results  Labs  Lab Results   Component Value Date    WBC 19.4 (H) 05/30/2025    HGB 7.8 (L) 05/30/2025    HCT 24.6 (L) 05/30/2025    MCV 95 05/30/2025     05/30/2025     Lab Results   Component Value Date    GLUCOSE 110 (H) 05/30/2025    CALCIUM 8.1 (L) 05/30/2025     (L) 05/30/2025    K 3.0 (L) 05/30/2025    CO2 21 05/30/2025     05/30/2025    BUN 21 05/30/2025    CREATININE 1.30 (H) 05/30/2025   ESR: --  Lab Results   Component Value Date    SEDRATE 48 (H) 07/29/2020     Lab Results   Component Value Date    CRP 3.81 (A) 07/29/2020     Lab Results   Component Value Date    ALT 12 05/30/2025    AST 8 (L) 05/30/2025    ALKPHOS 156 (H) 05/30/2025    BILITOT 0.4 05/30/2025       Microbiology  5- urine culture no growth  5- stool for C. difficile was negative  5- sterile fluid collection no growth to date    Imaging  5- CT abdomen postsurgical changes related to revision of Nissen fundoplication and partial gastrectomy    Assessment/Plan   1. Zosyn will continue with Zosyn and Diflucan but will discontinue IV Vanco changed to oral Vanco empirically  2.  Will resend the stool for C. difficile spoke with infection control and also a stool pathogen which I will follow-up    Other issues  Anemia  Cataract surgery  GERD  Rheumatoid arthritis  Sarcoidosis  Sarcoma right thigh      I reviewed and interpreted all lab test  imaging studies and documentations from other healthcare providers  I am monitoring for antibiotic side effects and toxicity     June Avina MD

## 2025-05-30 NOTE — PROGRESS NOTES
"Beatriz Raymond \"Slava" is a 79 y.o. female on day 7 of admission presenting with Hiatal hernia.      Subjective     Up in chair at bedside.  Denies any new complaints.            Objective     Last Recorded Vitals  /65 (BP Location: Right arm, Patient Position: Sitting)   Pulse 80   Temp 37 °C (98.6 °F) (Temporal)   Resp 18   Wt 73.8 kg (162 lb 9.6 oz)   SpO2 95%   Intake/Output last 3 Shifts:    Intake/Output Summary (Last 24 hours) at 5/30/2025 0934  Last data filed at 5/30/2025 0933  Gross per 24 hour   Intake 2086.99 ml   Output 475 ml   Net 1611.99 ml       Admission Weight  Weight: 69 kg (152 lb 1.9 oz) (05/21/25 0901)    Daily Weight  05/28/25 : 73.8 kg (162 lb 9.6 oz)    Image Results      Physical Exam  Vitals reviewed.   Constitutional:       Appearance: Normal appearance.   HENT:      Head: Normocephalic and atraumatic.      Mouth/Throat:      Mouth: Mucous membranes are moist.   Eyes:      Conjunctiva/sclera: Conjunctivae normal.   Cardiovascular:      Rate and Rhythm: Normal rate.      Pulses: Normal pulses.   Pulmonary:      Effort: Pulmonary effort is normal.      Breath sounds: Normal breath sounds. No wheezing.   Abdominal:      General: There is no distension.      Tenderness: There is abdominal tenderness. There is no guarding.   Musculoskeletal:         General: No swelling. Normal range of motion.   Skin:     General: Skin is warm and dry.   Neurological:      General: No focal deficit present.      Mental Status: She is alert. Mental status is at baseline.   Psychiatric:         Mood and Affect: Mood normal.         Behavior: Behavior normal.         Relevant Results                    Assessment & Plan  Hiatal hernia    Sepsis without acute organ dysfunction (Multi)    Acute kidney injury    Peritonitis    Chronic bilateral low back pain    Perforated viscus      Post op hernia repair  Diarrhea  HTN  Hypokalemia  BRUCE - improved    BP stable.  Diarrhea - likely antibiotic side effect, " unlikely Cdiff as recent testing negative. OK for imodium  Leukocytosis increased today, imaging per surgery.  Antibiotics broadened by ID.  Back pain - multimodal pain control.  Currently on increased dose of metoprolol.  IV fluids per nephro, continue holding ARB.  Replace potassium  Continue prn hydralazine  Jenkins per surgery    Carin Crawford MD

## 2025-05-30 NOTE — PROGRESS NOTES
NEPHROLOGY PROGRESS NOTE    REASON FOR CONSULT: BRUCE on CKD stage III and hypertension    SUBJECTIVE:  Patient is drinking a lot more water.  She is also on Ensure.  She is not on any solid meal and is on full liquid diet.  Abdominal pain is slightly better.  Still has the diarrhea.  Has some cough    OBJECTIVE:    Visit Vitals  /66 (BP Location: Left arm, Patient Position: Lying)   Pulse 79   Temp 36.7 °C (98.1 °F)   Resp 17          Intake/Output Summary (Last 24 hours) at 5/30/2025 1215  Last data filed at 5/30/2025 1037  Gross per 24 hour   Intake 2026.99 ml   Output 475 ml   Net 1551.99 ml        General: Awake and Alert, In no distress, Cooperative  HEENT: Oral mucosa moist, EOMI  NECK: Supple  CHEST: No crackles, no wheeze, no tachypnea  CVS: S1,S2 heard, no rubs, RRR  ABD: Soft, tenderness, BS present  EXT: Bilateral lower extremity 1+ edema    MEDICATION:    Scheduled medications  Scheduled Medications[1]  Continuous medications  Continuous Medications[2]  PRN medications  PRN Medications[3]     RESULTS:    Lab Results   Component Value Date    WBC 19.4 (H) 05/30/2025    HGB 7.8 (L) 05/30/2025    HCT 24.6 (L) 05/30/2025    MCV 95 05/30/2025     05/30/2025        Lab Results   Component Value Date    CREATININE 1.30 (H) 05/30/2025    BUN 21 05/30/2025     (L) 05/30/2025    K 3.0 (L) 05/30/2025     05/30/2025    CO2 21 05/30/2025        Radiology Imaging reviewed      ASSESSMENT/PLAN:     1.  BRUCE: Serum sodium at 131 and potassium 3.  Creatinine has improved slightly to 1.3.  She is on normal saline which can be switched over to Ringer lactate.  Peak creatinine was 2.3.  Baseline creatinine is between 1.1-1.3.  Likely hemodynamic effect in the setting of Nissen's fundoplication surgery and subsequent partial gastrectomy.  Agree with changing the IV fluids to Ringer lactate.    2.  Hypertension: Blood pressure is controlled on the amlodipine 5 mg daily and metoprolol 12.5 mg twice daily  . Continue to hold losartan.    3.  CKD stage III: Baseline creatinine between 1.1-1.3.  She has longstanding history of hypertension.    4.  Hypokalemia: Potassium level at 3.  Likely due to poor nutritional status.  She is on full liquid diet.  Switch the IV fluids to LR.    5.  Hyponatremia: Secondary to free water intake and impaired free water elimination.  Advised to increase the Ensure to 3 times daily and to limit the fluid intake to around 1800 mL a day.    Thank You very much for allowing me to participate in the care of this Patient    This document was created using dragon dictation and may contain unintended error    Jono Cole MD   05/30/25              [1] amLODIPine, 5 mg, oral, Daily  fluconazole, 200 mg, intravenous, q24h  heparin (porcine), 5,000 Units, subcutaneous, q8h  lidocaine, 1 patch, transdermal, Daily  metoprolol tartrate, 12.5 mg, oral, BID  oxyBUTYnin, 5 mg, oral, TID  oxygen, , inhalation, Continuous - Inhalation  pantoprazole, 40 mg, oral, Daily before breakfast   Or  pantoprazole, 40 mg, intravenous, Daily before breakfast  piperacillin-tazobactam, 3.375 g, intravenous, q8h  potassium chloride, 20 mEq, intravenous, q2h  [Held by provider] sulfaSALAzine, 500 mg, oral, 4x daily     [2] lactated Ringer's, 50 mL/hr, Last Rate: 50 mL/hr (05/30/25 1001)     [3] PRN medications: benzocaine, bisacodyl, dextrose, dextrose, glucagon, glucagon, hydrALAZINE, HYDROmorphone, HYDROmorphone, HYDROmorphone, ondansetron **OR** ondansetron, simethicone, traMADol, vancomycin

## 2025-05-30 NOTE — PROGRESS NOTES
Vancomycin Dosing by Pharmacy- Cessation of Therapy    Consult to pharmacy for vancomycin dosing has been discontinued by the prescriber, pharmacy will sign off at this time.    Please call pharmacy if there are further questions or re-enter a consult if vancomycin is resumed.     Tiffanie Tavares, PharmD

## 2025-05-30 NOTE — PROGRESS NOTES
"Betariz Raymond \"Slava" is a 79 y.o. female on day 7 of admission presenting with Hiatal hernia.    Subjective   No acute events overnight.  Afebrile with stable hemodynamics.  Her p.o. intake is improving as is her BRUCE.  Rising leukocytosis to roughly 19,000 today from 17,000 yesterday.  Still having diarrhea, albeit improving.    Objective     Physical Exam  Physical Exam:   Constitutional: Well developed, awake/alert/oriented x3, no distress, alert and cooperative  Eyes: PERRL, EOMI, clear sclera  Head/Neck: Neck supple, no apparent injury, No JVD, trachea midline  Respiratory/Thorax: good chest expansion, thorax symmetric  Cardiovascular: Regular, rate and rhythm,  2+ equal pulses of the extremities  Gastrointestinal: Nondistended, soft, minimally tender, no rebound tenderness or guarding, no masses palpable, incisions c/d/I, ELENI with serous output  Musculoskeletal: ROM intact, no joint swelling, normal strength  Extremities: normal extremities, no cyanosis edema, contusions or wounds, no clubbing  Neurological: alert and oriented x3, intact senses,  Psychological: Appropriate mood and behavior  Skin: Warm and dry, no lesions, no rashes    Last Recorded Vitals  Blood pressure 141/65, pulse 80, temperature 37 °C (98.6 °F), temperature source Temporal, resp. rate 18, height 1.626 m (5' 4.02\"), weight 73.8 kg (162 lb 9.6 oz), SpO2 95%.  Intake/Output last 3 Shifts:  I/O last 3 completed shifts:  In: 2542.8 (34.5 mL/kg) [P.O.:477; I.V.:1365.8 (18.5 mL/kg); IV Piggyback:700]  Out: 1205 (16.3 mL/kg) [Urine:1200 (0.5 mL/kg/hr); Drains:5]  Weight: 73.8 kg     Relevant Results                              Assessment & Plan  Hiatal hernia    Perforated viscus    Sepsis without acute organ dysfunction (Multi)    Acute kidney injury    Peritonitis    Chronic bilateral low back pain    79F s/p laparoscopic HHR with mesh and Nissen fundoplication on 5/21/25, who developed a leak in the proximal portion of the fundoplication status " post diagnostic laparoscopy and partial gastrectomy, and redo fundoplication on 5/20/2025.   - Encourage PO, full liquids as tolerated  - Continue antibiotics, Zosyn and fluconazole, IV Vanc added by ID on 5/29  - Daily labs  - Mechanical and chemical DVT prophylaxis  - Avoid NSAIDs and gabapentin  - Appreciate nephrology recommendations  - Appreciate medicine recommendations  - Antiemetics as needed  - Out of bed to chair, may ambulate if she wishes  -Will obtain bilateral lower extremity venous Dopplers  - Infectious disease consultation        I spent 60 minutes in the professional and overall care of this patient.      Eliud Hutton MD

## 2025-05-30 NOTE — CARE PLAN
Problem: Pain  Goal: Takes deep breaths with improved pain control throughout the shift  Outcome: Progressing  Goal: Turns in bed with improved pain control throughout the shift  Outcome: Progressing  Goal: Walks with improved pain control throughout the shift  Outcome: Progressing  Goal: Performs ADL's with improved pain control throughout shift  Outcome: Progressing  Goal: Participates in PT with improved pain control throughout the shift  Outcome: Progressing  Goal: Free from opioid side effects throughout the shift  Outcome: Progressing  Goal: Free from acute confusion related to pain meds throughout the shift  Outcome: Progressing     Problem: Skin  Goal: Decreased wound size/increased tissue granulation at next dressing change  Outcome: Progressing  Goal: Participates in plan/prevention/treatment measures  Outcome: Progressing  Goal: Prevent/manage excess moisture  Outcome: Progressing  Goal: Prevent/minimize sheer/friction injuries  Outcome: Progressing  Goal: Promote/optimize nutrition  Outcome: Progressing  Goal: Promote skin healing  Outcome: Progressing     Problem: Fall/Injury  Goal: Not fall by end of shift  Outcome: Progressing     Problem: Pain - Adult  Goal: Verbalizes/displays adequate comfort level or baseline comfort level  Outcome: Progressing     Problem: Safety - Adult  Goal: Free from fall injury  Outcome: Progressing     Problem: Discharge Planning  Goal: Discharge to home or other facility with appropriate resources  Outcome: Progressing     Problem: Chronic Conditions and Co-morbidities  Goal: Patient's chronic conditions and co-morbidity symptoms are monitored and maintained or improved  Outcome: Progressing     Problem: Nutrition  Goal: Nutrient intake appropriate for maintaining nutritional needs  Outcome: Progressing   The patient's goals for the shift include Pain control    The clinical goals for the shift include pain management

## 2025-05-31 LAB
ALBUMIN SERPL BCP-MCNC: 2.8 G/DL (ref 3.4–5)
ALP SERPL-CCNC: 177 U/L (ref 33–136)
ALT SERPL W P-5'-P-CCNC: 10 U/L (ref 7–45)
ANION GAP SERPL CALC-SCNC: 17 MMOL/L (ref 10–20)
AST SERPL W P-5'-P-CCNC: 8 U/L (ref 9–39)
BACTERIA FLD CULT: NORMAL
BASOPHILS # BLD AUTO: 0.07 X10*3/UL (ref 0–0.1)
BASOPHILS NFR BLD AUTO: 0.3 %
BILIRUB SERPL-MCNC: 0.4 MG/DL (ref 0–1.2)
BUN SERPL-MCNC: 15 MG/DL (ref 6–23)
C COLI+JEJ+UPSA DNA STL QL NAA+PROBE: NOT DETECTED
C DIF TOX TCDA+TCDB STL QL NAA+PROBE: NOT DETECTED
CALCIUM SERPL-MCNC: 8.5 MG/DL (ref 8.6–10.3)
CHLORIDE SERPL-SCNC: 101 MMOL/L (ref 98–107)
CO2 SERPL-SCNC: 20 MMOL/L (ref 21–32)
CREAT SERPL-MCNC: 1.13 MG/DL (ref 0.5–1.05)
EC STX1 GENE STL QL NAA+PROBE: NOT DETECTED
EC STX2 GENE STL QL NAA+PROBE: NOT DETECTED
EGFRCR SERPLBLD CKD-EPI 2021: 50 ML/MIN/1.73M*2
EOSINOPHIL # BLD AUTO: 0.44 X10*3/UL (ref 0–0.4)
EOSINOPHIL NFR BLD AUTO: 2 %
ERYTHROCYTE [DISTWIDTH] IN BLOOD BY AUTOMATED COUNT: 15.3 % (ref 11.5–14.5)
GLUCOSE SERPL-MCNC: 115 MG/DL (ref 74–99)
GRAM STN SPEC: NORMAL
GRAM STN SPEC: NORMAL
HCT VFR BLD AUTO: 25.1 % (ref 36–46)
HGB BLD-MCNC: 7.8 G/DL (ref 12–16)
IMM GRANULOCYTES # BLD AUTO: 1.01 X10*3/UL (ref 0–0.5)
IMM GRANULOCYTES NFR BLD AUTO: 4.7 % (ref 0–0.9)
LYMPHOCYTES # BLD AUTO: 0.73 X10*3/UL (ref 0.8–3)
LYMPHOCYTES NFR BLD AUTO: 3.4 %
MAGNESIUM SERPL-MCNC: 1.61 MG/DL (ref 1.6–2.4)
MCH RBC QN AUTO: 29.5 PG (ref 26–34)
MCHC RBC AUTO-ENTMCNC: 31.1 G/DL (ref 32–36)
MCV RBC AUTO: 95 FL (ref 80–100)
MONOCYTES # BLD AUTO: 1.31 X10*3/UL (ref 0.05–0.8)
MONOCYTES NFR BLD AUTO: 6.1 %
NEUTROPHILS # BLD AUTO: 18.08 X10*3/UL (ref 1.6–5.5)
NEUTROPHILS NFR BLD AUTO: 83.5 %
NOROVIRUS GI + GII RNA STL NAA+PROBE: NOT DETECTED
NRBC BLD-RTO: 0.1 /100 WBCS (ref 0–0)
PHOSPHATE SERPL-MCNC: 2.6 MG/DL (ref 2.5–4.9)
PLATELET # BLD AUTO: 466 X10*3/UL (ref 150–450)
POTASSIUM SERPL-SCNC: 3.4 MMOL/L (ref 3.5–5.3)
PROT SERPL-MCNC: 5.7 G/DL (ref 6.4–8.2)
RBC # BLD AUTO: 2.64 X10*6/UL (ref 4–5.2)
RV RNA STL NAA+PROBE: NOT DETECTED
SALMONELLA DNA STL QL NAA+PROBE: NOT DETECTED
SHIGELLA DNA SPEC QL NAA+PROBE: NOT DETECTED
SODIUM SERPL-SCNC: 135 MMOL/L (ref 136–145)
V CHOLERAE DNA STL QL NAA+PROBE: NOT DETECTED
WBC # BLD AUTO: 21.6 X10*3/UL (ref 4.4–11.3)
Y ENTEROCOL DNA STL QL NAA+PROBE: NOT DETECTED

## 2025-05-31 PROCEDURE — 36415 COLL VENOUS BLD VENIPUNCTURE: CPT | Performed by: SURGERY

## 2025-05-31 PROCEDURE — 99024 POSTOP FOLLOW-UP VISIT: CPT | Performed by: STUDENT IN AN ORGANIZED HEALTH CARE EDUCATION/TRAINING PROGRAM

## 2025-05-31 PROCEDURE — 2500000005 HC RX 250 GENERAL PHARMACY W/O HCPCS: Performed by: SURGERY

## 2025-05-31 PROCEDURE — 99232 SBSQ HOSP IP/OBS MODERATE 35: CPT | Performed by: STUDENT IN AN ORGANIZED HEALTH CARE EDUCATION/TRAINING PROGRAM

## 2025-05-31 PROCEDURE — 2500000002 HC RX 250 W HCPCS SELF ADMINISTERED DRUGS (ALT 637 FOR MEDICARE OP, ALT 636 FOR OP/ED): Performed by: INTERNAL MEDICINE

## 2025-05-31 PROCEDURE — 2500000001 HC RX 250 WO HCPCS SELF ADMINISTERED DRUGS (ALT 637 FOR MEDICARE OP): Performed by: SURGERY

## 2025-05-31 PROCEDURE — 85025 COMPLETE CBC W/AUTO DIFF WBC: CPT | Performed by: SURGERY

## 2025-05-31 PROCEDURE — 2500000001 HC RX 250 WO HCPCS SELF ADMINISTERED DRUGS (ALT 637 FOR MEDICARE OP): Performed by: INTERNAL MEDICINE

## 2025-05-31 PROCEDURE — 83735 ASSAY OF MAGNESIUM: CPT | Performed by: SURGERY

## 2025-05-31 PROCEDURE — 2500000004 HC RX 250 GENERAL PHARMACY W/ HCPCS (ALT 636 FOR OP/ED): Mod: JZ | Performed by: SURGERY

## 2025-05-31 PROCEDURE — 1100000001 HC PRIVATE ROOM DAILY

## 2025-05-31 PROCEDURE — 84100 ASSAY OF PHOSPHORUS: CPT | Performed by: SURGERY

## 2025-05-31 PROCEDURE — 2500000002 HC RX 250 W HCPCS SELF ADMINISTERED DRUGS (ALT 637 FOR MEDICARE OP, ALT 636 FOR OP/ED): Performed by: SURGERY

## 2025-05-31 PROCEDURE — 2500000001 HC RX 250 WO HCPCS SELF ADMINISTERED DRUGS (ALT 637 FOR MEDICARE OP): Performed by: STUDENT IN AN ORGANIZED HEALTH CARE EDUCATION/TRAINING PROGRAM

## 2025-05-31 PROCEDURE — 80053 COMPREHEN METABOLIC PANEL: CPT | Performed by: SURGERY

## 2025-05-31 PROCEDURE — 2500000004 HC RX 250 GENERAL PHARMACY W/ HCPCS (ALT 636 FOR OP/ED): Mod: JZ | Performed by: STUDENT IN AN ORGANIZED HEALTH CARE EDUCATION/TRAINING PROGRAM

## 2025-05-31 RX ORDER — POTASSIUM CHLORIDE 20 MEQ/1
20 TABLET, EXTENDED RELEASE ORAL ONCE
Status: DISCONTINUED | OUTPATIENT
Start: 2025-05-31 | End: 2025-05-31

## 2025-05-31 RX ORDER — POTASSIUM CHLORIDE 1.5 G/1.58G
20 POWDER, FOR SOLUTION ORAL ONCE
Status: COMPLETED | OUTPATIENT
Start: 2025-05-31 | End: 2025-05-31

## 2025-05-31 RX ORDER — MAGNESIUM SULFATE HEPTAHYDRATE 40 MG/ML
2 INJECTION, SOLUTION INTRAVENOUS ONCE
Status: COMPLETED | OUTPATIENT
Start: 2025-05-31 | End: 2025-05-31

## 2025-05-31 RX ORDER — POTASSIUM CHLORIDE 14.9 MG/ML
20 INJECTION INTRAVENOUS ONCE
Status: COMPLETED | OUTPATIENT
Start: 2025-05-31 | End: 2025-05-31

## 2025-05-31 RX ADMIN — AMLODIPINE BESYLATE 5 MG: 10 TABLET ORAL at 08:33

## 2025-05-31 RX ADMIN — PIPERACILLIN SODIUM AND TAZOBACTAM SODIUM 3.38 G: 3; .375 INJECTION, SOLUTION INTRAVENOUS at 10:11

## 2025-05-31 RX ADMIN — HEPARIN SODIUM 5000 UNITS: 5000 INJECTION, SOLUTION INTRAVENOUS; SUBCUTANEOUS at 14:45

## 2025-05-31 RX ADMIN — PANTOPRAZOLE SODIUM 40 MG: 40 TABLET, DELAYED RELEASE ORAL at 05:41

## 2025-05-31 RX ADMIN — METOPROLOL TARTRATE 12.5 MG: 25 TABLET, FILM COATED ORAL at 20:58

## 2025-05-31 RX ADMIN — MAGNESIUM SULFATE HEPTAHYDRATE 2 G: 40 INJECTION, SOLUTION INTRAVENOUS at 14:45

## 2025-05-31 RX ADMIN — Medication 2 L/MIN: at 21:16

## 2025-05-31 RX ADMIN — PIPERACILLIN SODIUM AND TAZOBACTAM SODIUM 3.38 G: 3; .375 INJECTION, SOLUTION INTRAVENOUS at 18:50

## 2025-05-31 RX ADMIN — POTASSIUM CHLORIDE 20 MEQ: 1.5 POWDER, FOR SOLUTION ORAL at 11:24

## 2025-05-31 RX ADMIN — OXYBUTYNIN CHLORIDE 5 MG: 5 TABLET ORAL at 21:30

## 2025-05-31 RX ADMIN — LIDOCAINE 4% 1 PATCH: 40 PATCH TOPICAL at 08:32

## 2025-05-31 RX ADMIN — OXYBUTYNIN CHLORIDE 5 MG: 5 TABLET ORAL at 08:33

## 2025-05-31 RX ADMIN — HEPARIN SODIUM 5000 UNITS: 5000 INJECTION, SOLUTION INTRAVENOUS; SUBCUTANEOUS at 05:41

## 2025-05-31 RX ADMIN — FLUCONAZOLE 200 MG: 2 INJECTION, SOLUTION INTRAVENOUS at 17:47

## 2025-05-31 RX ADMIN — HEPARIN SODIUM 5000 UNITS: 5000 INJECTION, SOLUTION INTRAVENOUS; SUBCUTANEOUS at 20:59

## 2025-05-31 RX ADMIN — PIPERACILLIN SODIUM AND TAZOBACTAM SODIUM 3.38 G: 3; .375 INJECTION, SOLUTION INTRAVENOUS at 02:15

## 2025-05-31 RX ADMIN — POTASSIUM CHLORIDE 20 MEQ: 14.9 INJECTION, SOLUTION INTRAVENOUS at 20:58

## 2025-05-31 RX ADMIN — METOPROLOL TARTRATE 12.5 MG: 25 TABLET, FILM COATED ORAL at 08:33

## 2025-05-31 RX ADMIN — VANCOMYCIN HYDROCHLORIDE 125 MG: 125 CAPSULE ORAL at 05:40

## 2025-05-31 RX ADMIN — OXYBUTYNIN CHLORIDE 5 MG: 5 TABLET ORAL at 14:45

## 2025-05-31 ASSESSMENT — PAIN SCALES - GENERAL
PAINLEVEL_OUTOF10: 0 - NO PAIN
PAINLEVEL_OUTOF10: 0 - NO PAIN

## 2025-05-31 ASSESSMENT — PAIN - FUNCTIONAL ASSESSMENT: PAIN_FUNCTIONAL_ASSESSMENT: 0-10

## 2025-05-31 NOTE — PROGRESS NOTES
Infectious disease progress note  Subjective   Leukocytosis  Perforation of stomach and peritonitis  Diarrhea    Antibiotics  Zosyn day 7  Diflucan day 6  Vancomycin oral discontinued    Objective   Range of Vitals (last 24 hours)  Heart Rate:  [81-98]   Temp:  [36.7 °C (98.1 °F)-37.3 °C (99.1 °F)]   Resp:  [14-18]   BP: (153-172)/(64-70)   Weight:  [69.4 kg (153 lb)]   SpO2:  [92 %-95 %]   Daily Weight  05/31/25 : 69.4 kg (153 lb)    Body mass index is 26.25 kg/m².      Physical Exam        Relevant Results  Labs  Lab Results   Component Value Date    WBC 21.6 (H) 05/31/2025    HGB 7.8 (L) 05/31/2025    HCT 25.1 (L) 05/31/2025    MCV 95 05/31/2025     (H) 05/31/2025     Lab Results   Component Value Date    GLUCOSE 115 (H) 05/31/2025    CALCIUM 8.5 (L) 05/31/2025     (L) 05/31/2025    K 3.4 (L) 05/31/2025    CO2 20 (L) 05/31/2025     05/31/2025    BUN 15 05/31/2025    CREATININE 1.13 (H) 05/31/2025   ESR: --  Lab Results   Component Value Date    SEDRATE 48 (H) 07/29/2020     Lab Results   Component Value Date    CRP 3.81 (A) 07/29/2020     Lab Results   Component Value Date    ALT 10 05/31/2025    AST 8 (L) 05/31/2025    ALKPHOS 177 (H) 05/31/2025    BILITOT 0.4 05/31/2025       Microbiology  5- urine culture no growth  5- stool for C. difficile was negative  5- sterile fluid collection no growth to date     Imaging  5- CT abdomen postsurgical changes related to revision of Nissen fundoplication and partial gastrectomy       Assessment/Plan   1.  Continue with Zosyn daptomycin Diflucan but will discontinue oral Vanco since C. difficile PCR once again negative and stool pathogen's were ruled out    Other issues  Anemia  Cataract surgery  GERD  Rheumatoid arthritis  Sarcoidosis  Sarcoma right thigh    I reviewed and interpreted all lab test imaging studies and documentations from other healthcare providers  I am monitoring for antibiotic side effects and toxicity      June Avina MD

## 2025-05-31 NOTE — CARE PLAN
The patient's goals for the shift include   Problem: Pain  Goal: Takes deep breaths with improved pain control throughout the shift  Outcome: Progressing  Goal: Turns in bed with improved pain control throughout the shift  Outcome: Progressing  Goal: Walks with improved pain control throughout the shift  Outcome: Progressing  Goal: Performs ADL's with improved pain control throughout shift  Outcome: Progressing  Goal: Participates in PT with improved pain control throughout the shift  Outcome: Progressing  Goal: Free from opioid side effects throughout the shift  Outcome: Progressing  Goal: Free from acute confusion related to pain meds throughout the shift  Outcome: Progressing         Problem: Fall/Injury  Goal: Not fall by end of shift  Outcome: Progressing     Problem: Pain - Adult  Goal: Verbalizes/displays adequate comfort level or baseline comfort level  Outcome: Progressing

## 2025-05-31 NOTE — PROGRESS NOTES
"Beatriz Raymond \"Slava" is a 79 y.o. female on day 8 of admission presenting with Hiatal hernia.    Subjective   With mild lower abdominal cramping    Objective   Last Recorded Vitals  /66 (BP Location: Right arm, Patient Position: Sitting)   Pulse 89   Temp 36.7 °C (98.1 °F) (Temporal)   Resp 18   Wt 69.4 kg (153 lb)   SpO2 93%     G: aox3, NAD, cooperative  HENT: neck supple, no JVD  Eyes: clear sclera  CV: RRR s1 s2  L: lower abdominal tenderness, drain   Abd: soft, NT, non distended  Ext: no c/c/e  N: no appreciable acute focal deficits  Psych: appropriate mood and behavior     Assessment & Plan    Post op hernia repair, peritonitis   Diarrhea  HTN  Hypokalemia  BRUCE - improved  Anemia  DVT ppx    Plan:  - Leukocytosis uptrending, no fevers, IV abx per ID, Cdiff negative  - Pain control per primary surgery  - Cr improved, nephrology following, continue to hold ARB  - Continue norvasc and metoprolol at current doses    Andreas Hidalgo, DO      "

## 2025-05-31 NOTE — PROGRESS NOTES
"Beatriz Raymond \"Slava" is a 79 y.o. female on day 8 of admission presenting with Hiatal hernia.    Subjective   Patient seen this morning.  She feels better.  Has some abdominal pain, no nausea or vomiting.  Diarrhea is improved compared to yesterday.  C. difficile testing came back negative.  White blood cell count still rising.     Objective     Physical Exam  In no acute distress  No respiratory distress  Abdomen is softly distended, mildly diffusely tender  ELENI drain with scant serosanguineous output  Incisions are clean dry and intact  Alert and oriented x 3    Last Recorded Vitals  Blood pressure 159/70, pulse 89, temperature 37.2 °C (99 °F), temperature source Temporal, resp. rate 18, height 1.626 m (5' 4.02\"), weight 69.4 kg (153 lb), SpO2 95%.    Intake/Output last 3 Shifts:  I/O last 3 completed shifts:  In: 3692.8 (50.1 mL/kg) [P.O.:1752; I.V.:1440.8 (19.5 mL/kg); IV Piggyback:500]  Out: 1285 (17.4 mL/kg) [Urine:1275 (0.5 mL/kg/hr); Drains:10]  Weight: 73.8 kg     Assessment: 79F s/p laparoscopic HHR with mesh and Nissen fundoplication on 5/21/25, who developed a leak in the proximal portion of the fundoplication status post diagnostic laparoscopy and partial gastrectomy, and redo fundoplication on 5/20/2025.    Plan:  - Encourage PO, full liquids as tolerated  - Continue antibiotics, Zosyn and fluconazole, IV Vanc added by ID on 5/29  - Daily labs  - Mechanical and chemical DVT prophylaxis  - Would need additional imaging to characterize the cystic structure in the right popliteal fossa  - Avoid NSAIDs and gabapentin  - Appreciate nephrology recommendations  - Appreciate medicine recommendations  - Antiemetics as needed  - Out of bed to chair, may ambulate  - Infectious disease consultation    I spent 60 minutes in the professional and overall care of this patient.      Addy Khalil MD  "

## 2025-06-01 VITALS
SYSTOLIC BLOOD PRESSURE: 155 MMHG | BODY MASS INDEX: 26.12 KG/M2 | DIASTOLIC BLOOD PRESSURE: 72 MMHG | OXYGEN SATURATION: 95 % | RESPIRATION RATE: 16 BRPM | HEART RATE: 88 BPM | HEIGHT: 64 IN | WEIGHT: 153 LBS | TEMPERATURE: 100.2 F

## 2025-06-01 LAB
ALBUMIN SERPL BCP-MCNC: 2.5 G/DL (ref 3.4–5)
ALP SERPL-CCNC: 153 U/L (ref 33–136)
ALT SERPL W P-5'-P-CCNC: 8 U/L (ref 7–45)
ANION GAP SERPL CALC-SCNC: 15 MMOL/L (ref 10–20)
AST SERPL W P-5'-P-CCNC: 7 U/L (ref 9–39)
BASOPHILS # BLD AUTO: 0.06 X10*3/UL (ref 0–0.1)
BASOPHILS NFR BLD AUTO: 0.3 %
BILIRUB SERPL-MCNC: 0.4 MG/DL (ref 0–1.2)
BUN SERPL-MCNC: 12 MG/DL (ref 6–23)
CALCIUM SERPL-MCNC: 8.3 MG/DL (ref 8.6–10.3)
CHLORIDE SERPL-SCNC: 103 MMOL/L (ref 98–107)
CO2 SERPL-SCNC: 23 MMOL/L (ref 21–32)
CREAT SERPL-MCNC: 1.11 MG/DL (ref 0.5–1.05)
EGFRCR SERPLBLD CKD-EPI 2021: 51 ML/MIN/1.73M*2
EOSINOPHIL # BLD AUTO: 0.35 X10*3/UL (ref 0–0.4)
EOSINOPHIL NFR BLD AUTO: 1.7 %
ERYTHROCYTE [DISTWIDTH] IN BLOOD BY AUTOMATED COUNT: 15.4 % (ref 11.5–14.5)
GLUCOSE SERPL-MCNC: 117 MG/DL (ref 74–99)
HCT VFR BLD AUTO: 22.7 % (ref 36–46)
HGB BLD-MCNC: 7.2 G/DL (ref 12–16)
IMM GRANULOCYTES # BLD AUTO: 0.63 X10*3/UL (ref 0–0.5)
IMM GRANULOCYTES NFR BLD AUTO: 3 % (ref 0–0.9)
LYMPHOCYTES # BLD AUTO: 0.76 X10*3/UL (ref 0.8–3)
LYMPHOCYTES NFR BLD AUTO: 3.6 %
MAGNESIUM SERPL-MCNC: 2.04 MG/DL (ref 1.6–2.4)
MCH RBC QN AUTO: 30 PG (ref 26–34)
MCHC RBC AUTO-ENTMCNC: 31.7 G/DL (ref 32–36)
MCV RBC AUTO: 95 FL (ref 80–100)
MONOCYTES # BLD AUTO: 1.35 X10*3/UL (ref 0.05–0.8)
MONOCYTES NFR BLD AUTO: 6.5 %
NEUTROPHILS # BLD AUTO: 17.7 X10*3/UL (ref 1.6–5.5)
NEUTROPHILS NFR BLD AUTO: 84.9 %
NRBC BLD-RTO: 0 /100 WBCS (ref 0–0)
PHOSPHATE SERPL-MCNC: 2.7 MG/DL (ref 2.5–4.9)
PLATELET # BLD AUTO: 502 X10*3/UL (ref 150–450)
POTASSIUM SERPL-SCNC: 3.8 MMOL/L (ref 3.5–5.3)
PROT SERPL-MCNC: 5.3 G/DL (ref 6.4–8.2)
RBC # BLD AUTO: 2.4 X10*6/UL (ref 4–5.2)
SODIUM SERPL-SCNC: 137 MMOL/L (ref 136–145)
WBC # BLD AUTO: 20.9 X10*3/UL (ref 4.4–11.3)

## 2025-06-01 PROCEDURE — 99024 POSTOP FOLLOW-UP VISIT: CPT | Performed by: STUDENT IN AN ORGANIZED HEALTH CARE EDUCATION/TRAINING PROGRAM

## 2025-06-01 PROCEDURE — 83735 ASSAY OF MAGNESIUM: CPT | Performed by: SURGERY

## 2025-06-01 PROCEDURE — 36415 COLL VENOUS BLD VENIPUNCTURE: CPT | Performed by: SURGERY

## 2025-06-01 PROCEDURE — 2500000001 HC RX 250 WO HCPCS SELF ADMINISTERED DRUGS (ALT 637 FOR MEDICARE OP): Performed by: SURGERY

## 2025-06-01 PROCEDURE — 2500000001 HC RX 250 WO HCPCS SELF ADMINISTERED DRUGS (ALT 637 FOR MEDICARE OP): Performed by: INTERNAL MEDICINE

## 2025-06-01 PROCEDURE — 2500000001 HC RX 250 WO HCPCS SELF ADMINISTERED DRUGS (ALT 637 FOR MEDICARE OP): Performed by: STUDENT IN AN ORGANIZED HEALTH CARE EDUCATION/TRAINING PROGRAM

## 2025-06-01 PROCEDURE — 2500000005 HC RX 250 GENERAL PHARMACY W/O HCPCS: Performed by: SURGERY

## 2025-06-01 PROCEDURE — 2500000002 HC RX 250 W HCPCS SELF ADMINISTERED DRUGS (ALT 637 FOR MEDICARE OP, ALT 636 FOR OP/ED): Performed by: SURGERY

## 2025-06-01 PROCEDURE — 1100000001 HC PRIVATE ROOM DAILY

## 2025-06-01 PROCEDURE — 80053 COMPREHEN METABOLIC PANEL: CPT | Performed by: SURGERY

## 2025-06-01 PROCEDURE — 2500000004 HC RX 250 GENERAL PHARMACY W/ HCPCS (ALT 636 FOR OP/ED): Mod: JZ | Performed by: SURGERY

## 2025-06-01 PROCEDURE — 85025 COMPLETE CBC W/AUTO DIFF WBC: CPT | Performed by: SURGERY

## 2025-06-01 PROCEDURE — 84100 ASSAY OF PHOSPHORUS: CPT | Performed by: SURGERY

## 2025-06-01 PROCEDURE — 99232 SBSQ HOSP IP/OBS MODERATE 35: CPT | Performed by: INTERNAL MEDICINE

## 2025-06-01 RX ORDER — LOPERAMIDE HYDROCHLORIDE 2 MG/1
2 CAPSULE ORAL 4 TIMES DAILY PRN
Status: DISCONTINUED | OUTPATIENT
Start: 2025-06-01 | End: 2025-06-06 | Stop reason: HOSPADM

## 2025-06-01 RX ORDER — DEXTROMETHORPHAN/PSEUDOEPHED 2.5-7.5/.8
40 DROPS ORAL 4 TIMES DAILY
Status: DISCONTINUED | OUTPATIENT
Start: 2025-06-01 | End: 2025-06-06 | Stop reason: HOSPADM

## 2025-06-01 RX ADMIN — SIMETHICONE 40 MG: 20 SUSPENSION/ DROPS ORAL at 12:27

## 2025-06-01 RX ADMIN — METOPROLOL TARTRATE 12.5 MG: 25 TABLET, FILM COATED ORAL at 20:51

## 2025-06-01 RX ADMIN — HEPARIN SODIUM 5000 UNITS: 5000 INJECTION, SOLUTION INTRAVENOUS; SUBCUTANEOUS at 21:17

## 2025-06-01 RX ADMIN — OXYBUTYNIN CHLORIDE 5 MG: 5 TABLET ORAL at 09:36

## 2025-06-01 RX ADMIN — FLUCONAZOLE 200 MG: 2 INJECTION, SOLUTION INTRAVENOUS at 17:00

## 2025-06-01 RX ADMIN — HEPARIN SODIUM 5000 UNITS: 5000 INJECTION, SOLUTION INTRAVENOUS; SUBCUTANEOUS at 06:13

## 2025-06-01 RX ADMIN — METOPROLOL TARTRATE 12.5 MG: 25 TABLET, FILM COATED ORAL at 09:37

## 2025-06-01 RX ADMIN — PIPERACILLIN SODIUM AND TAZOBACTAM SODIUM 3.38 G: 3; .375 INJECTION, SOLUTION INTRAVENOUS at 01:48

## 2025-06-01 RX ADMIN — AMLODIPINE BESYLATE 5 MG: 10 TABLET ORAL at 09:37

## 2025-06-01 RX ADMIN — OXYBUTYNIN CHLORIDE 5 MG: 5 TABLET ORAL at 14:51

## 2025-06-01 RX ADMIN — LOPERAMIDE HYDROCHLORIDE 2 MG: 2 CAPSULE ORAL at 12:26

## 2025-06-01 RX ADMIN — SIMETHICONE 40 MG: 20 SUSPENSION/ DROPS ORAL at 17:00

## 2025-06-01 RX ADMIN — PANTOPRAZOLE SODIUM 40 MG: 40 TABLET, DELAYED RELEASE ORAL at 06:13

## 2025-06-01 RX ADMIN — HEPARIN SODIUM 5000 UNITS: 5000 INJECTION, SOLUTION INTRAVENOUS; SUBCUTANEOUS at 14:51

## 2025-06-01 RX ADMIN — LIDOCAINE 4% 1 PATCH: 40 PATCH TOPICAL at 09:36

## 2025-06-01 RX ADMIN — PIPERACILLIN SODIUM AND TAZOBACTAM SODIUM 3.38 G: 3; .375 INJECTION, SOLUTION INTRAVENOUS at 20:50

## 2025-06-01 RX ADMIN — SODIUM CHLORIDE, SODIUM LACTATE, POTASSIUM CHLORIDE, AND CALCIUM CHLORIDE 50 ML/HR: .6; .31; .03; .02 INJECTION, SOLUTION INTRAVENOUS at 06:10

## 2025-06-01 RX ADMIN — PIPERACILLIN SODIUM AND TAZOBACTAM SODIUM 3.38 G: 3; .375 INJECTION, SOLUTION INTRAVENOUS at 09:39

## 2025-06-01 RX ADMIN — SIMETHICONE 40 MG: 20 SUSPENSION/ DROPS ORAL at 20:51

## 2025-06-01 RX ADMIN — OXYBUTYNIN CHLORIDE 5 MG: 5 TABLET ORAL at 20:51

## 2025-06-01 ASSESSMENT — PAIN SCALES - GENERAL
PAINLEVEL_OUTOF10: 1
PAINLEVEL_OUTOF10: 0 - NO PAIN

## 2025-06-01 ASSESSMENT — PAIN SCALES - WONG BAKER: WONGBAKER_NUMERICALRESPONSE: NO HURT

## 2025-06-01 NOTE — CARE PLAN
The patient's goals for the shift include Pain control    The clinical goals for the shift include pt's pain level will be within tolerable limits/ pt's diarrhea will resolve    Problem: Pain  Goal: Takes deep breaths with improved pain control throughout the shift  5/31/2025 2303 by Latasha Kelley RN  Outcome: Progressing  5/31/2025 2303 by Latasha Kelley RN  Outcome: Progressing  Goal: Turns in bed with improved pain control throughout the shift  5/31/2025 2303 by Latasha Kelley RN  Outcome: Progressing  5/31/2025 2303 by Latasha Kelley RN  Outcome: Progressing  Goal: Walks with improved pain control throughout the shift  5/31/2025 2303 by Latasha Kelley RN  Outcome: Progressing  5/31/2025 2303 by Latasha Kelley RN  Outcome: Progressing  Goal: Performs ADL's with improved pain control throughout shift  5/31/2025 2303 by Latasha Kelley RN  Outcome: Progressing  5/31/2025 2303 by Latasha Kelley RN  Outcome: Progressing  Goal: Participates in PT with improved pain control throughout the shift  5/31/2025 2303 by Latasha Kelley RN  Outcome: Progressing  5/31/2025 2303 by Latasha Kelley RN  Outcome: Progressing  Goal: Free from opioid side effects throughout the shift  5/31/2025 2303 by Latasha Kelley RN  Outcome: Progressing  5/31/2025 2303 by Latasha Kelley RN  Outcome: Progressing  Goal: Free from acute confusion related to pain meds throughout the shift  5/31/2025 2303 by Latasha Kelley RN  Outcome: Progressing  5/31/2025 2303 by Latasha Kelley RN  Outcome: Progressing     Problem: Skin  Goal: Decreased wound size/increased tissue granulation at next dressing change  5/31/2025 2303 by Latasha Kelley RN  Outcome: Progressing  5/31/2025 2303 by Latasha Kelley RN  Outcome: Progressing  Goal: Participates in plan/prevention/treatment measures  5/31/2025 2303 by Latasha Kelley RN  Outcome: Progressing  5/31/2025 2303 by Latasha Kelley RN  Outcome: Progressing  Goal: Prevent/manage excess  moisture  5/31/2025 2303 by Latasha Kelley RN  Outcome: Progressing  5/31/2025 2303 by Latasha Kelley RN  Outcome: Progressing  Goal: Prevent/minimize sheer/friction injuries  5/31/2025 2303 by Latasha Kelley RN  Outcome: Progressing  5/31/2025 2303 by Latasha Kelley RN  Outcome: Progressing  Goal: Promote/optimize nutrition  5/31/2025 2303 by Latasha Kelley RN  Outcome: Progressing  5/31/2025 2303 by Latasha Kelley RN  Outcome: Progressing  Goal: Promote skin healing  5/31/2025 2303 by Latasha Kelley RN  Outcome: Progressing  5/31/2025 2303 by Latasha Kelley RN  Outcome: Progressing     Problem: Fall/Injury  Goal: Not fall by end of shift  5/31/2025 2303 by Latasha Kelley RN  Outcome: Progressing  5/31/2025 2303 by Latasha Kelley RN  Outcome: Progressing     Problem: Pain - Adult  Goal: Verbalizes/displays adequate comfort level or baseline comfort level  5/31/2025 2303 by Latasha Kelley RN  Outcome: Progressing  5/31/2025 2303 by Latasha Kelley RN  Outcome: Progressing     Problem: Safety - Adult  Goal: Free from fall injury  5/31/2025 2303 by Latasha Kelley RN  Outcome: Progressing  5/31/2025 2303 by Latasha Kelley RN  Outcome: Progressing     Problem: Discharge Planning  Goal: Discharge to home or other facility with appropriate resources  5/31/2025 2303 by Latasha Kelley RN  Outcome: Progressing  5/31/2025 2303 by Latasha Kelley RN  Outcome: Progressing     Problem: Chronic Conditions and Co-morbidities  Goal: Patient's chronic conditions and co-morbidity symptoms are monitored and maintained or improved  5/31/2025 2303 by Latasha Kelley RN  Outcome: Progressing  5/31/2025 2303 by Latasha Kelley RN  Outcome: Progressing     Problem: Nutrition  Goal: Nutrient intake appropriate for maintaining nutritional needs  5/31/2025 2303 by Latasha Kelley RN  Outcome: Progressing  5/31/2025 2303 by Latasha Kelley RN  Outcome: Progressing

## 2025-06-01 NOTE — CARE PLAN
The patient's goals for the shift include Pain control    The clinical goals for the shift include pt's pain level will be within tolerable limits/ pt's diarrhea will resolve

## 2025-06-01 NOTE — CARE PLAN
The patient's goals for the shift include Pain control    The clinical goals for the shift include   Problem: Pain  Goal: Takes deep breaths with improved pain control throughout the shift  Outcome: Progressing  Goal: Turns in bed with improved pain control throughout the shift  Outcome: Progressing  Goal: Walks with improved pain control throughout the shift  Outcome: Progressing  Goal: Performs ADL's with improved pain control throughout shift  Outcome: Progressing  Goal: Participates in PT with improved pain control throughout the shift  Outcome: Progressing  Goal: Free from opioid side effects throughout the shift  Outcome: Progressing  Goal: Free from acute confusion related to pain meds throughout the shift  Outcome: Progressing     Problem: Discharge Planning  Goal: Discharge to home or other facility with appropriate resources  Outcome: Progressing     Problem: Safety - Adult  Goal: Free from fall injury  Outcome: Progressing     Problem: Chronic Conditions and Co-morbidities  Goal: Patient's chronic conditions and co-morbidity symptoms are monitored and maintained or improved  Outcome: Progressing     Problem: Fall/Injury  Goal: Not fall by end of shift  Outcome: Progressing

## 2025-06-01 NOTE — PROGRESS NOTES
"Beatriz Raymond \"Slava" is a 79 y.o. female on day 9 of admission presenting with Hiatal hernia.    Subjective   Up in chair.  No complaints this am.    Objective   Last Recorded Vitals  /67 (BP Location: Right arm, Patient Position: Lying)   Pulse 86   Temp 37.3 °C (99.1 °F) (Temporal)   Resp 18   Wt 69.4 kg (153 lb)   SpO2 97%     G: aox3, NAD, cooperative  HENT: neck supple, no JVD  Eyes: clear sclera  CV: RRR s1 s2  L: lower abdominal tenderness  Abd: soft, NT, non distended  Ext: no  pitting edema  N: no appreciable acute focal deficits  Psych: appropriate mood and behavior     Assessment & Plan    Post op hernia repair, peritonitis   Diarrhea  HTN  Hypokalemia  BRUCE - improved  Anemia  DVT ppx    Plan:  - WBC stable - continue abx per ID.  Cdiff negative, denies fevers, chills  - Pain control per primary surgery  - Cr improved, nephrology following, continue to hold ARB  - hyponatremia - resolved, off fluid restriction  - Continue norvasc and metoprolol at current doses, BP control is stable.    Cuauhtemoc Armijo MD      "

## 2025-06-01 NOTE — PROGRESS NOTES
"Beatriz Raymond \"Slava" is a 79 y.o. female on day 9 of admission presenting with Hiatal hernia.    Subjective    Patient seen this morning.  Still has lower abdominal pain.  Pain is not worse compared to yesterday.  Still has diarrhea.  No nausea or vomiting.  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    Objective   Physical Exam  In no acute distress  No respiratory distress  Abdomen is softly distended, mildly diffusely tender  ELENI drain with scant serosanguineous output  Incisions are clean dry and intact  Alert and oriented x 3    Last Recorded Vitals  Blood pressure 158/67, pulse 86, temperature 37.3 °C (99.1 °F), temperature source Temporal, resp. rate 18, height 1.626 m (5' 4.02\"), weight 69.4 kg (153 lb), SpO2 97%.    Intake/Output last 3 Shifts:  I/O last 3 completed shifts:  In: 2649.8 (38.2 mL/kg) [P.O.:934; I.V.:1515.8 (21.8 mL/kg); IV Piggyback:200]  Out: 1000 (14.4 mL/kg) [Urine:1000 (0.4 mL/kg/hr)]  Weight: 69.4 kg     Assessment: 79F s/p laparoscopic HHR with mesh and Nissen fundoplication on 5/21/25, who developed a leak in the proximal portion of the fundoplication status post diagnostic laparoscopy and partial gastrectomy, and redo fundoplication on 5/20/2025.  Patient has been having episodes of diarrhea.  She has a leukocytosis which has slightly trended downwards.     Plan:  - Encourage PO, full liquids as tolerated  - Continue antibiotics, Zosyn and fluconazole, IV Vanc added by ID on 5/29  - Daily labs  - Mechanical and chemical DVT prophylaxis  - Avoid NSAIDs and gabapentin  - Appreciate nephrology recommendations  - Appreciate medicine recommendations  - Antiemetics as needed  - Out of bed to chair, may ambulate  - If leukocytosis persists we will get a CT scan of the abdomen and pelvis with oral and IV contrast tomorrow    I spent 30 minutes in the " professional and overall care of this patient.      Addy Khalil MD

## 2025-06-02 ENCOUNTER — APPOINTMENT (OUTPATIENT)
Dept: RADIOLOGY | Facility: HOSPITAL | Age: 80
End: 2025-06-02
Payer: MEDICARE

## 2025-06-02 LAB
ALBUMIN SERPL BCP-MCNC: 2.5 G/DL (ref 3.4–5)
ALP SERPL-CCNC: 149 U/L (ref 33–136)
ALT SERPL W P-5'-P-CCNC: 9 U/L (ref 7–45)
ANION GAP SERPL CALC-SCNC: 14 MMOL/L (ref 10–20)
AST SERPL W P-5'-P-CCNC: 9 U/L (ref 9–39)
BACTERIA FLD CULT: NORMAL
BASOPHILS # BLD AUTO: 0.05 X10*3/UL (ref 0–0.1)
BASOPHILS NFR BLD AUTO: 0.2 %
BILIRUB SERPL-MCNC: 0.5 MG/DL (ref 0–1.2)
BUN SERPL-MCNC: 12 MG/DL (ref 6–23)
CALCIUM SERPL-MCNC: 8.3 MG/DL (ref 8.6–10.3)
CHLORIDE SERPL-SCNC: 101 MMOL/L (ref 98–107)
CO2 SERPL-SCNC: 25 MMOL/L (ref 21–32)
CREAT SERPL-MCNC: 1.21 MG/DL (ref 0.5–1.05)
EGFRCR SERPLBLD CKD-EPI 2021: 46 ML/MIN/1.73M*2
EOSINOPHIL # BLD AUTO: 0.22 X10*3/UL (ref 0–0.4)
EOSINOPHIL NFR BLD AUTO: 1 %
ERYTHROCYTE [DISTWIDTH] IN BLOOD BY AUTOMATED COUNT: 15.5 % (ref 11.5–14.5)
GLUCOSE SERPL-MCNC: 113 MG/DL (ref 74–99)
GRAM STN SPEC: NORMAL
GRAM STN SPEC: NORMAL
HCT VFR BLD AUTO: 25 % (ref 36–46)
HGB BLD-MCNC: 7.6 G/DL (ref 12–16)
IMM GRANULOCYTES # BLD AUTO: 0.46 X10*3/UL (ref 0–0.5)
IMM GRANULOCYTES NFR BLD AUTO: 2.1 % (ref 0–0.9)
LYMPHOCYTES # BLD AUTO: 0.42 X10*3/UL (ref 0.8–3)
LYMPHOCYTES NFR BLD AUTO: 1.9 %
MAGNESIUM SERPL-MCNC: 1.94 MG/DL (ref 1.6–2.4)
MCH RBC QN AUTO: 29.2 PG (ref 26–34)
MCHC RBC AUTO-ENTMCNC: 30.4 G/DL (ref 32–36)
MCV RBC AUTO: 96 FL (ref 80–100)
MONOCYTES # BLD AUTO: 1.36 X10*3/UL (ref 0.05–0.8)
MONOCYTES NFR BLD AUTO: 6.1 %
NEUTROPHILS # BLD AUTO: 19.64 X10*3/UL (ref 1.6–5.5)
NEUTROPHILS NFR BLD AUTO: 88.7 %
NRBC BLD-RTO: 0 /100 WBCS (ref 0–0)
PHOSPHATE SERPL-MCNC: 3.5 MG/DL (ref 2.5–4.9)
PLATELET # BLD AUTO: 673 X10*3/UL (ref 150–450)
POTASSIUM SERPL-SCNC: 4 MMOL/L (ref 3.5–5.3)
PROT SERPL-MCNC: 5.6 G/DL (ref 6.4–8.2)
RBC # BLD AUTO: 2.6 X10*6/UL (ref 4–5.2)
SODIUM SERPL-SCNC: 136 MMOL/L (ref 136–145)
WBC # BLD AUTO: 22.2 X10*3/UL (ref 4.4–11.3)

## 2025-06-02 PROCEDURE — 2500000004 HC RX 250 GENERAL PHARMACY W/ HCPCS (ALT 636 FOR OP/ED): Performed by: INTERNAL MEDICINE

## 2025-06-02 PROCEDURE — 36415 COLL VENOUS BLD VENIPUNCTURE: CPT | Performed by: SURGERY

## 2025-06-02 PROCEDURE — 84100 ASSAY OF PHOSPHORUS: CPT | Performed by: SURGERY

## 2025-06-02 PROCEDURE — 2550000001 HC RX 255 CONTRASTS: Performed by: SURGERY

## 2025-06-02 PROCEDURE — 84075 ASSAY ALKALINE PHOSPHATASE: CPT | Performed by: SURGERY

## 2025-06-02 PROCEDURE — 2500000005 HC RX 250 GENERAL PHARMACY W/O HCPCS: Performed by: SURGERY

## 2025-06-02 PROCEDURE — 99233 SBSQ HOSP IP/OBS HIGH 50: CPT | Performed by: INTERNAL MEDICINE

## 2025-06-02 PROCEDURE — 99024 POSTOP FOLLOW-UP VISIT: CPT | Performed by: SURGERY

## 2025-06-02 PROCEDURE — 71260 CT THORAX DX C+: CPT

## 2025-06-02 PROCEDURE — 83735 ASSAY OF MAGNESIUM: CPT | Performed by: SURGERY

## 2025-06-02 PROCEDURE — A9698 NON-RAD CONTRAST MATERIALNOC: HCPCS | Performed by: SURGERY

## 2025-06-02 PROCEDURE — 2500000002 HC RX 250 W HCPCS SELF ADMINISTERED DRUGS (ALT 637 FOR MEDICARE OP, ALT 636 FOR OP/ED): Performed by: SURGERY

## 2025-06-02 PROCEDURE — 85025 COMPLETE CBC W/AUTO DIFF WBC: CPT | Performed by: SURGERY

## 2025-06-02 PROCEDURE — 74177 CT ABD & PELVIS W/CONTRAST: CPT

## 2025-06-02 PROCEDURE — 2500000001 HC RX 250 WO HCPCS SELF ADMINISTERED DRUGS (ALT 637 FOR MEDICARE OP): Performed by: INTERNAL MEDICINE

## 2025-06-02 PROCEDURE — 2500000001 HC RX 250 WO HCPCS SELF ADMINISTERED DRUGS (ALT 637 FOR MEDICARE OP): Performed by: SURGERY

## 2025-06-02 PROCEDURE — 2500000004 HC RX 250 GENERAL PHARMACY W/ HCPCS (ALT 636 FOR OP/ED): Mod: JZ | Performed by: SURGERY

## 2025-06-02 PROCEDURE — 1100000001 HC PRIVATE ROOM DAILY

## 2025-06-02 PROCEDURE — 2500000001 HC RX 250 WO HCPCS SELF ADMINISTERED DRUGS (ALT 637 FOR MEDICARE OP): Performed by: STUDENT IN AN ORGANIZED HEALTH CARE EDUCATION/TRAINING PROGRAM

## 2025-06-02 RX ORDER — VANCOMYCIN HYDROCHLORIDE 1 G/200ML
1000 INJECTION, SOLUTION INTRAVENOUS EVERY 24 HOURS
Status: DISCONTINUED | OUTPATIENT
Start: 2025-06-02 | End: 2025-06-05

## 2025-06-02 RX ORDER — VANCOMYCIN HYDROCHLORIDE 1 G/20ML
INJECTION, POWDER, LYOPHILIZED, FOR SOLUTION INTRAVENOUS DAILY PRN
Status: DISCONTINUED | OUTPATIENT
Start: 2025-06-02 | End: 2025-06-05

## 2025-06-02 RX ADMIN — PIPERACILLIN SODIUM AND TAZOBACTAM SODIUM 3.38 G: 3; .375 INJECTION, SOLUTION INTRAVENOUS at 11:19

## 2025-06-02 RX ADMIN — OXYBUTYNIN CHLORIDE 5 MG: 5 TABLET ORAL at 08:31

## 2025-06-02 RX ADMIN — IOHEXOL 100 ML: 12 SOLUTION ORAL at 10:10

## 2025-06-02 RX ADMIN — PIPERACILLIN SODIUM AND TAZOBACTAM SODIUM 3.38 G: 3; .375 INJECTION, SOLUTION INTRAVENOUS at 01:53

## 2025-06-02 RX ADMIN — OXYBUTYNIN CHLORIDE 5 MG: 5 TABLET ORAL at 21:26

## 2025-06-02 RX ADMIN — HEPARIN SODIUM 5000 UNITS: 5000 INJECTION, SOLUTION INTRAVENOUS; SUBCUTANEOUS at 21:26

## 2025-06-02 RX ADMIN — VANCOMYCIN HYDROCHLORIDE 1000 MG: 1 INJECTION, SOLUTION INTRAVENOUS at 15:32

## 2025-06-02 RX ADMIN — SIMETHICONE 40 MG: 20 SUSPENSION/ DROPS ORAL at 06:15

## 2025-06-02 RX ADMIN — IOHEXOL 80 ML: 350 INJECTION, SOLUTION INTRAVENOUS at 10:09

## 2025-06-02 RX ADMIN — OXYBUTYNIN CHLORIDE 5 MG: 5 TABLET ORAL at 15:33

## 2025-06-02 RX ADMIN — AMLODIPINE BESYLATE 5 MG: 10 TABLET ORAL at 08:31

## 2025-06-02 RX ADMIN — LIDOCAINE 4% 1 PATCH: 40 PATCH TOPICAL at 08:31

## 2025-06-02 RX ADMIN — PIPERACILLIN SODIUM AND TAZOBACTAM SODIUM 2.25 G: 2; .25 INJECTION, SOLUTION INTRAVENOUS at 21:30

## 2025-06-02 RX ADMIN — METOPROLOL TARTRATE 12.5 MG: 25 TABLET, FILM COATED ORAL at 08:31

## 2025-06-02 RX ADMIN — PANTOPRAZOLE SODIUM 40 MG: 40 TABLET, DELAYED RELEASE ORAL at 06:15

## 2025-06-02 RX ADMIN — SIMETHICONE 40 MG: 20 SUSPENSION/ DROPS ORAL at 17:08

## 2025-06-02 RX ADMIN — PIPERACILLIN SODIUM AND TAZOBACTAM SODIUM 2.25 G: 2; .25 INJECTION, SOLUTION INTRAVENOUS at 15:32

## 2025-06-02 RX ADMIN — SIMETHICONE 40 MG: 20 SUSPENSION/ DROPS ORAL at 21:26

## 2025-06-02 RX ADMIN — METOPROLOL TARTRATE 12.5 MG: 25 TABLET, FILM COATED ORAL at 21:26

## 2025-06-02 RX ADMIN — SIMETHICONE 40 MG: 20 SUSPENSION/ DROPS ORAL at 13:45

## 2025-06-02 RX ADMIN — FLUCONAZOLE 200 MG: 2 INJECTION, SOLUTION INTRAVENOUS at 17:13

## 2025-06-02 RX ADMIN — HEPARIN SODIUM 5000 UNITS: 5000 INJECTION, SOLUTION INTRAVENOUS; SUBCUTANEOUS at 06:14

## 2025-06-02 RX ADMIN — SODIUM CHLORIDE, SODIUM LACTATE, POTASSIUM CHLORIDE, AND CALCIUM CHLORIDE 50 ML/HR: .6; .31; .03; .02 INJECTION, SOLUTION INTRAVENOUS at 01:53

## 2025-06-02 RX ADMIN — HEPARIN SODIUM 5000 UNITS: 5000 INJECTION, SOLUTION INTRAVENOUS; SUBCUTANEOUS at 13:34

## 2025-06-02 ASSESSMENT — COGNITIVE AND FUNCTIONAL STATUS - GENERAL
WALKING IN HOSPITAL ROOM: A LITTLE
DRESSING REGULAR LOWER BODY CLOTHING: A LITTLE
HELP NEEDED FOR BATHING: A LITTLE
MOVING TO AND FROM BED TO CHAIR: A LITTLE
CLIMB 3 TO 5 STEPS WITH RAILING: A LITTLE
MOBILITY SCORE: 20
DAILY ACTIVITIY SCORE: 22
STANDING UP FROM CHAIR USING ARMS: A LITTLE

## 2025-06-02 ASSESSMENT — PAIN SCALES - GENERAL
PAINLEVEL_OUTOF10: 0 - NO PAIN
PAINLEVEL_OUTOF10: 0 - NO PAIN

## 2025-06-02 ASSESSMENT — PAIN SCALES - WONG BAKER: WONGBAKER_NUMERICALRESPONSE: NO HURT

## 2025-06-02 NOTE — PROGRESS NOTES
6/2/2025  Followed up with pt in room.  Pt is from home with spouse.    Remains on ivf, ib antibiotics.  CT Team will continue to follow for any needs.   Nilam Armenta RN [facility]

## 2025-06-02 NOTE — PROGRESS NOTES
Infectious disease progress note  Subjective   Leukocytosis uptrending  Perforation of stomach with peritonitis  Status post hiatal hernia repair and fundoplication on 5-21-25    Antibiotics  Diflucan day 10  Zosyn day 10    Objective   Range of Vitals (last 24 hours)  Heart Rate:  [88-93]   Temp:  [35.8 °C (96.4 °F)-37.9 °C (100.2 °F)]   Resp:  [16-18]   BP: (133-158)/(68-86)   SpO2:  [94 %-98 %]   Daily Weight  05/31/25 : 69.4 kg (153 lb)    Body mass index is 26.25 kg/m².      Physical Exam  Patient complaining of feeling more tired no new complaints 1.  Continue Diflucan      Relevant Results  Labs  Lab Results   Component Value Date    WBC 22.2 (H) 06/02/2025    HGB 7.6 (L) 06/02/2025    HCT 25.0 (L) 06/02/2025    MCV 96 06/02/2025     (H) 06/02/2025     Lab Results   Component Value Date    GLUCOSE 113 (H) 06/02/2025    CALCIUM 8.3 (L) 06/02/2025     06/02/2025    K 4.0 06/02/2025    CO2 25 06/02/2025     06/02/2025    BUN 12 06/02/2025    CREATININE 1.21 (H) 06/02/2025   ESR: --  Lab Results   Component Value Date    SEDRATE 48 (H) 07/29/2020     Lab Results   Component Value Date    CRP 3.81 (A) 07/29/2020     Lab Results   Component Value Date    ALT 9 06/02/2025    AST 9 06/02/2025    ALKPHOS 149 (H) 06/02/2025    BILITOT 0.5 06/02/2025       Microbiology  5- stool PCR's all negative  5- sterile fluid collection no growth    Imaging   6-2-2025 CT chest abdomen pelvis shows postoperative changes of recent Nissen fundoplication.  There is a new hypodensity in the spleen suspicious for infarction, scattered small collections of fluid in the abdominal pelvic cavities with a small hepatic subcapsular collection slightly hyper attenuating rim suggestive of infectious component also.  Assessment/Plan   1.  Continue Diflucan and Zosyn but will add in Vanco for better gram-positive coverage  2.  Spoke to surgery and will speak about possible IR aspiration or drain placement  3.  CBC  in a.m. to trend the white count    Other issues  Rheumatoid arthritis  Sarcoidosis  Recent abdominal surgery fundoplication    I reviewed and interpreted all lab test imaging studies and documentations from other healthcare providers  I am monitoring for antibiotic side effects and toxicity     June Avina MD

## 2025-06-02 NOTE — PROGRESS NOTES
NEPHROLOGY PROGRESS NOTE    REASON FOR CONSULT: BRUCE on CKD stage III and hypertension    SUBJECTIVE:  Patient is resting comfortably.  Had to undergo CT of the abdomen with contrast.  She is getting IV fluids.  She has noticed increasing swelling.  Breathing is okay.  No diarrhea.    OBJECTIVE:    Visit Vitals  /70   Pulse 91   Temp 37 °C (98.6 °F)   Resp 16          Intake/Output Summary (Last 24 hours) at 6/2/2025 1225  Last data filed at 6/2/2025 1119  Gross per 24 hour   Intake 264.16 ml   Output 900 ml   Net -635.84 ml        General: Awake and Alert, In no distress, Cooperative  HEENT: Oral mucosa moist, EOMI  NECK: Supple  CHEST: No crackles, no wheeze, no tachypnea  CVS: S1,S2 heard, no rubs, RRR  ABD: Soft, tenderness, BS present  EXT: Bilateral lower extremity 1+ edema    MEDICATION:    Scheduled medications  Scheduled Medications[1]  Continuous medications  Continuous Medications[2]  PRN medications  PRN Medications[3]     RESULTS:    Lab Results   Component Value Date    WBC 22.2 (H) 06/02/2025    HGB 7.6 (L) 06/02/2025    HCT 25.0 (L) 06/02/2025    MCV 96 06/02/2025     (H) 06/02/2025        Lab Results   Component Value Date    CREATININE 1.21 (H) 06/02/2025    BUN 12 06/02/2025     06/02/2025    K 4.0 06/02/2025     06/02/2025    CO2 25 06/02/2025        Radiology Imaging reviewed      ASSESSMENT/PLAN:     1.  BRUCE: Creatinine at 1.21.  She is on IV fluid.  Did not get exposed to contrast.  Continue current IV fluids and monitor renal function.  She had a peak creatinine of 2.3 this admission..  Patient is status post Nissen's fundoplication surgery and subsequent partial gastrectomy.     2.  Hypertension: Blood pressure is controlled on the amlodipine 5 mg daily and metoprolol 12.5 mg twice daily . Continue to hold losartan.    3.  CKD stage III: Baseline creatinine between 1.1-1.3.  She has longstanding history of hypertension.    4.  Hyponatremia: Secondary to free water  intake and impaired free water elimination.  This has resolved with the fluid restriction and increased protein intake.    Thank You very much for allowing me to participate in the care of this Patient    This document was created using dragon dictation and may contain unintended error    Jono Cole MD   06/02/25              [1] amLODIPine, 5 mg, oral, Daily  fluconazole, 200 mg, intravenous, q24h  heparin (porcine), 5,000 Units, subcutaneous, q8h  lidocaine, 1 patch, transdermal, Daily  metoprolol tartrate, 12.5 mg, oral, BID  oxyBUTYnin, 5 mg, oral, TID  oxygen, , inhalation, Continuous - Inhalation  pantoprazole, 40 mg, oral, Daily before breakfast   Or  pantoprazole, 40 mg, intravenous, Daily before breakfast  piperacillin-tazobactam, 3.375 g, intravenous, q8h  simethicone, 40 mg, oral, 4x daily  [Held by provider] sulfaSALAzine, 500 mg, oral, 4x daily     [2] lactated Ringer's, 50 mL/hr, Last Rate: 50 mL/hr (06/02/25 0153)     [3] PRN medications: benzocaine, bisacodyl, dextrose, dextrose, glucagon, glucagon, HYDROmorphone, HYDROmorphone, HYDROmorphone, loperamide, ondansetron **OR** ondansetron, traMADol

## 2025-06-02 NOTE — PROGRESS NOTES
"Beatriz Raymond \"Slava" is a 79 y.o. female on day 10 of admission presenting with Hiatal hernia.    Subjective   Up in chair.  More leg edema.  Likes the chocolate shakes.    Objective   Last Recorded Vitals  /70   Pulse 91   Temp 37 °C (98.6 °F)   Resp 16   Wt 69.4 kg (153 lb)   SpO2 95%     G: aox3, NAD, cooperative  HENT: neck supple, no JVD  Eyes: clear sclera  CV: RRR s1 s2  L: lower abdominal tenderness  Abd: soft, NT, non distended  Ext: no  pitting edema  N: no appreciable acute focal deficits  Psych: appropriate mood and behavior     Assessment & Plan    Post op hernia repair, peritonitis   Diarrhea  HTN  Hypokalemia  BRUCE - improved  Anemia  DVT ppx    Plan:  WBC slightly up this am, and on some mild oxygen.  CT imaging reviewed, broadening abx per ID.  Will hold off on IV fluids for now given increasing edema, effusions, and patient states she has improved oral intake?  Monitor renal function, will defer further fluids management to primary/ nephrology. (Recent echo preserved EF)  - Pain control per primary surgery  - Cr improved, nephrology following, continue to hold ARB  - hyponatremia - resolved, off fluid restriction  - Continue norvasc and metoprolol at current doses, BP control is stable.    Cuauhtemoc Armijo MD      "

## 2025-06-02 NOTE — PROGRESS NOTES
"Beatriz Raymond \"Slava" is a 79 y.o. female on day 10 of admission presenting with Hiatal hernia.    Subjective    No acute events overnight.  Afebrile with stable hemodynamics.  Her diarrhea has slowed down.  She is tolerating liquids, with persistent lower abdominal pain.  Leukocytosis is increased to 22,000 this morning.    Objective   Physical Exam  In no acute distress  No respiratory distress  Abdomen is softly distended, mildly diffusely tender  ELENI drain with scant serosanguineous output  Incisions are clean dry and intact  Alert and oriented x 3    Last Recorded Vitals  Blood pressure 141/68, pulse 89, temperature 36.7 °C (98.1 °F), temperature source Temporal, resp. rate 16, height 1.626 m (5' 4.02\"), weight 69.4 kg (153 lb), SpO2 94%.    Intake/Output last 3 Shifts:  I/O last 3 completed shifts:  In: 1481.7 (21.3 mL/kg) [I.V.:1131.7 (16.3 mL/kg); IV Piggyback:350]  Out: 1200 (17.3 mL/kg) [Urine:1200 (0.5 mL/kg/hr)]  Weight: 69.4 kg     Assessment: 79F s/p laparoscopic HHR with mesh and Nissen fundoplication on 5/21/25, who developed a leak in the proximal portion of the fundoplication status post diagnostic laparoscopy and partial gastrectomy, and redo fundoplication on 5/20/2025.  Patient has been having episodes of diarrhea.  She has a leukocytosis which has slightly trended downwards.     Plan:  - Encourage PO, full liquids as tolerated  - Continue antibiotics, Zosyn and fluconazole, negative for C.diff x3  - Daily labs  - Mechanical and chemical DVT prophylaxis  - Avoid NSAIDs and gabapentin  - Appreciate nephrology recommendations  - Appreciate medicine recommendations  - Antiemetics as needed  - Out of bed to chair, may ambulate  - Will obtain CTAP with PO and IV contrast today    I spent 30 minutes in the professional and overall care of this patient.      Eliud Hutton MD  "

## 2025-06-02 NOTE — CARE PLAN
The patient's goals for the shift include Pain control    The clinical goals for the shift include pt's diarrhea will resolve/ gas pain will be relieved    Problem: Pain  Goal: Takes deep breaths with improved pain control throughout the shift  Outcome: Progressing  Goal: Turns in bed with improved pain control throughout the shift  Outcome: Progressing  Goal: Walks with improved pain control throughout the shift  Outcome: Progressing  Goal: Performs ADL's with improved pain control throughout shift  Outcome: Progressing  Goal: Participates in PT with improved pain control throughout the shift  Outcome: Progressing  Goal: Free from opioid side effects throughout the shift  Outcome: Progressing  Goal: Free from acute confusion related to pain meds throughout the shift  Outcome: Progressing     Problem: Skin  Goal: Decreased wound size/increased tissue granulation at next dressing change  Outcome: Progressing  Goal: Participates in plan/prevention/treatment measures  Outcome: Progressing  Goal: Prevent/manage excess moisture  Outcome: Progressing  Goal: Prevent/minimize sheer/friction injuries  Outcome: Progressing  Goal: Promote/optimize nutrition  Outcome: Progressing  Goal: Promote skin healing  Outcome: Progressing     Problem: Fall/Injury  Goal: Not fall by end of shift  Outcome: Progressing     Problem: Pain - Adult  Goal: Verbalizes/displays adequate comfort level or baseline comfort level  Outcome: Progressing     Problem: Safety - Adult  Goal: Free from fall injury  Outcome: Progressing     Problem: Discharge Planning  Goal: Discharge to home or other facility with appropriate resources  Outcome: Progressing     Problem: Chronic Conditions and Co-morbidities  Goal: Patient's chronic conditions and co-morbidity symptoms are monitored and maintained or improved  Outcome: Progressing     Problem: Nutrition  Goal: Nutrient intake appropriate for maintaining nutritional needs  Outcome: Progressing

## 2025-06-02 NOTE — CONSULTS
Vancomycin Dosing by Pharmacy- INITIAL    Beatriz Raymond is a 79 y.o. year old female who Pharmacy has been consulted for vancomycin dosing for abdominal infection. Based on the patient's indication and renal status this patient will be dosed based on a goal AUC of 400-600.     Renal function is currently stable.    Visit Vitals  /70   Pulse 91   Temp 37 °C (98.6 °F)   Resp 16        Lab Results   Component Value Date    CREATININE 1.21 (H) 2025    CREATININE 1.11 (H) 2025    CREATININE 1.13 (H) 2025    CREATININE 1.30 (H) 2025    CREATININE 1.22 (H) 2025        Patient weight is as follows:   Vitals:    25 0700   Weight: 69.4 kg (153 lb)       Cultures:  Susceptibility data for the encounter in last 14 days.  Collected Specimen Info Organism   25 Fluid from Intra-abdominal Abscess Mixed Gram-Positive Bacteria          I/O last 3 completed shifts:  In: 1481.7 (21.3 mL/kg) [I.V.:1131.7 (16.3 mL/kg); IV Piggyback:350]  Out: 1200 (17.3 mL/kg) [Urine:1200 (0.5 mL/kg/hr)]  Weight: 69.4 kg   I/O during current shift:  I/O this shift:  In: -   Out: 200 [Urine:200]    Temp (24hrs), Av.1 °C (98.7 °F), Min:35.8 °C (96.4 °F), Max:37.9 °C (100.2 °F)         Assessment/Plan     Patient will not be given a loading dose.  Will initiate vancomycin maintenance, 1000 mg every 24 hours.    This dosing regimen is predicted by [employer] to result in the following pharmacokinetic parameters:  Regimen: 1000 mg IV every 24 hours.  Exposure target: AUC24 (range) 400-600 mg/L.hr   KEZ92-85: 320 mg/L.hr  AUC24,ss: 468 mg/L.hr  Probability of AUC24 > 400: 69 %  Ctrough,ss: 14.7 mg/L  Probability of Ctrough,ss > 20: 18 %    Follow-up level will be ordered on  at AM labs unless clinically indicated sooner.  Will continue to monitor renal function daily while on vancomycin and order serum creatinine at least every 48 hours if not already ordered.  Follow for continued vancomycin needs, clinical  response, and signs/symptoms of toxicity.       Tiffanie Tavares, PharmD

## 2025-06-03 ENCOUNTER — APPOINTMENT (OUTPATIENT)
Dept: RADIOLOGY | Facility: HOSPITAL | Age: 80
End: 2025-06-03
Payer: MEDICARE

## 2025-06-03 LAB
ALBUMIN SERPL BCP-MCNC: 2.5 G/DL (ref 3.4–5)
ALP SERPL-CCNC: 134 U/L (ref 33–136)
ALT SERPL W P-5'-P-CCNC: 8 U/L (ref 7–45)
ANION GAP SERPL CALC-SCNC: 18 MMOL/L (ref 10–20)
AST SERPL W P-5'-P-CCNC: 7 U/L (ref 9–39)
BASOPHILS # BLD AUTO: 0.06 X10*3/UL (ref 0–0.1)
BASOPHILS NFR BLD AUTO: 0.3 %
BILIRUB SERPL-MCNC: 0.4 MG/DL (ref 0–1.2)
BUN SERPL-MCNC: 15 MG/DL (ref 6–23)
CALCIUM SERPL-MCNC: 8.4 MG/DL (ref 8.6–10.3)
CHLORIDE SERPL-SCNC: 97 MMOL/L (ref 98–107)
CO2 SERPL-SCNC: 23 MMOL/L (ref 21–32)
CREAT SERPL-MCNC: 1.3 MG/DL (ref 0.5–1.05)
EGFRCR SERPLBLD CKD-EPI 2021: 42 ML/MIN/1.73M*2
EOSINOPHIL # BLD AUTO: 0.29 X10*3/UL (ref 0–0.4)
EOSINOPHIL NFR BLD AUTO: 1.4 %
ERYTHROCYTE [DISTWIDTH] IN BLOOD BY AUTOMATED COUNT: 15.7 % (ref 11.5–14.5)
GLUCOSE SERPL-MCNC: 117 MG/DL (ref 74–99)
HCT VFR BLD AUTO: 24.4 % (ref 36–46)
HGB BLD-MCNC: 7.8 G/DL (ref 12–16)
IMM GRANULOCYTES # BLD AUTO: 0.25 X10*3/UL (ref 0–0.5)
IMM GRANULOCYTES NFR BLD AUTO: 1.2 % (ref 0–0.9)
LYMPHOCYTES # BLD AUTO: 0.39 X10*3/UL (ref 0.8–3)
LYMPHOCYTES NFR BLD AUTO: 1.9 %
MAGNESIUM SERPL-MCNC: 1.88 MG/DL (ref 1.6–2.4)
MCH RBC QN AUTO: 30 PG (ref 26–34)
MCHC RBC AUTO-ENTMCNC: 32 G/DL (ref 32–36)
MCV RBC AUTO: 94 FL (ref 80–100)
MONOCYTES # BLD AUTO: 1.36 X10*3/UL (ref 0.05–0.8)
MONOCYTES NFR BLD AUTO: 6.5 %
NEUTROPHILS # BLD AUTO: 18.72 X10*3/UL (ref 1.6–5.5)
NEUTROPHILS NFR BLD AUTO: 88.7 %
NRBC BLD-RTO: 0 /100 WBCS (ref 0–0)
PHOSPHATE SERPL-MCNC: 3.5 MG/DL (ref 2.5–4.9)
PLATELET # BLD AUTO: 745 X10*3/UL (ref 150–450)
POTASSIUM SERPL-SCNC: 4 MMOL/L (ref 3.5–5.3)
PROT SERPL-MCNC: 5.6 G/DL (ref 6.4–8.2)
RBC # BLD AUTO: 2.6 X10*6/UL (ref 4–5.2)
SODIUM SERPL-SCNC: 134 MMOL/L (ref 136–145)
WBC # BLD AUTO: 21.1 X10*3/UL (ref 4.4–11.3)

## 2025-06-03 PROCEDURE — 2500000004 HC RX 250 GENERAL PHARMACY W/ HCPCS (ALT 636 FOR OP/ED): Performed by: SURGERY

## 2025-06-03 PROCEDURE — 2500000004 HC RX 250 GENERAL PHARMACY W/ HCPCS (ALT 636 FOR OP/ED): Performed by: RADIOLOGY

## 2025-06-03 PROCEDURE — 49405 IMAGE CATH FLUID COLXN VISC: CPT

## 2025-06-03 PROCEDURE — 0W9G3ZZ DRAINAGE OF PERITONEAL CAVITY, PERCUTANEOUS APPROACH: ICD-10-PCS | Performed by: RADIOLOGY

## 2025-06-03 PROCEDURE — 2500000001 HC RX 250 WO HCPCS SELF ADMINISTERED DRUGS (ALT 637 FOR MEDICARE OP): Performed by: INTERNAL MEDICINE

## 2025-06-03 PROCEDURE — 2500000005 HC RX 250 GENERAL PHARMACY W/O HCPCS: Performed by: RADIOLOGY

## 2025-06-03 PROCEDURE — 83735 ASSAY OF MAGNESIUM: CPT | Performed by: SURGERY

## 2025-06-03 PROCEDURE — 87077 CULTURE AEROBIC IDENTIFY: CPT | Mod: PARLAB | Performed by: SURGERY

## 2025-06-03 PROCEDURE — 99152 MOD SED SAME PHYS/QHP 5/>YRS: CPT

## 2025-06-03 PROCEDURE — 80053 COMPREHEN METABOLIC PANEL: CPT | Performed by: SURGERY

## 2025-06-03 PROCEDURE — 99232 SBSQ HOSP IP/OBS MODERATE 35: CPT | Performed by: STUDENT IN AN ORGANIZED HEALTH CARE EDUCATION/TRAINING PROGRAM

## 2025-06-03 PROCEDURE — 36415 COLL VENOUS BLD VENIPUNCTURE: CPT | Performed by: SURGERY

## 2025-06-03 PROCEDURE — 84100 ASSAY OF PHOSPHORUS: CPT | Performed by: SURGERY

## 2025-06-03 PROCEDURE — 2500000005 HC RX 250 GENERAL PHARMACY W/O HCPCS: Performed by: SURGERY

## 2025-06-03 PROCEDURE — 2500000004 HC RX 250 GENERAL PHARMACY W/ HCPCS (ALT 636 FOR OP/ED): Performed by: INTERNAL MEDICINE

## 2025-06-03 PROCEDURE — C1729 CATH, DRAINAGE: HCPCS

## 2025-06-03 PROCEDURE — 85025 COMPLETE CBC W/AUTO DIFF WBC: CPT | Performed by: SURGERY

## 2025-06-03 PROCEDURE — 1100000001 HC PRIVATE ROOM DAILY

## 2025-06-03 PROCEDURE — 2500000002 HC RX 250 W HCPCS SELF ADMINISTERED DRUGS (ALT 637 FOR MEDICARE OP, ALT 636 FOR OP/ED): Performed by: SURGERY

## 2025-06-03 PROCEDURE — 2720000007 HC OR 272 NO HCPCS

## 2025-06-03 PROCEDURE — 2500000001 HC RX 250 WO HCPCS SELF ADMINISTERED DRUGS (ALT 637 FOR MEDICARE OP): Performed by: STUDENT IN AN ORGANIZED HEALTH CARE EDUCATION/TRAINING PROGRAM

## 2025-06-03 PROCEDURE — 2500000001 HC RX 250 WO HCPCS SELF ADMINISTERED DRUGS (ALT 637 FOR MEDICARE OP): Performed by: SURGERY

## 2025-06-03 RX ORDER — MIDAZOLAM HYDROCHLORIDE 1 MG/ML
INJECTION INTRAMUSCULAR; INTRAVENOUS
Status: COMPLETED | OUTPATIENT
Start: 2025-06-03 | End: 2025-06-03

## 2025-06-03 RX ORDER — FENTANYL CITRATE 50 UG/ML
INJECTION, SOLUTION INTRAMUSCULAR; INTRAVENOUS
Status: COMPLETED | OUTPATIENT
Start: 2025-06-03 | End: 2025-06-03

## 2025-06-03 RX ORDER — SODIUM CHLORIDE, SODIUM LACTATE, POTASSIUM CHLORIDE, CALCIUM CHLORIDE 600; 310; 30; 20 MG/100ML; MG/100ML; MG/100ML; MG/100ML
50 INJECTION, SOLUTION INTRAVENOUS CONTINUOUS
Status: DISCONTINUED | OUTPATIENT
Start: 2025-06-03 | End: 2025-06-03

## 2025-06-03 RX ADMIN — MIDAZOLAM HYDROCHLORIDE 1 MG: 1 INJECTION, SOLUTION INTRAMUSCULAR; INTRAVENOUS at 13:58

## 2025-06-03 RX ADMIN — METOPROLOL TARTRATE 12.5 MG: 25 TABLET, FILM COATED ORAL at 21:41

## 2025-06-03 RX ADMIN — HEPARIN SODIUM 5000 UNITS: 5000 INJECTION, SOLUTION INTRAVENOUS; SUBCUTANEOUS at 06:12

## 2025-06-03 RX ADMIN — PANTOPRAZOLE SODIUM 40 MG: 40 TABLET, DELAYED RELEASE ORAL at 06:12

## 2025-06-03 RX ADMIN — SIMETHICONE 40 MG: 20 SUSPENSION/ DROPS ORAL at 16:04

## 2025-06-03 RX ADMIN — LIDOCAINE 4% 1 PATCH: 40 PATCH TOPICAL at 08:23

## 2025-06-03 RX ADMIN — AMLODIPINE BESYLATE 5 MG: 10 TABLET ORAL at 08:23

## 2025-06-03 RX ADMIN — Medication 3 L/MIN: at 13:59

## 2025-06-03 RX ADMIN — PIPERACILLIN SODIUM AND TAZOBACTAM SODIUM 2.25 G: 2; .25 INJECTION, SOLUTION INTRAVENOUS at 21:41

## 2025-06-03 RX ADMIN — Medication 2 L/MIN: at 07:27

## 2025-06-03 RX ADMIN — Medication 2 L/MIN: at 08:18

## 2025-06-03 RX ADMIN — METOPROLOL TARTRATE 12.5 MG: 25 TABLET, FILM COATED ORAL at 08:23

## 2025-06-03 RX ADMIN — HEPARIN SODIUM 5000 UNITS: 5000 INJECTION, SOLUTION INTRAVENOUS; SUBCUTANEOUS at 21:41

## 2025-06-03 RX ADMIN — SODIUM CHLORIDE, SODIUM LACTATE, POTASSIUM CHLORIDE, AND CALCIUM CHLORIDE 50 ML/HR: .6; .31; .03; .02 INJECTION, SOLUTION INTRAVENOUS at 09:41

## 2025-06-03 RX ADMIN — VANCOMYCIN HYDROCHLORIDE 1000 MG: 1 INJECTION, SOLUTION INTRAVENOUS at 21:41

## 2025-06-03 RX ADMIN — SIMETHICONE 40 MG: 20 SUSPENSION/ DROPS ORAL at 06:12

## 2025-06-03 RX ADMIN — SIMETHICONE 40 MG: 20 SUSPENSION/ DROPS ORAL at 21:41

## 2025-06-03 RX ADMIN — OXYBUTYNIN CHLORIDE 5 MG: 5 TABLET ORAL at 08:23

## 2025-06-03 RX ADMIN — PIPERACILLIN SODIUM AND TAZOBACTAM SODIUM 2.25 G: 2; .25 INJECTION, SOLUTION INTRAVENOUS at 03:53

## 2025-06-03 RX ADMIN — FENTANYL CITRATE 50 MCG: 50 INJECTION, SOLUTION INTRAMUSCULAR; INTRAVENOUS at 13:58

## 2025-06-03 RX ADMIN — FLUCONAZOLE 200 MG: 2 INJECTION, SOLUTION INTRAVENOUS at 17:03

## 2025-06-03 RX ADMIN — PIPERACILLIN SODIUM AND TAZOBACTAM SODIUM 2.25 G: 2; .25 INJECTION, SOLUTION INTRAVENOUS at 16:04

## 2025-06-03 RX ADMIN — Medication 2 L/MIN: at 07:26

## 2025-06-03 RX ADMIN — PIPERACILLIN SODIUM AND TAZOBACTAM SODIUM 2.25 G: 2; .25 INJECTION, SOLUTION INTRAVENOUS at 09:47

## 2025-06-03 RX ADMIN — OXYBUTYNIN CHLORIDE 5 MG: 5 TABLET ORAL at 21:41

## 2025-06-03 ASSESSMENT — PAIN - FUNCTIONAL ASSESSMENT
PAIN_FUNCTIONAL_ASSESSMENT: 0-10

## 2025-06-03 ASSESSMENT — PAIN SCALES - GENERAL
PAINLEVEL_OUTOF10: 0 - NO PAIN

## 2025-06-03 NOTE — PROGRESS NOTES
"Beatriz Raymond \"Slava" is a 79 y.o. female on day 11 of admission presenting with Hiatal hernia.    Subjective    No acute events overnight.  Afebrile with stable hemodynamics.  Leukocytosis persistent to 21,000.  She is passing flatus, had a small bowel movement yesterday as well.    Objective   Physical Exam  Physical Exam:   Constitutional: Well developed, awake/alert/oriented x3, no distress, alert and cooperative  Eyes: PERRL, EOMI, clear sclera  Head/Neck: Neck supple, no apparent injury, No JVD, trachea midline  Respiratory/Thorax: good chest expansion, thorax symmetric  Cardiovascular: Regular, rate and rhythm,  2+ equal pulses of the extremities  Gastrointestinal: Nondistended, soft, minimally tender, no rebound tenderness or guarding, no masses palpable, incisions c/d/i  Musculoskeletal: ROM intact, no joint swelling, normal strength  Extremities: normal extremities, no cyanosis edema, contusions or wounds, no clubbing  Neurological: alert and oriented x3, intact senses,  Psychological: Appropriate mood and behavior  Skin: Warm and dry, no lesions, no rashes      Last Recorded Vitals  Blood pressure 155/67, pulse 85, temperature 35.9 °C (96.6 °F), temperature source Temporal, resp. rate 18, height 1.626 m (5' 4.02\"), weight 69.4 kg (153 lb), SpO2 93%.    Intake/Output last 3 Shifts:  I/O last 3 completed shifts:  In: 1589.2 (22.9 mL/kg) [I.V.:1089.2 (15.7 mL/kg); IV Piggyback:500]  Out: 500 (7.2 mL/kg) [Urine:500 (0.2 mL/kg/hr)]  Weight: 69.4 kg     Assessment: 79F s/p laparoscopic HHR with mesh and Nissen fundoplication on 5/21/25, who developed a leak in the proximal portion of the fundoplication status post diagnostic laparoscopy and partial gastrectomy, and redo fundoplication on 5/20/2025.  Persistent leukocytosis.     Plan:  - Encourage PO, full liquids as tolerated  - Continue antibiotics, Vanc/Zosyn/fluconazole, negative for C.diff x3  - Daily labs  - Mechanical and chemical DVT prophylaxis  - Avoid " NSAIDs and gabapentin  - Appreciate nephrology recommendations  - Appreciate medicine recommendations  - Antiemetics as needed  - Out of bed to chair, may ambulate  - Tentative IR-guided drainage of intra-abdominal fluid collections today    I spent 30 minutes in the professional and overall care of this patient.      Eliud Hutton MD

## 2025-06-03 NOTE — PROGRESS NOTES
NEPHROLOGY PROGRESS NOTE    REASON FOR CONSULT: BRUCE on CKD stage III and hypertension    SUBJECTIVE:  Patient has some hard time then.  She did have imaging studies which showed bilateral pleural effusion.  Pulmonary is involved.  She is getting IV fluids and is on clear liquid to full liquid diet.  She has been having some diarrhea.  She has worsening leg swelling.    OBJECTIVE:    Visit Vitals  /67 (BP Location: Left arm, Patient Position: Sitting)   Pulse 85   Temp 35.9 °C (96.6 °F) (Temporal)   Resp 18          Intake/Output Summary (Last 24 hours) at 6/3/2025 1234  Last data filed at 6/3/2025 1152  Gross per 24 hour   Intake 1885.33 ml   Output 300 ml   Net 1585.33 ml        General: Awake and Alert, In no distress, Cooperative  HEENT: Oral mucosa moist, EOMI  NECK: Supple  CHEST: No crackles, no wheeze, mild tachypnea, decreased air entry  CVS: S1,S2 heard, no rubs, RRR  ABD: Soft, tenderness, BS present  EXT: Bilateral lower extremity 1-2+ edema    MEDICATION:    Scheduled medications  Scheduled Medications[1]  Continuous medications  Continuous Medications[2]  PRN medications  PRN Medications[3]     RESULTS:    Lab Results   Component Value Date    WBC 21.1 (H) 06/03/2025    HGB 7.8 (L) 06/03/2025    HCT 24.4 (L) 06/03/2025    MCV 94 06/03/2025     (H) 06/03/2025        Lab Results   Component Value Date    CREATININE 1.30 (H) 06/03/2025    BUN 15 06/03/2025     (L) 06/03/2025    K 4.0 06/03/2025    CL 97 (L) 06/03/2025    CO2 23 06/03/2025        Radiology Imaging reviewed      ASSESSMENT/PLAN:     1.  BRUCE: Creatinine today at 1.3.  She is on IV fluid.  Did get exposed to contrast.  She had a peak creatinine of 2.3 this admission. Patient is status post Nissen's fundoplication surgery and subsequent partial gastrectomy.  Will discontinue IV fluids as she is volume overloaded.  May need to consider IV diuresis in the event her breathing gets worse but I am skeptical that it may affect her  kidney function.    2.  Hypertension: Blood pressure is slightly elevated.  On the amlodipine 5 mg daily and metoprolol 12.5 mg twice daily . Continue to hold losartan.  Stop IV fluids.    3.  CKD stage III: Baseline creatinine between 1.1-1.3.  She has longstanding history of hypertension.    4.  Hyponatremia: Patient is volume overloaded.  She has been on full liquid diet and is getting IV fluids.  Will stop the IV fluids for now.    Thank You very much for allowing me to participate in the care of this Patient    This document was created using dragon dictation and may contain unintended error    Jono Cole MD   06/03/25                [1] amLODIPine, 5 mg, oral, Daily  fluconazole, 200 mg, intravenous, q24h  heparin (porcine), 5,000 Units, subcutaneous, q8h  lidocaine, 1 patch, transdermal, Daily  metoprolol tartrate, 12.5 mg, oral, BID  oxyBUTYnin, 5 mg, oral, TID  oxygen, , inhalation, Continuous - Inhalation  pantoprazole, 40 mg, oral, Daily before breakfast   Or  pantoprazole, 40 mg, intravenous, Daily before breakfast  piperacillin-tazobactam, 2.25 g, intravenous, q6h  simethicone, 40 mg, oral, 4x daily  [Held by provider] sulfaSALAzine, 500 mg, oral, 4x daily  vancomycin, 1,000 mg, intravenous, q24h     [2]    [3] PRN medications: benzocaine, bisacodyl, dextrose, dextrose, glucagon, glucagon, HYDROmorphone, HYDROmorphone, HYDROmorphone, loperamide, ondansetron **OR** ondansetron, traMADol, vancomycin

## 2025-06-03 NOTE — PRE-PROCEDURE NOTE
Interventional Radiology Preprocedure Note    Indication for procedure: The primary encounter diagnosis was Hiatal hernia. Diagnoses of Perforated viscus, Leukocytosis, unspecified type, Abnormal results of pulmonary function studies, and Localized swelling, mass and lump, lower limb, bilateral were also pertinent to this visit.    Relevant review of systems: NA    Relevant Labs:   Lab Results   Component Value Date    CREATININE 1.30 (H) 06/03/2025    EGFR 42 (L) 06/03/2025    INR 1.2 (H) 05/24/2025    PROTIME 13.2 (H) 05/24/2025       Planned Sedation/Anesthesia: Moderate    Airway assessment: normal    Directed physical examination:    Aox3  No increased work of breathing.   No acute distress      Mallampati: II (hard and soft palate, upper portion of tonsils and uvula visible)    ASA Score: ASA 2 - Patient with mild systemic disease with no functional limitations    Benefits, risks and alternatives of procedure and planned sedation have been discussed with the patient and/or their representative. All questions answered and they agree to proceed.

## 2025-06-03 NOTE — CARE PLAN
The patient's goals for the shift include Pain control    The clinical goals for the shift include hemodynamicall stabld

## 2025-06-03 NOTE — PROCEDURES
Interventional Radiology Brief Postprocedure Note    Attending: Thea Hopkins MD    Assistant:   Staff Role   Jairo Garcia Radiology Technologist   Kaye Cherry, RN Radiology Nurse   Thea Hopkins MD Radiologist       Diagnosis:   1. Hiatal hernia        2. Perforated viscus  Case Request Operating Room: LAPAROSCOPY, EXPLORATORY    Case Request Operating Room: LAPAROSCOPY, EXPLORATORY      3. Leukocytosis, unspecified type  Lower extremity venous duplex bilateral    Lower extremity venous duplex bilateral      4. Abnormal results of pulmonary function studies  Lower extremity venous duplex bilateral    Lower extremity venous duplex bilateral      5. Localized swelling, mass and lump, lower limb, bilateral  Lower extremity venous duplex bilateral          Description of procedure: aspiration of deep pelvic collection; 0.5 ml thick debris aspirated.     Timeout:  Yes    Procedure Area: Procedure Area     Anesthesia:   Local/Topical    Complications: None    Estimated Blood Loss: minimal    Medications (Filter: Administrations occurring from 1414 to 1414 on 06/03/25) As of 06/03/25 1414      None          No specimens collected      See detailed result report with images in PACS.    The patient tolerated the procedure well without incident or complication and is in stable condition.

## 2025-06-03 NOTE — CARE PLAN
The patient's goals for the shift include Pain control    The clinical goals for the shift include pain control    Problem: Pain  Goal: Takes deep breaths with improved pain control throughout the shift  Outcome: Progressing  Goal: Turns in bed with improved pain control throughout the shift  Outcome: Progressing  Goal: Walks with improved pain control throughout the shift  Outcome: Progressing  Goal: Performs ADL's with improved pain control throughout shift  Outcome: Progressing  Goal: Participates in PT with improved pain control throughout the shift  Outcome: Progressing  Goal: Free from opioid side effects throughout the shift  Outcome: Progressing  Goal: Free from acute confusion related to pain meds throughout the shift  Outcome: Progressing     Problem: Skin  Goal: Decreased wound size/increased tissue granulation at next dressing change  Outcome: Progressing  Goal: Participates in plan/prevention/treatment measures  Outcome: Progressing  Goal: Prevent/manage excess moisture  Outcome: Progressing  Goal: Prevent/minimize sheer/friction injuries  Outcome: Progressing  Goal: Promote/optimize nutrition  Outcome: Progressing  Goal: Promote skin healing  Outcome: Progressing     Problem: Fall/Injury  Goal: Not fall by end of shift  Outcome: Progressing     Problem: Pain - Adult  Goal: Verbalizes/displays adequate comfort level or baseline comfort level  Outcome: Progressing     Problem: Safety - Adult  Goal: Free from fall injury  Outcome: Progressing     Problem: Discharge Planning  Goal: Discharge to home or other facility with appropriate resources  Outcome: Progressing     Problem: Chronic Conditions and Co-morbidities  Goal: Patient's chronic conditions and co-morbidity symptoms are monitored and maintained or improved  Outcome: Progressing     Problem: Nutrition  Goal: Nutrient intake appropriate for maintaining nutritional needs  Outcome: Progressing

## 2025-06-03 NOTE — PROGRESS NOTES
"Dixon Raymond \"Slava" is a 79 y.o. female who presents with No chief complaint on file..    Patient seen resting sitting in chair.  She still has diffuse abdominal discomfort.  However she states that her abdominal pain is not worse and slightly better since initial presentation.  She is scheduled for IR drainage at 3 PM today.    Objective   Vitals:    06/03/25 0824   BP: 155/67   Pulse: 85   Resp: 18   Temp: 35.9 °C (96.6 °F)   SpO2: 93%      Physical Exam  Physical Exam:  Constitutional: frail, awake, alert, no acute distress  ENMT: mucous membranes moist, EOMI, conjunctivae clear  Head/Neck: normocephalic, atraumatic; supple, trachea midline  Respiratory/Thorax: patent airways, CTAB; no wheezes, rales, or rhonchi  Cardiovascular: RRR, no murmur  Gastrointestinal: soft, nondistended, non-tender, bowel sounds appreciated  Extremities: palpable peripheral pulses, no edema or cyanosis  Neurological: AO x3, no focal deficits  Psychological: appropriate mood and behavior  Skin: warm and dry    Assessment/Plan       Ginny Raymond is a 79 y.o. female presenting to the hospital for elective surgical procedure for hiatal hernia.  Patient underwent the procedure.  Postoperatively found to have perforation and peritonitis.  Patient underwent partial gastrectomy.     Acute medical conditions:  #Postop hernia repair, peritonitis  #Diarrhea  #Leukocytosis  -Full liquid diet per general surgery  - Continue antibiotics, ID following patient currently on Vanco, Zosyn, fluconazole with negative C. difficile  - CT abdomen pelvis showed scattered small collections of fluid in the abdominal and pelvic cavity  - IR was consulted for drainage of fluid as well as for cultures in order to de-escalate antibiotics    #BRUCE on CKD 3  #Hyponatremia  #Hypertension  - Baseline creatinine 1.1-1.3  - Patient's blood pressure currently controlled on 5 mg amlodipine, metoprolol 12.5 mg twice daily.  Continue to hold on losartan  - Nephrology " following, hyponatremia in the setting of volume overload, IV fluids stopped    #Bilateral pleural effusions and atelectasis  - Patient currently on room air  - Pulmonology consulted suspicion for degree of fluid overload, no plans for thoracentesis at this time      DVT PPX: Subcu heparin  Diet: N.p.o.  IVF:  Code Status: Full    This is a preliminary note written by the resident. Please wait for attending addendum for finalization of note and recommendations.    Venkat Dawson DO, PhD  Internal Medicine PGY2

## 2025-06-03 NOTE — CONSULTS
Reason For Consult  Chief complaint-pleural effusion    History Of Present Illness  Ginny Raymond is a 79 y.o. female presenting to the hospital for elective surgical procedure for hiatal hernia.  Patient underwent the procedure.  Postoperatively there were problems and she went second time 2 OR.  Was found to have perforation and peritonitis.  Patient underwent partial gastrectomy.  Currently patient is in her room.  She is sitting in a chair.  Patient's complaints of generalized weakness and dyspnea on exertion.  She has mild nonproductive cough.  No fever or chills.  Patient denies any prior history of lung problems.  Denies chest pain.     Past Medical History  She has a past medical history of Anemia, Arthritis, Cataract, Chronic back pain, Colon polyp (2020), Fibroid, GERD (gastroesophageal reflux disease), Hearing aid worn, Hiatal hernia, HL (hearing loss), HLD (hyperlipidemia), Hypertension, PONV (postoperative nausea and vomiting) (1975), RA (rheumatoid arthritis), Sarcoidosis, Sarcoma (Multi), Sarcoma of right thigh (Multi), Thyroid nodule (2014), and Vision loss.    Surgical History  She has a past surgical history that includes Cholecystectomy (6/8/20); Hysterectomy (7/6/10); Esophagogastroduodenoscopy (4/8/16); Colonoscopy; Upper gastrointestinal endoscopy; Cataract extraction; Hip Arthroplasty; Knee arthroscopy w/ meniscal repair (Left); and Leg Surgery (Right).     Social History  She reports that she has never smoked. She has never used smokeless tobacco. She reports that she does not drink alcohol and does not use drugs.    Family History  Family History[1]     Allergies  Codeine and Codeine sulfate    Review of Systems  10 system review of systems performed and negative for any complaints outside of the ones mentioned in history of present illness     Physical Exam  Head and face no deformities  Oropharynx normal mucosa  Neck is supple no thyromegaly  Chest is symmetric no crackles or wheezing  Heart  "is regular no murmurs or gallops  Abdomen is soft and nontender  Skin is intact  Joints are normal  Neurologically patient is moving all 4 limbs.     Last Recorded Vitals  Blood pressure 155/67, pulse 85, temperature 35.9 °C (96.6 °F), temperature source Temporal, resp. rate 18, height 1.626 m (5' 4.02\"), weight 69.4 kg (153 lb), SpO2 93%.       Assessment/Plan      Bilateral pleural effusions and atelectasis.  Small to moderate size.  Reviewing her CAT scans performed during the last 10 days there is no significant change.  Suspicion for some degree of fluid overload.  Patient has generalized edema.    Recent surgical procedures with perforation and peritonitis.  Patient is recovering.  Persistent leukocytosis.  Imaging of the abdomen raises suspicion for possible loculated fluid collections.    Plan:  At this point there is no urgent need for thoracentesis.  We should attempt to balance fluid intake and outputs and try to achieve slightly negative balance.  Encourage use of incentive spirometry.  Repeat chest x-ray in 2 to 3 days to monitor pleural effusions.  Eventually if there is worsening we may need to perform thoracentesis.  DVT prophylaxis      Jitendra Vora MD         [1]   Family History  Problem Relation Name Age of Onset    Arthritis Mother Ce Kuhn     Hypertension Mother Ce Kuhn     Miscarriages / Stillbirths Mother Ce Kuhn     Cancer Father Danilo Kuhn     Hearing loss Father Danilo Kuhn     Hyperlipidemia Father Danilo Kuhn     Hearing loss Brother Poncho Kuhn     Breast cancer Paternal Grandmother Renetta kuhn     Colon cancer Neg Hx      Celiac disease Neg Hx      Colon polyps Neg Hx      Irritable bowel syndrome Neg Hx      Liver disease Neg Hx      Blood clot Neg Hx       "

## 2025-06-04 LAB
ALBUMIN SERPL BCP-MCNC: 2.3 G/DL (ref 3.4–5)
ALP SERPL-CCNC: 131 U/L (ref 33–136)
ALT SERPL W P-5'-P-CCNC: 8 U/L (ref 7–45)
ANION GAP SERPL CALC-SCNC: 16 MMOL/L (ref 10–20)
AST SERPL W P-5'-P-CCNC: 9 U/L (ref 9–39)
BASOPHILS # BLD AUTO: 0.05 X10*3/UL (ref 0–0.1)
BASOPHILS NFR BLD AUTO: 0.3 %
BILIRUB SERPL-MCNC: 0.4 MG/DL (ref 0–1.2)
BUN SERPL-MCNC: 16 MG/DL (ref 6–23)
CALCIUM SERPL-MCNC: 8 MG/DL (ref 8.6–10.3)
CHLORIDE SERPL-SCNC: 99 MMOL/L (ref 98–107)
CO2 SERPL-SCNC: 22 MMOL/L (ref 21–32)
CREAT SERPL-MCNC: 1.36 MG/DL (ref 0.5–1.05)
EGFRCR SERPLBLD CKD-EPI 2021: 40 ML/MIN/1.73M*2
EOSINOPHIL # BLD AUTO: 0.27 X10*3/UL (ref 0–0.4)
EOSINOPHIL NFR BLD AUTO: 1.5 %
ERYTHROCYTE [DISTWIDTH] IN BLOOD BY AUTOMATED COUNT: 15.5 % (ref 11.5–14.5)
GLUCOSE SERPL-MCNC: 90 MG/DL (ref 74–99)
HCT VFR BLD AUTO: 22.2 % (ref 36–46)
HGB BLD-MCNC: 7.1 G/DL (ref 12–16)
IMM GRANULOCYTES # BLD AUTO: 0.24 X10*3/UL (ref 0–0.5)
IMM GRANULOCYTES NFR BLD AUTO: 1.3 % (ref 0–0.9)
LYMPHOCYTES # BLD AUTO: 0.38 X10*3/UL (ref 0.8–3)
LYMPHOCYTES NFR BLD AUTO: 2.1 %
MAGNESIUM SERPL-MCNC: 1.83 MG/DL (ref 1.6–2.4)
MCH RBC QN AUTO: 29.3 PG (ref 26–34)
MCHC RBC AUTO-ENTMCNC: 32 G/DL (ref 32–36)
MCV RBC AUTO: 92 FL (ref 80–100)
MONOCYTES # BLD AUTO: 1.43 X10*3/UL (ref 0.05–0.8)
MONOCYTES NFR BLD AUTO: 7.8 %
NEUTROPHILS # BLD AUTO: 15.95 X10*3/UL (ref 1.6–5.5)
NEUTROPHILS NFR BLD AUTO: 87 %
NRBC BLD-RTO: 0 /100 WBCS (ref 0–0)
PHOSPHATE SERPL-MCNC: 4 MG/DL (ref 2.5–4.9)
PLATELET # BLD AUTO: 797 X10*3/UL (ref 150–450)
POTASSIUM SERPL-SCNC: 4 MMOL/L (ref 3.5–5.3)
PREALB SERPL-MCNC: 6.6 MG/DL (ref 18–40)
PROT SERPL-MCNC: 5.1 G/DL (ref 6.4–8.2)
RBC # BLD AUTO: 2.42 X10*6/UL (ref 4–5.2)
SODIUM SERPL-SCNC: 133 MMOL/L (ref 136–145)
VANCOMYCIN SERPL-MCNC: 16.4 UG/ML (ref 5–20)
WBC # BLD AUTO: 18.3 X10*3/UL (ref 4.4–11.3)

## 2025-06-04 PROCEDURE — 2500000005 HC RX 250 GENERAL PHARMACY W/O HCPCS: Performed by: SURGERY

## 2025-06-04 PROCEDURE — 2500000004 HC RX 250 GENERAL PHARMACY W/ HCPCS (ALT 636 FOR OP/ED): Performed by: SURGERY

## 2025-06-04 PROCEDURE — 2500000001 HC RX 250 WO HCPCS SELF ADMINISTERED DRUGS (ALT 637 FOR MEDICARE OP): Performed by: SURGERY

## 2025-06-04 PROCEDURE — 83735 ASSAY OF MAGNESIUM: CPT | Performed by: SURGERY

## 2025-06-04 PROCEDURE — 99024 POSTOP FOLLOW-UP VISIT: CPT | Performed by: SURGERY

## 2025-06-04 PROCEDURE — 84100 ASSAY OF PHOSPHORUS: CPT | Performed by: SURGERY

## 2025-06-04 PROCEDURE — 2500000001 HC RX 250 WO HCPCS SELF ADMINISTERED DRUGS (ALT 637 FOR MEDICARE OP): Performed by: INTERNAL MEDICINE

## 2025-06-04 PROCEDURE — 2500000001 HC RX 250 WO HCPCS SELF ADMINISTERED DRUGS (ALT 637 FOR MEDICARE OP): Performed by: STUDENT IN AN ORGANIZED HEALTH CARE EDUCATION/TRAINING PROGRAM

## 2025-06-04 PROCEDURE — 85025 COMPLETE CBC W/AUTO DIFF WBC: CPT | Performed by: SURGERY

## 2025-06-04 PROCEDURE — 2500000002 HC RX 250 W HCPCS SELF ADMINISTERED DRUGS (ALT 637 FOR MEDICARE OP, ALT 636 FOR OP/ED): Performed by: SURGERY

## 2025-06-04 PROCEDURE — 99232 SBSQ HOSP IP/OBS MODERATE 35: CPT | Performed by: STUDENT IN AN ORGANIZED HEALTH CARE EDUCATION/TRAINING PROGRAM

## 2025-06-04 PROCEDURE — 1100000001 HC PRIVATE ROOM DAILY

## 2025-06-04 PROCEDURE — 80053 COMPREHEN METABOLIC PANEL: CPT | Performed by: SURGERY

## 2025-06-04 PROCEDURE — 2500000004 HC RX 250 GENERAL PHARMACY W/ HCPCS (ALT 636 FOR OP/ED): Performed by: INTERNAL MEDICINE

## 2025-06-04 PROCEDURE — 36415 COLL VENOUS BLD VENIPUNCTURE: CPT | Performed by: SURGERY

## 2025-06-04 PROCEDURE — 80202 ASSAY OF VANCOMYCIN: CPT | Performed by: INTERNAL MEDICINE

## 2025-06-04 PROCEDURE — 84134 ASSAY OF PREALBUMIN: CPT | Mod: PARLAB | Performed by: SURGERY

## 2025-06-04 RX ADMIN — SIMETHICONE 40 MG: 20 SUSPENSION/ DROPS ORAL at 16:32

## 2025-06-04 RX ADMIN — PIPERACILLIN SODIUM AND TAZOBACTAM SODIUM 2.25 G: 2; .25 INJECTION, SOLUTION INTRAVENOUS at 21:30

## 2025-06-04 RX ADMIN — Medication 2 L/MIN: at 08:08

## 2025-06-04 RX ADMIN — METOPROLOL TARTRATE 12.5 MG: 25 TABLET, FILM COATED ORAL at 10:01

## 2025-06-04 RX ADMIN — PIPERACILLIN SODIUM AND TAZOBACTAM SODIUM 2.25 G: 2; .25 INJECTION, SOLUTION INTRAVENOUS at 03:50

## 2025-06-04 RX ADMIN — OXYBUTYNIN CHLORIDE 5 MG: 5 TABLET ORAL at 21:30

## 2025-06-04 RX ADMIN — METOPROLOL TARTRATE 12.5 MG: 25 TABLET, FILM COATED ORAL at 21:30

## 2025-06-04 RX ADMIN — SIMETHICONE 40 MG: 20 SUSPENSION/ DROPS ORAL at 14:05

## 2025-06-04 RX ADMIN — OXYBUTYNIN CHLORIDE 5 MG: 5 TABLET ORAL at 10:00

## 2025-06-04 RX ADMIN — HEPARIN SODIUM 5000 UNITS: 5000 INJECTION, SOLUTION INTRAVENOUS; SUBCUTANEOUS at 06:12

## 2025-06-04 RX ADMIN — VANCOMYCIN HYDROCHLORIDE 1000 MG: 1 INJECTION, SOLUTION INTRAVENOUS at 22:34

## 2025-06-04 RX ADMIN — Medication 2 L/MIN: at 10:12

## 2025-06-04 RX ADMIN — SIMETHICONE 40 MG: 20 SUSPENSION/ DROPS ORAL at 21:30

## 2025-06-04 RX ADMIN — HEPARIN SODIUM 5000 UNITS: 5000 INJECTION, SOLUTION INTRAVENOUS; SUBCUTANEOUS at 14:05

## 2025-06-04 RX ADMIN — PIPERACILLIN SODIUM AND TAZOBACTAM SODIUM 2.25 G: 2; .25 INJECTION, SOLUTION INTRAVENOUS at 10:06

## 2025-06-04 RX ADMIN — PIPERACILLIN SODIUM AND TAZOBACTAM SODIUM 2.25 G: 2; .25 INJECTION, SOLUTION INTRAVENOUS at 15:55

## 2025-06-04 RX ADMIN — SIMETHICONE 40 MG: 20 SUSPENSION/ DROPS ORAL at 10:04

## 2025-06-04 RX ADMIN — AMLODIPINE BESYLATE 5 MG: 10 TABLET ORAL at 10:00

## 2025-06-04 RX ADMIN — LIDOCAINE 4% 1 PATCH: 40 PATCH TOPICAL at 10:00

## 2025-06-04 RX ADMIN — PANTOPRAZOLE SODIUM 40 MG: 40 INJECTION, POWDER, FOR SOLUTION INTRAVENOUS at 06:12

## 2025-06-04 RX ADMIN — OXYBUTYNIN CHLORIDE 5 MG: 5 TABLET ORAL at 14:05

## 2025-06-04 RX ADMIN — FLUCONAZOLE 200 MG: 2 INJECTION, SOLUTION INTRAVENOUS at 16:32

## 2025-06-04 RX ADMIN — HEPARIN SODIUM 5000 UNITS: 5000 INJECTION, SOLUTION INTRAVENOUS; SUBCUTANEOUS at 21:30

## 2025-06-04 ASSESSMENT — PAIN SCALES - GENERAL
PAINLEVEL_OUTOF10: 0 - NO PAIN
PAINLEVEL_OUTOF10: 0 - NO PAIN

## 2025-06-04 NOTE — PROGRESS NOTES
NEPHROLOGY PROGRESS NOTE    REASON FOR CONSULT: BRUCE on CKD stage III and hypertension    SUBJECTIVE:  Patient is sitting up in chair.  She has had some loose stools but it is more soft now.  Breathing is getting better.  She is wearing elastic stocking which has helped.    OBJECTIVE:    Visit Vitals  /68 (BP Location: Left arm, Patient Position: Lying)   Pulse 87   Temp 36.2 °C (97.2 °F) (Temporal)   Resp 20          Intake/Output Summary (Last 24 hours) at 6/4/2025 1233  Last data filed at 6/4/2025 0638  Gross per 24 hour   Intake 155.83 ml   Output 1525 ml   Net -1369.17 ml        General: Awake and Alert, In no distress, Cooperative  HEENT: Oral mucosa moist, EOMI  NECK: Supple  CHEST: No crackles, no wheeze, mild tachypnea, decreased air entry  CVS: S1,S2 heard, no rubs, RRR  ABD: Soft, tenderness, BS present  EXT: Bilateral elastic stocking noted, bilateral lower extremity 1-2+ edema    MEDICATION:    Scheduled medications  Scheduled Medications[1]  Continuous medications  Continuous Medications[2]  PRN medications  PRN Medications[3]     RESULTS:    Lab Results   Component Value Date    WBC 18.3 (H) 06/04/2025    HGB 7.1 (L) 06/04/2025    HCT 22.2 (L) 06/04/2025    MCV 92 06/04/2025     (H) 06/04/2025        Lab Results   Component Value Date    CREATININE 1.36 (H) 06/04/2025    BUN 16 06/04/2025     (L) 06/04/2025    K 4.0 06/04/2025    CL 99 06/04/2025    CO2 22 06/04/2025        Radiology Imaging reviewed      ASSESSMENT/PLAN:     1.  BRUCE: Creatinine today at 1.36.  She is off IV fluid.  Did get exposed to contrast.  She had a peak creatinine of 2.3 this admission. Patient is status post Nissen's fundoplication surgery and subsequent partial gastrectomy.  Local measures for the swelling encouraged good protein intake.  Monitor renal function.    2.  Hypertension: Blood pressure is slightly elevated.  On the amlodipine 5 mg daily and metoprolol 12.5 mg twice daily . Continue to hold  losartan.      3.  CKD stage III: Baseline creatinine between 1.1-1.3.  She has longstanding history of hypertension.    4.  Hyponatremia: Patient is volume overloaded.  She has been on full liquid diet serum sodium at 133.  Monitor.    Thank You very much for allowing me to participate in the care of this Patient    This document was created using dragon dictation and may contain unintended error    Jono Cole MD   06/04/25                [1] amLODIPine, 5 mg, oral, Daily  fluconazole, 200 mg, intravenous, q24h  heparin (porcine), 5,000 Units, subcutaneous, q8h  lidocaine, 1 patch, transdermal, Daily  metoprolol tartrate, 12.5 mg, oral, BID  oxyBUTYnin, 5 mg, oral, TID  oxygen, , inhalation, Continuous - Inhalation  pantoprazole, 40 mg, oral, Daily before breakfast   Or  pantoprazole, 40 mg, intravenous, Daily before breakfast  piperacillin-tazobactam, 2.25 g, intravenous, q6h  simethicone, 40 mg, oral, 4x daily  [Held by provider] sulfaSALAzine, 500 mg, oral, 4x daily  vancomycin, 1,000 mg, intravenous, q24h     [2]    [3] PRN medications: benzocaine, bisacodyl, dextrose, dextrose, glucagon, glucagon, loperamide, ondansetron **OR** ondansetron, oxygen, traMADol, vancomycin

## 2025-06-04 NOTE — PROGRESS NOTES
"Dixon Raymond \"Slava" is a 79 y.o. female who presents with No chief complaint on file..    Patient seen resting sitting in chair.  IR was able to remove 0.5 mL of fluid.  Initial fluid growing gram-positive bacteria.    Objective   Vitals:    06/04/25 0437   BP: 161/72   Pulse: 87   Resp: 20   Temp: 36.8 °C (98.2 °F)   SpO2: 94%      Physical Exam  Physical Exam:  Constitutional: frail, awake, alert, no acute distress  ENMT: mucous membranes moist, EOMI, conjunctivae clear  Head/Neck: normocephalic, atraumatic; supple, trachea midline  Respiratory/Thorax: patent airways, CTAB; no wheezes, rales, or rhonchi  Cardiovascular: RRR, no murmur  Gastrointestinal: soft, nondistended, non-tender, bowel sounds appreciated  Extremities: palpable peripheral pulses, no edema or cyanosis  Neurological: AO x3, no focal deficits  Psychological: appropriate mood and behavior  Skin: warm and dry    Assessment/Plan       Ginny Raymond is a 79 y.o. female presenting to the hospital for elective surgical procedure for hiatal hernia.  Patient underwent the procedure.  Postoperatively found to have perforation and peritonitis.  Patient underwent partial gastrectomy.     Acute medical conditions:  #Postop hernia repair, peritonitis  #Diarrhea  #Leukocytosis  -Full liquid diet per general surgery  - Continue antibiotics, ID following patient currently on Vanco, Zosyn, fluconazole with negative C. difficile  - CT abdomen pelvis showed scattered small collections of fluid in the abdominal and pelvic cavity  - IR drained 0.5 cc fluid with gram-positive bacteria growing    #BRUCE on CKD 3  #Hyponatremia  #Hypertension  - Baseline creatinine 1.1-1.3  - Patient's blood pressure currently controlled on 5 mg amlodipine, metoprolol 12.5 mg twice daily.  Continue to hold on losartan  - Nephrology following, hyponatremia in the setting of volume overload, sodium improving 133    #Bilateral pleural effusions and atelectasis  #Acute hypoxic " respiratory failure  - Patient currently on 2 L nasal cannula  - Pulmonology consulted suspicion for degree of fluid overload, no plans for thoracentesis at this time      DVT PPX: Subcu heparin  Diet: N.p.o.  IVF:  Code Status: Full    This is a preliminary note written by the resident. Please wait for attending addendum for finalization of note and recommendations.    Venkat Dawson DO, PhD  Internal Medicine PGY2

## 2025-06-04 NOTE — PROGRESS NOTES
"Beatriz Raymond \"Slava" is a 79 y.o. female on day 12 of admission presenting with Hiatal hernia.    Subjective    Status post IR guided drainage of pelvic fluid collection yesterday.  Cultures from the procedure yielded mixed gram-positive bacteria.  She is on vancomycin.  Afebrile with stable hemodynamics.  Leukocytosis decreased to 18,000 today.  BRUCE with a creatinine of 1.36.    Objective   Physical Exam  Physical Exam:   Constitutional: Well developed, awake/alert/oriented x3, no distress, alert and cooperative  Eyes: PERRL, EOMI, clear sclera  Head/Neck: Neck supple, no apparent injury, No JVD, trachea midline  Respiratory/Thorax: good chest expansion, thorax symmetric  Cardiovascular: Regular, rate and rhythm,  2+ equal pulses of the extremities  Gastrointestinal: Nondistended, soft, minimally tender, no rebound tenderness or guarding, no masses palpable, incisions c/d/i  Musculoskeletal: ROM intact, no joint swelling, normal strength  Extremities: normal extremities, no cyanosis edema, contusions or wounds, no clubbing  Neurological: alert and oriented x3, intact senses,  Psychological: Appropriate mood and behavior  Skin: Warm and dry, no lesions, no rashes      Last Recorded Vitals  Blood pressure 153/68, pulse 87, temperature 36.2 °C (97.2 °F), temperature source Temporal, resp. rate 20, height 1.626 m (5' 4.02\"), weight 69.4 kg (153 lb), SpO2 94%.    Intake/Output last 3 Shifts:  I/O last 3 completed shifts:  In: 502 (7.2 mL/kg) [P.O.:357; I.V.:145 (2.1 mL/kg)]  Out: 1625 (23.4 mL/kg) [Urine:1625 (0.7 mL/kg/hr)]  Weight: 69.4 kg     Assessment: 79F s/p laparoscopic HHR with mesh and Nissen fundoplication on 5/21/25, who developed a leak in the proximal portion of the fundoplication status post diagnostic laparoscopy and partial gastrectomy, and redo fundoplication on 5/20/2025.  Status post IR guided drainage of pelvic fluid collection on 6/3/25.     Plan:  - Encourage PO, soft foods and full liquids as " tolerated  - Continue antibiotics, Vanc/Zosyn/fluconazole, negative for C.diff x3  - Pelvic fluid collection from 6/30/2025 with growth of gram-positive  - Daily labs  - Mechanical and chemical DVT prophylaxis  - Avoid NSAIDs and gabapentin  - Appreciate nephrology recommendations  - Appreciate medicine recommendations  - Antiemetics as needed  - Out of bed to chair, may ambulate  - Discharge planning     I spent 30 minutes in the professional and overall care of this patient.      Eliud Hutton MD

## 2025-06-04 NOTE — CARE PLAN
The patient's goals for the shift include Pain control    The clinical goals for the shift include comfort and safety         52

## 2025-06-04 NOTE — CARE PLAN
The patient's goals for the shift include Pain control    The clinical goals for the shift include comfort and safety    Problem: Pain  Goal: Takes deep breaths with improved pain control throughout the shift  Outcome: Progressing  Goal: Turns in bed with improved pain control throughout the shift  Outcome: Progressing  Goal: Walks with improved pain control throughout the shift  Outcome: Progressing  Goal: Performs ADL's with improved pain control throughout shift  Outcome: Progressing  Goal: Participates in PT with improved pain control throughout the shift  Outcome: Progressing  Goal: Free from opioid side effects throughout the shift  Outcome: Progressing  Goal: Free from acute confusion related to pain meds throughout the shift  Outcome: Progressing     Problem: Skin  Goal: Decreased wound size/increased tissue granulation at next dressing change  Outcome: Progressing  Goal: Participates in plan/prevention/treatment measures  Outcome: Progressing  Goal: Prevent/manage excess moisture  Outcome: Progressing  Goal: Prevent/minimize sheer/friction injuries  Outcome: Progressing  Goal: Promote/optimize nutrition  Outcome: Progressing  Goal: Promote skin healing  Outcome: Progressing     Problem: Fall/Injury  Goal: Not fall by end of shift  Outcome: Progressing     Problem: Pain - Adult  Goal: Verbalizes/displays adequate comfort level or baseline comfort level  Outcome: Progressing     Problem: Safety - Adult  Goal: Free from fall injury  Outcome: Progressing     Problem: Discharge Planning  Goal: Discharge to home or other facility with appropriate resources  Outcome: Progressing     Problem: Chronic Conditions and Co-morbidities  Goal: Patient's chronic conditions and co-morbidity symptoms are monitored and maintained or improved  Outcome: Progressing     Problem: Nutrition  Goal: Nutrient intake appropriate for maintaining nutritional needs  Outcome: Progressing

## 2025-06-04 NOTE — PROGRESS NOTES
Infectious disease progress note  Subjective   Leukocytosis uptrending  Perforation of stomach with peritonitis  Status post hiatal hernia repair and fundoplication on 5-21-25    Antibiotics  Zosyn day 11  Diflucan day 11  Vancomycin day 2    Objective   Range of Vitals (last 24 hours)  Heart Rate:  []   Temp:  [36.2 °C (97.2 °F)-36.8 °C (98.2 °F)]   Resp:  [18-22]   BP: (147-161)/(65-72)   SpO2:  [93 %-96 %]   Daily Weight  05/31/25 : 69.4 kg (153 lb)    Body mass index is 26.25 kg/m².      Physical Exam  Patient walking around still feels tired      Relevant Results  Labs  Lab Results   Component Value Date    WBC 18.3 (H) 06/04/2025    HGB 7.1 (L) 06/04/2025    HCT 22.2 (L) 06/04/2025    MCV 92 06/04/2025     (H) 06/04/2025     Lab Results   Component Value Date    GLUCOSE 90 06/04/2025    CALCIUM 8.0 (L) 06/04/2025     (L) 06/04/2025    K 4.0 06/04/2025    CO2 22 06/04/2025    CL 99 06/04/2025    BUN 16 06/04/2025    CREATININE 1.36 (H) 06/04/2025   ESR: --  Lab Results   Component Value Date    SEDRATE 48 (H) 07/29/2020     Lab Results   Component Value Date    CRP 3.81 (A) 07/29/2020     Lab Results   Component Value Date    ALT 8 06/04/2025    AST 9 06/04/2025    ALKPHOS 131 06/04/2025    BILITOT 0.4 06/04/2025       Microbiology  5- stool PCR's all negative  5- sterile fluid collection no growth     Imaging   6-2-2025 CT chest abdomen pelvis shows postoperative changes of recent Nissen fundoplication.  There is a new hypodensity in the spleen suspicious for infarction, scattered small collections of fluid in the abdominal pelvic cavities with a small hepatic subcapsular collection slightly hyper attenuating rim suggestive of infectious component also.      Assessment/Plan   1.  Continue current broad-spectrum antibiotics  2.  Will follow-up the aspiration cultures 3 adjust antibiotics as needed  WBC is downtrending  Other issues  Rheumatoid arthritis  Sarcoidosis  Recent  abdominal surgery fundoplication      I reviewed and interpreted all lab test imaging studies and documentations from other healthcare providers  I am monitoring for antibiotic side effects and toxicity     June Avina MD

## 2025-06-04 NOTE — PROGRESS NOTES
Vancomycin Dosing by Pharmacy- FOLLOW UP    Beatriz Raymond is a 79 y.o. year old female who Pharmacy has been consulted for vancomycin dosing for abdominal infection. Based on the patient's indication and renal status this patient is being dosed based on a goal AUC of 400-600.     Renal function is currently stable.    Current vancomycin dose: 1000 mg given every 24 hours    Estimated vancomycin AUC on current dose: 587 mg/L.hr     Visit Vitals  /68 (BP Location: Left arm, Patient Position: Lying)   Pulse 87   Temp 36.2 °C (97.2 °F) (Temporal)   Resp 20        Lab Results   Component Value Date    CREATININE 1.36 (H) 2025    CREATININE 1.30 (H) 2025    CREATININE 1.21 (H) 2025    CREATININE 1.11 (H) 2025    CREATININE 1.22 (H) 2025        Patient weight is as follows:   Vitals:    25 0700   Weight: 69.4 kg (153 lb)       Cultures:  Susceptibility data for the encounter in last 14 days.  Collected Specimen Info Organism   25 Fluid from Intra-abdominal Abscess Mixed Gram-Positive Bacteria         I/O last 3 completed shifts:  In: 502 (7.2 mL/kg) [P.O.:357; I.V.:145 (2.1 mL/kg)]  Out: 1625 (23.4 mL/kg) [Urine:1625 (0.7 mL/kg/hr)]  Weight: 69.4 kg   I/O during current shift:  I/O this shift:  In: -   Out: 200 [Urine:200]    Temp (24hrs), Av.4 °C (97.6 °F), Min:36.2 °C (97.2 °F), Max:36.8 °C (98.2 °F)      Assessment/Plan    Within goal AUC range. Continue current vancomycin regimen.    This dosing regimen is predicted by [employer] to result in the following pharmacokinetic parameters:  GNS58-84: 478 mg/L.hr  AUC24,ss: 587 mg/L.hr  Probability of AUC24 > 400: 98 %  Ctrough,ss: 18.6 mg/L  Probability of Ctrough,ss > 20: 39 %    The next level will be obtained on  at AM labs. May be obtained sooner if clinically indicated.   Will continue to monitor renal function daily while on vancomycin and order serum creatinine at least every 48 hours if not already ordered.  Follow  for continued vancomycin needs, clinical response, and signs/symptoms of toxicity.       Daniel J Weiland, PharmD

## 2025-06-05 ENCOUNTER — APPOINTMENT (OUTPATIENT)
Dept: SURGERY | Facility: CLINIC | Age: 80
End: 2025-06-05
Payer: MEDICARE

## 2025-06-05 LAB
ALBUMIN SERPL BCP-MCNC: 2.4 G/DL (ref 3.4–5)
ALP SERPL-CCNC: 120 U/L (ref 33–136)
ALT SERPL W P-5'-P-CCNC: 7 U/L (ref 7–45)
ANION GAP SERPL CALC-SCNC: 15 MMOL/L (ref 10–20)
AST SERPL W P-5'-P-CCNC: 9 U/L (ref 9–39)
BASOPHILS # BLD AUTO: 0.05 X10*3/UL (ref 0–0.1)
BASOPHILS NFR BLD AUTO: 0.3 %
BILIRUB SERPL-MCNC: 0.4 MG/DL (ref 0–1.2)
BUN SERPL-MCNC: 17 MG/DL (ref 6–23)
CALCIUM SERPL-MCNC: 8 MG/DL (ref 8.6–10.3)
CHLORIDE SERPL-SCNC: 99 MMOL/L (ref 98–107)
CO2 SERPL-SCNC: 24 MMOL/L (ref 21–32)
CREAT SERPL-MCNC: 1.38 MG/DL (ref 0.5–1.05)
EGFRCR SERPLBLD CKD-EPI 2021: 39 ML/MIN/1.73M*2
EOSINOPHIL # BLD AUTO: 0.29 X10*3/UL (ref 0–0.4)
EOSINOPHIL NFR BLD AUTO: 1.8 %
ERYTHROCYTE [DISTWIDTH] IN BLOOD BY AUTOMATED COUNT: 15.3 % (ref 11.5–14.5)
GLUCOSE SERPL-MCNC: 112 MG/DL (ref 74–99)
HCT VFR BLD AUTO: 22.7 % (ref 36–46)
HGB BLD-MCNC: 7.1 G/DL (ref 12–16)
IMM GRANULOCYTES # BLD AUTO: 0.17 X10*3/UL (ref 0–0.5)
IMM GRANULOCYTES NFR BLD AUTO: 1.1 % (ref 0–0.9)
LYMPHOCYTES # BLD AUTO: 0.46 X10*3/UL (ref 0.8–3)
LYMPHOCYTES NFR BLD AUTO: 2.9 %
MAGNESIUM SERPL-MCNC: 1.8 MG/DL (ref 1.6–2.4)
MCH RBC QN AUTO: 28.6 PG (ref 26–34)
MCHC RBC AUTO-ENTMCNC: 31.3 G/DL (ref 32–36)
MCV RBC AUTO: 92 FL (ref 80–100)
MONOCYTES # BLD AUTO: 1.42 X10*3/UL (ref 0.05–0.8)
MONOCYTES NFR BLD AUTO: 8.9 %
NEUTROPHILS # BLD AUTO: 13.49 X10*3/UL (ref 1.6–5.5)
NEUTROPHILS NFR BLD AUTO: 85 %
NRBC BLD-RTO: 0 /100 WBCS (ref 0–0)
PHOSPHATE SERPL-MCNC: 3.6 MG/DL (ref 2.5–4.9)
PLATELET # BLD AUTO: 852 X10*3/UL (ref 150–450)
POTASSIUM SERPL-SCNC: 3.7 MMOL/L (ref 3.5–5.3)
PROT SERPL-MCNC: 5.3 G/DL (ref 6.4–8.2)
RBC # BLD AUTO: 2.48 X10*6/UL (ref 4–5.2)
SODIUM SERPL-SCNC: 134 MMOL/L (ref 136–145)
WBC # BLD AUTO: 15.9 X10*3/UL (ref 4.4–11.3)

## 2025-06-05 PROCEDURE — 85025 COMPLETE CBC W/AUTO DIFF WBC: CPT | Performed by: SURGERY

## 2025-06-05 PROCEDURE — 2500000001 HC RX 250 WO HCPCS SELF ADMINISTERED DRUGS (ALT 637 FOR MEDICARE OP): Performed by: STUDENT IN AN ORGANIZED HEALTH CARE EDUCATION/TRAINING PROGRAM

## 2025-06-05 PROCEDURE — 99232 SBSQ HOSP IP/OBS MODERATE 35: CPT | Performed by: STUDENT IN AN ORGANIZED HEALTH CARE EDUCATION/TRAINING PROGRAM

## 2025-06-05 PROCEDURE — 84100 ASSAY OF PHOSPHORUS: CPT | Performed by: SURGERY

## 2025-06-05 PROCEDURE — 36415 COLL VENOUS BLD VENIPUNCTURE: CPT | Performed by: SURGERY

## 2025-06-05 PROCEDURE — 2500000001 HC RX 250 WO HCPCS SELF ADMINISTERED DRUGS (ALT 637 FOR MEDICARE OP): Performed by: INTERNAL MEDICINE

## 2025-06-05 PROCEDURE — 1100000001 HC PRIVATE ROOM DAILY

## 2025-06-05 PROCEDURE — 2500000002 HC RX 250 W HCPCS SELF ADMINISTERED DRUGS (ALT 637 FOR MEDICARE OP, ALT 636 FOR OP/ED): Performed by: SURGERY

## 2025-06-05 PROCEDURE — 2500000004 HC RX 250 GENERAL PHARMACY W/ HCPCS (ALT 636 FOR OP/ED): Performed by: SURGERY

## 2025-06-05 PROCEDURE — 2500000001 HC RX 250 WO HCPCS SELF ADMINISTERED DRUGS (ALT 637 FOR MEDICARE OP): Performed by: SURGERY

## 2025-06-05 PROCEDURE — 2500000005 HC RX 250 GENERAL PHARMACY W/O HCPCS: Performed by: SURGERY

## 2025-06-05 PROCEDURE — 99024 POSTOP FOLLOW-UP VISIT: CPT | Performed by: SURGERY

## 2025-06-05 PROCEDURE — 83735 ASSAY OF MAGNESIUM: CPT | Performed by: SURGERY

## 2025-06-05 PROCEDURE — 2500000004 HC RX 250 GENERAL PHARMACY W/ HCPCS (ALT 636 FOR OP/ED): Performed by: INTERNAL MEDICINE

## 2025-06-05 PROCEDURE — 80053 COMPREHEN METABOLIC PANEL: CPT | Performed by: SURGERY

## 2025-06-05 RX ORDER — LINEZOLID 600 MG/1
600 TABLET, FILM COATED ORAL EVERY 12 HOURS SCHEDULED
Status: DISCONTINUED | OUTPATIENT
Start: 2025-06-05 | End: 2025-06-06 | Stop reason: HOSPADM

## 2025-06-05 RX ORDER — LEVOFLOXACIN 750 MG/1
750 TABLET, FILM COATED ORAL
Status: DISCONTINUED | OUTPATIENT
Start: 2025-06-05 | End: 2025-06-06 | Stop reason: HOSPADM

## 2025-06-05 RX ADMIN — LEVOFLOXACIN 750 MG: 750 TABLET, FILM COATED ORAL at 16:04

## 2025-06-05 RX ADMIN — METOPROLOL TARTRATE 12.5 MG: 25 TABLET, FILM COATED ORAL at 21:23

## 2025-06-05 RX ADMIN — OXYBUTYNIN CHLORIDE 5 MG: 5 TABLET ORAL at 08:35

## 2025-06-05 RX ADMIN — SIMETHICONE 40 MG: 20 SUSPENSION/ DROPS ORAL at 14:37

## 2025-06-05 RX ADMIN — LINEZOLID 600 MG: 600 TABLET, FILM COATED ORAL at 14:37

## 2025-06-05 RX ADMIN — SIMETHICONE 40 MG: 20 SUSPENSION/ DROPS ORAL at 16:04

## 2025-06-05 RX ADMIN — SIMETHICONE 40 MG: 20 SUSPENSION/ DROPS ORAL at 08:35

## 2025-06-05 RX ADMIN — OXYBUTYNIN CHLORIDE 5 MG: 5 TABLET ORAL at 14:37

## 2025-06-05 RX ADMIN — Medication 2 L/MIN: at 08:37

## 2025-06-05 RX ADMIN — AMLODIPINE BESYLATE 5 MG: 10 TABLET ORAL at 08:35

## 2025-06-05 RX ADMIN — PIPERACILLIN SODIUM AND TAZOBACTAM SODIUM 2.25 G: 2; .25 INJECTION, SOLUTION INTRAVENOUS at 09:53

## 2025-06-05 RX ADMIN — LIDOCAINE 4% 1 PATCH: 40 PATCH TOPICAL at 08:35

## 2025-06-05 RX ADMIN — PIPERACILLIN SODIUM AND TAZOBACTAM SODIUM 2.25 G: 2; .25 INJECTION, SOLUTION INTRAVENOUS at 03:30

## 2025-06-05 RX ADMIN — PANTOPRAZOLE SODIUM 40 MG: 40 INJECTION, POWDER, FOR SOLUTION INTRAVENOUS at 06:01

## 2025-06-05 RX ADMIN — HEPARIN SODIUM 5000 UNITS: 5000 INJECTION, SOLUTION INTRAVENOUS; SUBCUTANEOUS at 14:37

## 2025-06-05 RX ADMIN — Medication 2 L/MIN: at 07:47

## 2025-06-05 RX ADMIN — HEPARIN SODIUM 5000 UNITS: 5000 INJECTION, SOLUTION INTRAVENOUS; SUBCUTANEOUS at 05:56

## 2025-06-05 RX ADMIN — LINEZOLID 600 MG: 600 TABLET, FILM COATED ORAL at 21:24

## 2025-06-05 RX ADMIN — METOPROLOL TARTRATE 12.5 MG: 25 TABLET, FILM COATED ORAL at 08:35

## 2025-06-05 RX ADMIN — SIMETHICONE 40 MG: 20 SUSPENSION/ DROPS ORAL at 21:23

## 2025-06-05 RX ADMIN — OXYBUTYNIN CHLORIDE 5 MG: 5 TABLET ORAL at 21:23

## 2025-06-05 RX ADMIN — HEPARIN SODIUM 5000 UNITS: 5000 INJECTION, SOLUTION INTRAVENOUS; SUBCUTANEOUS at 21:23

## 2025-06-05 ASSESSMENT — PAIN SCALES - GENERAL
PAINLEVEL_OUTOF10: 0 - NO PAIN

## 2025-06-05 ASSESSMENT — PAIN SCALES - WONG BAKER: WONGBAKER_NUMERICALRESPONSE: NO HURT

## 2025-06-05 NOTE — PROGRESS NOTES
"Dixon Raymond \"Slava" is a 79 y.o. female who presents with No chief complaint on file..    Patient seen resting sitting in chair.  Discussed how WBC is down trending. States she feels about the same, remains on O2 2L NC.    Objective   Vitals:    06/05/25 0301   BP: 151/73   Pulse: 96   Resp: 16   Temp: 36.6 °C (97.9 °F)   SpO2: 91%      Physical Exam  Physical Exam:  Constitutional: frail, awake, alert, no acute distress  ENMT: mucous membranes moist, EOMI, conjunctivae clear  Head/Neck: normocephalic, atraumatic; supple, trachea midline  Respiratory/Thorax: patent airways, CTAB; no wheezes, rales, or rhonchi  Cardiovascular: RRR, no murmur  Gastrointestinal: soft, nondistended, non-tender, bowel sounds appreciated  Extremities: palpable peripheral pulses, no edema or cyanosis  Neurological: AO x3, no focal deficits  Psychological: appropriate mood and behavior  Skin: warm and dry    Assessment/Plan       Ginny Raymond is a 79 y.o. female presenting to the hospital for elective surgical procedure for hiatal hernia.  Patient underwent the procedure.  Postoperatively found to have perforation and peritonitis.  Patient underwent partial gastrectomy.     Acute medical conditions:  #Postop hernia repair, peritonitis  #Diarrhea  #Leukocytosis, improving  - CT abdomen pelvis showed scattered small collections of fluid in the abdominal and pelvic cavity and IR was able to drain a fluid sample  -Fluid culture growing gram positive bacteria, ID following, continuing Vanc, Zosyn, fluconazole    #BRUCE on CKD 3  #Hyponatremia  #Hypertension  - Baseline creatinine 1.1-1.3  - Patient's blood pressure currently controlled on 5 mg amlodipine, metoprolol 12.5 mg twice daily.  Continue to hold on losartan  - Nephrology following, hyponatremia in the setting of volume overload, sodium improving    #Bilateral pleural effusions and atelectasis  #Acute hypoxic respiratory failure  - Patient currently on 2 L nasal cannula  - " Pulmonology consulted suspicion for degree of fluid overload, no plans for thoracentesis at this time      DVT PPX: Subcu heparin  Diet: Modified diet  IVF:  Code Status: Full    This is a preliminary note written by the resident. Please wait for attending addendum for finalization of note and recommendations.    Venkat Dawson DO, PhD  Internal Medicine PGY2

## 2025-06-05 NOTE — PROGRESS NOTES
Spiritual Care Visit   Notes: Ms. welcomed a visit. This was a patient-centered visit. showed care and concern and offered the opportunity for emotional, spiritual care if needed. I created time and space for active, empathic listening. I intervened with emotional, spiritual care and maintained a non-anxious, nonjudgmental presence. Pt used agency of voice to share their story. They have children. spiritual support as part of a holistic approach to wellness that addressed the whole person. *** was offered the opportunity for a visit for emotional, spiritual support. I was unable to assess at this time. This  offered opportunity for a holistic approach to wellness as whole-person care and allowed for integrative healing of the body, mind and spirit. There are no other needs.  Spiritual Care Request    Reason for Visit:  Routine Visit: Introduction  Continue Visiting: No   Request Received From:  Referral From:   Focus of Care:  Visited With: Patient   Refer to :  Spiritual Care Assessment    Spiritual Assessment:  Patient Spiritual Care Encounters  Coping: Consistently demonstrated  Social Interaction: 100% of the time  Family Spiritual Care Encounters  Family Coping: Accepting  Care Provided:  Intended Effects: Establish rapport and connectedness  Methods: Exploring hope, Offer spiritual/P[employer]alism support, Offer emotional support  Interventions: Explain  role, Share words of hope and inspiration, Prayer for healing  Sense of Community and or Restoration Affiliation:  [employer]ism    Addressed Needs/Concerns and/or Matthew Through:  Restoration Encounters  Restoration Needs: Prayer, Literature  Outcome:  Outcome of Spiritual Care Visit: Trust in self/others/God, Spirituality connected, Comfort/healing presence   Advance Directives:  Spiritual Care Annotation    Annotation:    Patient: Ginny Raymond    Date: 6/5/2025  Time: 11:54 AM  Total time (min.):    I offered instruction on how to  reach out to the Spiritual Care Department for future needs. I remain available upon request.  [facility] Department of Spiritual Care Contact #: [phone]  Signed by: Rev. Pippa Box ThM, MA

## 2025-06-05 NOTE — PROGRESS NOTES
Vancomycin Dosing by Pharmacy- Cessation of Therapy    Consult to pharmacy for vancomycin dosing has been discontinued by the prescriber, pharmacy will sign off at this time.    Please call pharmacy if there are further questions or re-enter a consult if vancomycin is resumed.     Melinda Graham, PharmD

## 2025-06-05 NOTE — PROGRESS NOTES
Infectious disease progress note  Subjective   Leukocytosis   Perforation of stomach with peritonitis  Status post hiatal hernia repair and fundoplication on 5-21-25    Antibiotics  Zosyn day 12  Diflucan day 12 will discontinue  Vancomycin day 3    Objective   Range of Vitals (last 24 hours)  Heart Rate:  [84-96]   Temp:  [36.1 °C (97 °F)-36.6 °C (97.9 °F)]   Resp:  [16-20]   BP: (135-155)/(63-73)   SpO2:  [91 %-97 %]   Daily Weight  05/31/25 : 69.4 kg (153 lb)    Body mass index is 26.25 kg/m².      Physical Exam  Patient sitting in  chair with  by bedside.  She was seen in conjunction with surgery      Relevant Results  Labs  Lab Results   Component Value Date    WBC 15.9 (H) 06/05/2025    HGB 7.1 (L) 06/05/2025    HCT 22.7 (L) 06/05/2025    MCV 92 06/05/2025     (H) 06/05/2025     Lab Results   Component Value Date    GLUCOSE 112 (H) 06/05/2025    CALCIUM 8.0 (L) 06/05/2025     (L) 06/05/2025    K 3.7 06/05/2025    CO2 24 06/05/2025    CL 99 06/05/2025    BUN 17 06/05/2025    CREATININE 1.38 (H) 06/05/2025   ESR: --  Lab Results   Component Value Date    SEDRATE 48 (H) 07/29/2020     Lab Results   Component Value Date    CRP 3.81 (A) 07/29/2020     Lab Results   Component Value Date    ALT 7 06/05/2025    AST 9 06/05/2025    ALKPHOS 120 06/05/2025    BILITOT 0.4 06/05/2025       Microbiology  5- stool PCR's all negative  5- sterile fluid collection no growth     Imaging   6-2-2025 CT chest abdomen pelvis shows postoperative changes of recent Nissen fundoplication.  There is a new hypodensity in the spleen suspicious for infarction, scattered small collections of fluid in the abdominal pelvic cavities with a small hepatic subcapsular collection slightly hyper attenuating rim suggestive of infectious component also.      Assessment/Plan   Patient is okay to discharge from infectious disease standpoint.  She is doing well white count is trending down.  I would recommend sending her  on Zyvox 600 mg p.o. twice daily and levofloxacin 750 mg p.o. every 48 hours for 7 days    Other issues  Rheumatoid arthritis  Sarcoidosis  Recent abdominal surgery fundoplication       I reviewed and interpreted all lab test imaging studies and documentations from other healthcare providers  I am monitoring for antibiotic side effects and toxicity     June Avina MD

## 2025-06-05 NOTE — PROGRESS NOTES
"Beatriz Raymond \"Slava" is a 79 y.o. female on day 13 of admission presenting with Hiatal hernia.    Subjective    Leukocytosis downtrending to 15.9 this morning.  Afebrile with stable hemodynamics.  She is having protein shakes, had 2 yesterday.  Pain is minimal.  She is having bowel movements and passing flatus.    Objective   Physical Exam  Physical Exam:   Constitutional: Well developed, awake/alert/oriented x3, no distress, alert and cooperative  Eyes: PERRL, EOMI, clear sclera  Head/Neck: Neck supple, no apparent injury, No JVD, trachea midline  Respiratory/Thorax: good chest expansion, thorax symmetric  Cardiovascular: Regular, rate and rhythm,  2+ equal pulses of the extremities  Gastrointestinal: Nondistended, soft, minimally tender, no rebound tenderness or guarding, no masses palpable, incisions c/d/i  Musculoskeletal: ROM intact, no joint swelling, normal strength  Extremities: normal extremities, no cyanosis edema, contusions or wounds, no clubbing  Neurological: alert and oriented x3, intact senses,  Psychological: Appropriate mood and behavior  Skin: Warm and dry, no lesions, no rashes      Last Recorded Vitals  Blood pressure 135/63, pulse 96, temperature 36.1 °C (97 °F), resp. rate 18, height 1.626 m (5' 4.02\"), weight 69.4 kg (153 lb), SpO2 97%.    Intake/Output last 3 Shifts:  I/O last 3 completed shifts:  In: - (0 mL/kg)   Out: 1475 (21.3 mL/kg) [Urine:1475 (0.6 mL/kg/hr)]  Weight: 69.4 kg     Assessment: 79F s/p laparoscopic HHR with mesh and Nissen fundoplication on 5/21/25, who developed a leak in the proximal portion of the fundoplication status post diagnostic laparoscopy and partial gastrectomy, and redo fundoplication on 5/20/2025.  Status post IR guided drainage of pelvic fluid collection on 6/3/25.     Plan:  - Encourage PO, soft foods and full liquids as tolerated  - Continue antibiotics, Vanc/Zosyn/fluconazole, negative for C.diff x3  - Pelvic fluid collection from 6/30/2025 with growth of " gram-positive  - Daily labs  - Mechanical and chemical DVT prophylaxis  - Avoid NSAIDs and gabapentin  - Appreciate nephrology recommendations  - Appreciate medicine recommendations  - Antiemetics as needed  - Out of bed to chair, may ambulate  - Discharge planning     I spent 30 minutes in the professional and overall care of this patient.      Eliud Hutton MD

## 2025-06-05 NOTE — PROGRESS NOTES
NEPHROLOGY PROGRESS NOTE    REASON FOR CONSULT: BRUCE on CKD stage III and hypertension    SUBJECTIVE:  Patient is sitting up in chair.  She did walk the hallway with her .  She lives in [facility] and there is tentative plan to discharge her home today.  She is on a soft diet now.  Frequent bowel movement.  Breathing is getting better.  She is wearing elastic stocking which has helped with the leg swelling.    OBJECTIVE:    Visit Vitals  /63 (BP Location: Right arm, Patient Position: Sitting)   Pulse 96   Temp 36.1 °C (97 °F) (Temporal)   Resp 18          Intake/Output Summary (Last 24 hours) at 6/5/2025 1042  Last data filed at 6/5/2025 1012  Gross per 24 hour   Intake 237 ml   Output 1250 ml   Net -1013 ml        General: Awake and Alert, In no distress, Cooperative  HEENT: Oral mucosa moist, EOMI, pallor noted  NECK: Supple  CHEST: No crackles, no wheeze, no tachypnea, decreased air entry  CVS: S1,S2 heard, no rubs, RRR  ABD: Soft, tenderness, BS present  EXT: Bilateral elastic stocking noted, bilateral lower extremity 1-2+ edema    MEDICATION:    Scheduled medications  Scheduled Medications[1]  Continuous medications  Continuous Medications[2]  PRN medications  PRN Medications[3]     RESULTS:    Lab Results   Component Value Date    WBC 15.9 (H) 06/05/2025    HGB 7.1 (L) 06/05/2025    HCT 22.7 (L) 06/05/2025    MCV 92 06/05/2025     (H) 06/05/2025        Lab Results   Component Value Date    CREATININE 1.38 (H) 06/05/2025    BUN 17 06/05/2025     (L) 06/05/2025    K 3.7 06/05/2025    CL 99 06/05/2025    CO2 24 06/05/2025        Radiology Imaging reviewed      ASSESSMENT/PLAN:     1.  BRUCE: Creatinine today at 1.38.  She is off IV fluid.  Did get exposed to contrast.  She had a peak creatinine of 2.3 this admission. Patient is status post Nissen's fundoplication surgery and subsequent partial gastrectomy.  Would avoid diuretics.  Stable for discharge from nephrology standpoint.  Avoid  losartan at time of discharge    2.  Hypertension: Stable.  On the amlodipine 5 mg daily and metoprolol 12.5 mg twice daily . Continue to hold losartan.      3.  CKD stage III: Baseline creatinine between 1.1-1.3.  She has longstanding history of hypertension.    4.  Hyponatremia: Patient is volume overloaded.  serum sodium at 134.  Monitor.  Encourage good protein intake.  Limit the fluid intake    Thank You very much for allowing me to participate in the care of this Patient    This document was created using dragon dictation and may contain unintended error    Jono Cole MD   06/05/25                [1] amLODIPine, 5 mg, oral, Daily  fluconazole, 200 mg, intravenous, q24h  heparin (porcine), 5,000 Units, subcutaneous, q8h  lidocaine, 1 patch, transdermal, Daily  metoprolol tartrate, 12.5 mg, oral, BID  oxyBUTYnin, 5 mg, oral, TID  oxygen, , inhalation, Continuous - Inhalation  pantoprazole, 40 mg, oral, Daily before breakfast   Or  pantoprazole, 40 mg, intravenous, Daily before breakfast  piperacillin-tazobactam, 2.25 g, intravenous, q6h  simethicone, 40 mg, oral, 4x daily  [Held by provider] sulfaSALAzine, 500 mg, oral, 4x daily  vancomycin, 1,000 mg, intravenous, q24h     [2]    [3] PRN medications: benzocaine, bisacodyl, dextrose, dextrose, glucagon, glucagon, loperamide, ondansetron **OR** ondansetron, oxygen, traMADol, vancomycin

## 2025-06-05 NOTE — PROGRESS NOTES
6/5/2025  Followed up with pt and spouse in room, introduced self and explained role.  Pt has a walker at home if needed.  Spouse supportive. Ct Team will continue to follow for any needs upon discharge.   Spoke with RN- stated pt is ambulating.    Nilam Armenta RN TCC

## 2025-06-05 NOTE — CONSULTS
"Nutrition Follow Up Assessment:   Nutrition Assessment         Patient is a 79 y.o. female presenting with hiatal hernia repair      Nutrition History:  Food and Nutrient History: Pt was advanced to a soft and bite sized diet on 6/3.  Pt is eating relatively well and drinking her ensure high protein X 3.       Anthropometrics:  Height: 162.6 cm (5' 4.02\")   Weight: 69.4 kg (153 lb)   BMI (Calculated): 26.25  IBW/kg (Dietitian Calculated): 54 kg  Percent of IBW: 135 %                      Weight History:   Wt Readings from Last 10 Encounters:   05/31/25 69.4 kg (153 lb)   05/15/25 69 kg (152 lb 1.9 oz)   05/15/25 69.9 kg (154 lb)   04/21/25 71.2 kg (157 lb)   03/06/25 74.5 kg (164 lb 4.8 oz)   07/29/20 61.7 kg (136 lb 1.6 oz)         Weight Change %:  Weight History / % Weight Change: wt is down 4.4 kg since admit    Nutrition Focused Physical Exam Findings:    Subcutaneous Fat Loss:   Defer Subcutaneous Fat Loss Assessment: Defer all  Defer All Reason: pt was uncomfortable  Muscle Wasting:  Defer Muscle Wasting Assessment: Defer all  Defer All Reason: pt was uncomfortable  Edema:  Edema Location: some edema  Physical Findings:  Skin: Positive (surgery wounds)    Nutrition Significant Labs:  BMP Trend:   Results from last 7 days   Lab Units 06/04/25  0623 06/03/25  0634 06/02/25  0713 06/01/25  0613   GLUCOSE mg/dL 90 117* 113* 117*   CALCIUM mg/dL 8.0* 8.4* 8.3* 8.3*   SODIUM mmol/L 133* 134* 136 137   POTASSIUM mmol/L 4.0 4.0 4.0 3.8   CO2 mmol/L 22 23 25 23   CHLORIDE mmol/L 99 97* 101 103   BUN mg/dL 16 15 12 12   CREATININE mg/dL 1.36* 1.30* 1.21* 1.11*        Nutrition Specific Medications:  Norvasc; lopressor; protonix; zofran    I/O:   Last BM Date: 06/02/25; Stool Appearance: Liquid, Watery (06/01/25 0900)    Dietary Orders (From admission, onward)       Start     Ordered    06/03/25 1553  Adult diet Regular; Soft and bite sized 6  Diet effective now        Question Answer Comment   Diet type Regular  "   Texture Soft and bite sized 6        06/03/25 1552    05/30/25 1658  Oral nutritional supplements  Until discontinued        Question Answer Comment   Deliver with All meals    Select supplement: Ensure High Protein        05/30/25 1658    05/21/25 1300  May Participate in Room Service  ( ROOM SERVICE MAY PARTICIPATE)  Once        Question:  .  Answer:  Yes    05/21/25 1259                     Estimated Needs:      Method for Estimating Needs: 4435-7571  28-32 price kg of IBW for surgery wound healing     Method for Estimating 24 Hour Protein Needs: 54-65  1-1.2 gm kg of IBW as long as renal function permits     Method for Estimating 24 Hour Fluid Needs: 2501-6898  20-30 ml kg of IBW as medically indicated  Patient on Order Fluid Restriction: No        Nutrition Diagnosis        Nutrition Diagnosis  Patient has Nutrition Diagnosis: Yes  Diagnosis Status (1): Active  Nutrition Diagnosis 1: Increased nutrient needs  Related to (1): physiological causes  As Evidenced by (1): surgery wound healing needs       Nutrition Interventions/Recommendations   Nutrition prescription for oral nutrition    Nutrition Recommendations:  Individualized Nutrition Prescription Provided for : Continue diet level appropriate for pt's surgery, continue ensure high protein X 3 to help meet nutritional needs    Nutrition Interventions/Goals:   Meals and Snacks: Texture-modified diet  Goal: >75% of meals  Medical Food Supplement: Commercial beverage medical food supplement therapy  Goal: >75% of supplements  Coordination of Care with Providers: Nursing, Provider            Nutrition Monitoring and Evaluation   Food/Nutrient Related History Monitoring  Monitoring and Evaluation Plan: Estimated Energy Intake, Fluid intake, Intake / amount of food  Estimated Energy Intake: Energy intake greater or equal to 75% of estimated energy needs  Fluid Intake:  (adequate fluid intake without fluid overload)  Intake / Amount of food: Consumes at least 50%  or more of meals/snacks/supplements, Meets > 75% estimated energy needs    Anthropometric Measurements  Monitoring and Evaluation Plan: Body weight    Biochemical Data, Medical Tests and Procedures  Monitoring and Evaluation Plan: Electrolyte/renal panel, Glucose/endocrine profile  Criteria: improved BMP  Criteria: glucose within desired range              Time Spent (min): 30 minutes

## 2025-06-06 ENCOUNTER — PHARMACY VISIT (OUTPATIENT)
Dept: PHARMACY | Facility: CLINIC | Age: 80
End: 2025-06-06
Payer: COMMERCIAL

## 2025-06-06 VITALS
OXYGEN SATURATION: 92 % | HEIGHT: 64 IN | WEIGHT: 153 LBS | TEMPERATURE: 97.9 F | DIASTOLIC BLOOD PRESSURE: 70 MMHG | RESPIRATION RATE: 16 BRPM | SYSTOLIC BLOOD PRESSURE: 150 MMHG | BODY MASS INDEX: 26.12 KG/M2 | HEART RATE: 92 BPM

## 2025-06-06 LAB
ALBUMIN SERPL BCP-MCNC: 2.3 G/DL (ref 3.4–5)
ALP SERPL-CCNC: 129 U/L (ref 33–136)
ALT SERPL W P-5'-P-CCNC: 7 U/L (ref 7–45)
ANION GAP SERPL CALC-SCNC: 13 MMOL/L (ref 10–20)
AST SERPL W P-5'-P-CCNC: 9 U/L (ref 9–39)
BASOPHILS # BLD AUTO: 0.06 X10*3/UL (ref 0–0.1)
BASOPHILS NFR BLD AUTO: 0.5 %
BILIRUB SERPL-MCNC: 0.3 MG/DL (ref 0–1.2)
BUN SERPL-MCNC: 15 MG/DL (ref 6–23)
CALCIUM SERPL-MCNC: 8.2 MG/DL (ref 8.6–10.3)
CHLORIDE SERPL-SCNC: 101 MMOL/L (ref 98–107)
CO2 SERPL-SCNC: 25 MMOL/L (ref 21–32)
CREAT SERPL-MCNC: 1.36 MG/DL (ref 0.5–1.05)
EGFRCR SERPLBLD CKD-EPI 2021: 40 ML/MIN/1.73M*2
EOSINOPHIL # BLD AUTO: 0.22 X10*3/UL (ref 0–0.4)
EOSINOPHIL NFR BLD AUTO: 1.7 %
ERYTHROCYTE [DISTWIDTH] IN BLOOD BY AUTOMATED COUNT: 15.5 % (ref 11.5–14.5)
GLUCOSE SERPL-MCNC: 98 MG/DL (ref 74–99)
HCT VFR BLD AUTO: 21.4 % (ref 36–46)
HGB BLD-MCNC: 6.8 G/DL (ref 12–16)
IMM GRANULOCYTES # BLD AUTO: 0.14 X10*3/UL (ref 0–0.5)
IMM GRANULOCYTES NFR BLD AUTO: 1.1 % (ref 0–0.9)
LYMPHOCYTES # BLD AUTO: 0.48 X10*3/UL (ref 0.8–3)
LYMPHOCYTES NFR BLD AUTO: 3.7 %
MAGNESIUM SERPL-MCNC: 1.71 MG/DL (ref 1.6–2.4)
MCH RBC QN AUTO: 28.8 PG (ref 26–34)
MCHC RBC AUTO-ENTMCNC: 31.8 G/DL (ref 32–36)
MCV RBC AUTO: 91 FL (ref 80–100)
MONOCYTES # BLD AUTO: 1.14 X10*3/UL (ref 0.05–0.8)
MONOCYTES NFR BLD AUTO: 8.9 %
NEUTROPHILS # BLD AUTO: 10.81 X10*3/UL (ref 1.6–5.5)
NEUTROPHILS NFR BLD AUTO: 84.1 %
NRBC BLD-RTO: 0 /100 WBCS (ref 0–0)
PHOSPHATE SERPL-MCNC: 3.5 MG/DL (ref 2.5–4.9)
PLATELET # BLD AUTO: 815 X10*3/UL (ref 150–450)
POTASSIUM SERPL-SCNC: 3.8 MMOL/L (ref 3.5–5.3)
PROT SERPL-MCNC: 5.3 G/DL (ref 6.4–8.2)
RBC # BLD AUTO: 2.36 X10*6/UL (ref 4–5.2)
SODIUM SERPL-SCNC: 135 MMOL/L (ref 136–145)
WBC # BLD AUTO: 12.9 X10*3/UL (ref 4.4–11.3)

## 2025-06-06 PROCEDURE — 85025 COMPLETE CBC W/AUTO DIFF WBC: CPT | Performed by: SURGERY

## 2025-06-06 PROCEDURE — 2500000001 HC RX 250 WO HCPCS SELF ADMINISTERED DRUGS (ALT 637 FOR MEDICARE OP): Performed by: INTERNAL MEDICINE

## 2025-06-06 PROCEDURE — 99024 POSTOP FOLLOW-UP VISIT: CPT | Performed by: SURGERY

## 2025-06-06 PROCEDURE — 2500000005 HC RX 250 GENERAL PHARMACY W/O HCPCS: Performed by: SURGERY

## 2025-06-06 PROCEDURE — 99232 SBSQ HOSP IP/OBS MODERATE 35: CPT | Performed by: STUDENT IN AN ORGANIZED HEALTH CARE EDUCATION/TRAINING PROGRAM

## 2025-06-06 PROCEDURE — 36415 COLL VENOUS BLD VENIPUNCTURE: CPT | Performed by: SURGERY

## 2025-06-06 PROCEDURE — 84100 ASSAY OF PHOSPHORUS: CPT | Performed by: SURGERY

## 2025-06-06 PROCEDURE — 2500000004 HC RX 250 GENERAL PHARMACY W/ HCPCS (ALT 636 FOR OP/ED): Performed by: SURGERY

## 2025-06-06 PROCEDURE — 2500000002 HC RX 250 W HCPCS SELF ADMINISTERED DRUGS (ALT 637 FOR MEDICARE OP, ALT 636 FOR OP/ED): Performed by: SURGERY

## 2025-06-06 PROCEDURE — RXMED WILLOW AMBULATORY MEDICATION CHARGE

## 2025-06-06 PROCEDURE — 2500000001 HC RX 250 WO HCPCS SELF ADMINISTERED DRUGS (ALT 637 FOR MEDICARE OP): Performed by: SURGERY

## 2025-06-06 PROCEDURE — 83735 ASSAY OF MAGNESIUM: CPT | Performed by: SURGERY

## 2025-06-06 PROCEDURE — 2500000001 HC RX 250 WO HCPCS SELF ADMINISTERED DRUGS (ALT 637 FOR MEDICARE OP): Performed by: STUDENT IN AN ORGANIZED HEALTH CARE EDUCATION/TRAINING PROGRAM

## 2025-06-06 PROCEDURE — 80053 COMPREHEN METABOLIC PANEL: CPT | Performed by: SURGERY

## 2025-06-06 RX ORDER — METOPROLOL TARTRATE 25 MG/1
12.5 TABLET, FILM COATED ORAL 2 TIMES DAILY
Qty: 30 TABLET | Refills: 1 | Status: ON HOLD | OUTPATIENT
Start: 2025-06-06

## 2025-06-06 RX ORDER — LINEZOLID 600 MG/1
600 TABLET, FILM COATED ORAL EVERY 12 HOURS SCHEDULED
Qty: 14 TABLET | Refills: 0 | Status: SHIPPED | OUTPATIENT
Start: 2025-06-06 | End: 2025-06-06

## 2025-06-06 RX ORDER — ONDANSETRON 4 MG/1
4 TABLET, ORALLY DISINTEGRATING ORAL EVERY 8 HOURS PRN
Qty: 30 TABLET | Refills: 0 | Status: ON HOLD | OUTPATIENT
Start: 2025-06-06 | End: 2025-07-06

## 2025-06-06 RX ORDER — METOPROLOL TARTRATE 25 MG/1
12.5 TABLET, FILM COATED ORAL 2 TIMES DAILY
Qty: 30 TABLET | Refills: 1 | Status: SHIPPED | OUTPATIENT
Start: 2025-06-06 | End: 2025-06-06

## 2025-06-06 RX ORDER — TRAMADOL HYDROCHLORIDE 50 MG/1
50 TABLET, FILM COATED ORAL EVERY 6 HOURS PRN
Qty: 10 TABLET | Refills: 0 | Status: SHIPPED | OUTPATIENT
Start: 2025-06-06 | End: 2025-06-06 | Stop reason: HOSPADM

## 2025-06-06 RX ORDER — LIDOCAINE 50 MG/G
1 PATCH TOPICAL DAILY
Qty: 15 PATCH | Refills: 0 | Status: ON HOLD | OUTPATIENT
Start: 2025-06-06

## 2025-06-06 RX ORDER — LINEZOLID 600 MG/1
600 TABLET, FILM COATED ORAL EVERY 12 HOURS SCHEDULED
Qty: 14 TABLET | Refills: 0 | Status: SHIPPED | OUTPATIENT
Start: 2025-06-06 | End: 2025-06-12

## 2025-06-06 RX ORDER — LEVOFLOXACIN 750 MG/1
750 TABLET, FILM COATED ORAL
Qty: 4 TABLET | Refills: 0 | Status: ON HOLD | OUTPATIENT
Start: 2025-06-07 | End: 2025-06-14

## 2025-06-06 RX ORDER — LOPERAMIDE HYDROCHLORIDE 2 MG/1
2 CAPSULE ORAL 4 TIMES DAILY PRN
Qty: 30 CAPSULE | Refills: 0 | Status: SHIPPED | OUTPATIENT
Start: 2025-06-06 | End: 2025-06-06 | Stop reason: HOSPADM

## 2025-06-06 RX ORDER — LEVOFLOXACIN 750 MG/1
750 TABLET, FILM COATED ORAL
Qty: 3 TABLET | Refills: 0 | Status: SHIPPED | OUTPATIENT
Start: 2025-06-07 | End: 2025-06-06

## 2025-06-06 RX ADMIN — METOPROLOL TARTRATE 12.5 MG: 25 TABLET, FILM COATED ORAL at 09:00

## 2025-06-06 RX ADMIN — LIDOCAINE 4% 1 PATCH: 40 PATCH TOPICAL at 09:01

## 2025-06-06 RX ADMIN — HEPARIN SODIUM 5000 UNITS: 5000 INJECTION, SOLUTION INTRAVENOUS; SUBCUTANEOUS at 07:04

## 2025-06-06 RX ADMIN — LINEZOLID 600 MG: 600 TABLET, FILM COATED ORAL at 09:00

## 2025-06-06 RX ADMIN — AMLODIPINE BESYLATE 5 MG: 10 TABLET ORAL at 09:00

## 2025-06-06 RX ADMIN — OXYBUTYNIN CHLORIDE 5 MG: 5 TABLET ORAL at 09:00

## 2025-06-06 RX ADMIN — SIMETHICONE 40 MG: 20 SUSPENSION/ DROPS ORAL at 08:21

## 2025-06-06 RX ADMIN — PANTOPRAZOLE SODIUM 40 MG: 40 TABLET, DELAYED RELEASE ORAL at 07:04

## 2025-06-06 ASSESSMENT — COGNITIVE AND FUNCTIONAL STATUS - GENERAL
CLIMB 3 TO 5 STEPS WITH RAILING: A LITTLE
DRESSING REGULAR LOWER BODY CLOTHING: A LITTLE
DAILY ACTIVITIY SCORE: 23
MOBILITY SCORE: 22
WALKING IN HOSPITAL ROOM: A LITTLE

## 2025-06-06 ASSESSMENT — PAIN SCALES - GENERAL: PAINLEVEL_OUTOF10: 0 - NO PAIN

## 2025-06-06 NOTE — CARE PLAN
The patient's goals for the shift include Pain control    The clinical goals for the shift include comfort and safety      Problem: Pain  Goal: Takes deep breaths with improved pain control throughout the shift  Outcome: Progressing     Problem: Pain  Goal: Turns in bed with improved pain control throughout the shift  Outcome: Progressing     Problem: Pain  Goal: Walks with improved pain control throughout the shift  Outcome: Progressing     Problem: Pain  Goal: Performs ADL's with improved pain control throughout shift  Outcome: Progressing     Problem: Pain  Goal: Free from opioid side effects throughout the shift  Outcome: Progressing     Problem: Pain  Goal: Free from acute confusion related to pain meds throughout the shift  Outcome: Progressing     Problem: Skin  Goal: Decreased wound size/increased tissue granulation at next dressing change  Outcome: Progressing     Problem: Skin  Goal: Participates in plan/prevention/treatment measures  Outcome: Progressing     Problem: Skin  Goal: Prevent/manage excess moisture  Outcome: Progressing     Problem: Skin  Goal: Prevent/minimize sheer/friction injuries  Outcome: Progressing     Problem: Skin  Goal: Promote/optimize nutrition  Outcome: Progressing     Problem: Skin  Goal: Promote skin healing  Outcome: Progressing     Problem: Fall/Injury  Goal: Not fall by end of shift  Outcome: Progressing     Problem: Pain - Adult  Goal: Verbalizes/displays adequate comfort level or baseline comfort level  Outcome: Progressing     Problem: Safety - Adult  Goal: Free from fall injury  Outcome: Progressing     Problem: Discharge Planning  Goal: Discharge to home or other facility with appropriate resources  Outcome: Progressing

## 2025-06-06 NOTE — PROGRESS NOTES
"Beatriz Raymond \"Slava" is a 79 y.o. female on day 14 of admission presenting with Hiatal hernia.  Record reviewed.  Patient has a written discharge order.  This TCC received message from nurse, patient is requesting a hospital bed at home.  Referral placed to [facility].  Nursing and provider updated regarding order and documentation of need for bed in Progress Note, and potential OOP costs, if not covered, via secure message.  [facility] contact info also provided.  Care Transitions will continue to follow.  Paula Arreola, WINIFRED BSN, TCC    "

## 2025-06-06 NOTE — PROGRESS NOTES
"Subjective   Beatriz Raymond \"Slava" is a 79 y.o. female who presents with No chief complaint on file..    Patient states that at this morning.  Patient's white count has continued to improve.  Of note patient's hemoglobin is 6.8 today.  Discussed with primary team about recommendation of 1 unit of blood before discharge.    Objective   Vitals:    06/06/25 0734   BP: 150/70   Pulse:    Resp: 16   Temp: 36.6 °C (97.9 °F)   SpO2: 92%      Physical Exam  Physical Exam:  Constitutional: frail, awake, alert, no acute distress  ENMT: mucous membranes moist, EOMI, conjunctivae clear  Head/Neck: normocephalic, atraumatic; supple, trachea midline  Respiratory/Thorax: patent airways, CTAB; no wheezes, rales, or rhonchi  Cardiovascular: RRR, no murmur  Gastrointestinal: soft, nondistended, non-tender, bowel sounds appreciated  Extremities: palpable peripheral pulses, no edema or cyanosis  Neurological: AO x3, no focal deficits  Psychological: appropriate mood and behavior  Skin: warm and dry    Assessment/Plan       Ginny Raymond is a 79 y.o. female presenting to the hospital for elective surgical procedure for hiatal hernia.  Patient underwent the procedure.  Postoperatively found to have perforation and peritonitis.  Patient underwent partial gastrectomy.     Acute medical conditions:  #Postop hernia repair, peritonitis  #Diarrhea, resolved  #Leukocytosis, improving  - CT abdomen pelvis showed scattered small collections of fluid in the abdominal and pelvic cavity and IR was able to drain a fluid sample  -Fluid culture growing gram positive bacteria, ID following, will be discharged on Zyvox and levofloxacin     #BRUCE on CKD 3, improved  #Hyponatremia  #Hypertension  - Baseline creatinine 1.1-1.3  - Patient's blood pressure currently controlled on 5 mg amlodipine, metoprolol 12.5 mg twice daily.  Continue to hold on losartan  - Nephrology following, hyponatremia in the setting of volume overload, sodium improving    #Bilateral pleural " effusions and atelectasis  #Acute hypoxic respiratory failure  - Patient currently on 2 L nasal cannula  - Pulmonology consulted suspicion for degree of fluid overload, no plans for thoracentesis at this time    #Anemia  -Hemoglobin has continued to trend down, currently 6.8. Discussed with primary team. Recommend giving 1 unit of pRBC as patient meets criteria with hemoglobin less then 7.    DVT PPX: Subcu heparin  Diet: Modified diet  IVF:  Code Status: Full    This is a preliminary note written by the resident. Please wait for attending addendum for finalization of note and recommendations.    Venkat Dawson DO, PhD  Internal Medicine PGY2

## 2025-06-06 NOTE — DISCHARGE SUMMARY
Discharge Diagnosis  Hiatal hernia    Issues Requiring Follow-Up  Post op status and nutrition    Test Results Pending At Discharge  Pending Labs       Order Current Status    Sterile Fluid Culture/Smear Preliminary result            Hospital Course   79-year-old female who underwent an elective laparoscopic hiatal hernia repair with mesh and Nissen fundoplication on 5/21/2025.  On postop day 1 in the postop day 2, she developed tachycardia and altered mentation, and diagnostic workup revealed intra-abdominal free fluid and concern for perforated viscus.  She underwent a diagnostic laparoscopy, takedown of her fundoplication which revealed a leak in the proximal part of her plication, partial gastrectomy, and redo fundoplication on 5/23/2025.  Postoperative course was complicated by an acute kidney injury and prolonged hospitalization given leukocytosis and multiple bouts of diarrhea.  She tested negative for C. difficile multiple times.  She did have persistent leukocytosis, peaking at roughly 22,000.  She was on broad-spectrum antibiotics and ultimately underwent an IR-guided drainage of the pelvic fluid collection on 6/3/2025.  Since then, her leukocytosis has down trended and her antibiotics were tailored as per factious disease recommendations.  On day of discharge, her leukocytosis was 12,900.  Her hemoglobin was noted to be 6.8 today, and she has been anemic for several days with a hemoglobin around 7.  She has no clinical signs of bleeding, no tachycardia, no hypotension.  Discussed at length with the patient and her family at bedside about the risk and benefits of transfusion versus observation.  At this time, we will elect to forego any transfusion of blood products.  The patient will be discharged home today, and she will get a CBC and a CMP before following up in clinic next week.    Pertinent Physical Exam At Time of Discharge  Physical Exam  Physical Exam:   Constitutional: Well developed,  awake/alert/oriented x3, no distress, alert and cooperative  Eyes: PERRL, EOMI, clear sclera  Head/Neck: Neck supple, no apparent injury, No JVD, trachea midline  Respiratory/Thorax: good chest expansion, thorax symmetric  Cardiovascular: Regular, rate and rhythm,  2+ equal pulses of the extremities  Gastrointestinal: Nondistended, soft, minimally tender, no rebound tenderness or guarding, no masses palpable, incisions c/d/i  Musculoskeletal: ROM intact, no joint swelling, normal strength  Extremities: normal extremities, no cyanosis edema, contusions or wounds, no clubbing  Neurological: alert and oriented x3, intact senses,  Psychological: Appropriate mood and behavior  Skin: Warm and dry, no lesions, no rashes      Home Medications     Medication List      START taking these medications     levoFLOXacin 750 mg tablet; Commonly known as: Levaquin; Take 1 tablet   (750 mg) by mouth every other day for 7 days.; Start taking on: June 7, 2025   linezolid 600 mg tablet; Commonly known as: Zyvox; Take 1 tablet (600   mg) by mouth every 12 hours for 7 days.   loperamide 2 mg capsule; Commonly known as: Imodium; Take 1 capsule (2   mg) by mouth 4 times a day as needed for diarrhea.   traMADol 50 mg tablet; Commonly known as: Ultram; Take 1 tablet (50 mg)   by mouth every 6 hours if needed for severe pain (7 - 10) for up to 5   days.     CHANGE how you take these medications     metoprolol tartrate 25 mg tablet; Commonly known as: Lopressor; Take 0.5   tablets (12.5 mg) by mouth 2 times a day.; What changed: when to take this     CONTINUE taking these medications     acetaminophen 650 mg ER tablet; Commonly known as: Tylenol 8 HOUR   amLODIPine 5 mg tablet; Commonly known as: Norvasc   CALCIUM 600 + D(3) ORAL   cholecalciferol 1.25 mg (50,000 units) capsule; Commonly known as:   Vitamin D-3   famotidine 40 mg tablet; Commonly known as: Pepcid   flaxseed oiL 1,000 mg capsule   leflunomide 20 mg tablet; Commonly known as:  Arava   Myrbetriq 25 mg tablet extended release 24 hr; Generic drug: mirabegron   * omeprazole 40 mg DR capsule; Commonly known as: PriLOSEC   * omeprazole 40 mg DR capsule; Commonly known as: PriLOSEC; Take 1   capsule (40 mg) by mouth once daily in the morning. Take before meals. Do   not crush or chew. Open capsule, sprinkle beads on SF jello, pudding or   applesauce.   simvastatin 10 mg tablet; Commonly known as: Zocor   solifenacin 5 mg tablet; Commonly known as: VESIcare  * This list has 2 medication(s) that are the same as other medications   prescribed for you. Read the directions carefully, and ask your doctor or   other care provider to review them with you.     STOP taking these medications     hydroxychloroquine 200 mg tablet; Commonly known as: Plaquenil   losartan 25 mg tablet; Commonly known as: Cozaar   sulfaSALAzine 500 mg tablet; Commonly known as: Azulfidine     ASK your doctor about these medications     oxyCODONE 5 mg/5 mL solution; Commonly known as: Roxicodone; Take 5 mL   (5 mg) by mouth every 6 hours if needed for severe pain (7 - 10) or   moderate pain (4 - 6) for up to 7 days.; Ask about: Should I take this   medication?       Outpatient Follow-Up  Future Appointments   Date Time Provider Department Center   6/12/2025  9:30 AM Alexander Barfield MD [id number] [facility]   7/3/2025  8:30 AM Alexander Barfield MD [id number] [facility]       Eliud Hutton MD

## 2025-06-07 LAB
BACTERIA FLD CULT: ABNORMAL
GRAM STN SPEC: ABNORMAL
GRAM STN SPEC: ABNORMAL

## 2025-06-08 LAB
ATRIAL RATE: 121 BPM
P AXIS: 118 DEGREES
P OFFSET: 194 MS
P ONSET: 147 MS
PR INTERVAL: 132 MS
Q ONSET: 213 MS
QRS COUNT: 20 BEATS
QRS DURATION: 88 MS
QT INTERVAL: 304 MS
QTC CALCULATION(BAZETT): 431 MS
QTC FREDERICIA: 384 MS
R AXIS: 221 DEGREES
T AXIS: 134 DEGREES
T OFFSET: 365 MS
VENTRICULAR RATE: 121 BPM

## 2025-06-09 ENCOUNTER — TELEPHONE (OUTPATIENT)
Dept: SURGERY | Facility: CLINIC | Age: 80
End: 2025-06-09
Payer: MEDICARE

## 2025-06-09 NOTE — PROGRESS NOTES
GENERAL SURGERY CLINIC  FOLLOW UP NOTE      Name: Beatriz Raymond  MRN: [id number]      Index Surgery  Surgeon: Dr. Barfield at [facility]       Date of Surgery: 5/21/25  Surgical Procedure: LAPAROSCOPIC ASSISTED HIATAL HERNIA REPAIR WITH FUNDOPLICATION / EGD / TAP BLOCK  46106 - SC LAPS RPR PARAESPHGL HRNA INCL FUNDPLSTY W/MESH    Date of Surgery: 5/23/25  Surgical Procedure: Other: EXPLORATORY LAPAROSCOPY, SEGMENTAL RESECTION OF STOMACH; EGD; TAP BLOCK  76294- SC LAPS ABD PRTM&OMENTUM DX W/WO SPEC BR/WA SPX     HPI: 79-year old female presenting today for a follow up 3wks s/p elective laparoscopic hiatal hernia repair with mesh and Nissen fundoplication on 5/21/2025. On post op day 3 she was taken back to the OR an underwent a diagnostic laparoscopy, takedown of her fundoplication which revealed a leak in the proximal part of her plication, partial gastrectomy, and redo fundoplication on 5/23/2025.     Hospital Course 5/21-6/6:   79-year-old female who underwent an elective laparoscopic hiatal hernia repair with mesh and Nissen fundoplication on 5/21/2025.  On postop day 1 in the postop day 2, she developed tachycardia and altered mentation, and diagnostic workup revealed intra-abdominal free fluid and concern for perforated viscus.  She underwent a diagnostic laparoscopy, takedown of her fundoplication which revealed a leak in the proximal part of her plication, partial gastrectomy, and redo fundoplication on 5/23/2025.  Postoperative course was complicated by an acute kidney injury and prolonged hospitalization given leukocytosis and multiple bouts of diarrhea.  She tested negative for C. difficile multiple times.  She did have persistent leukocytosis, peaking at roughly 22,000.  She was on broad-spectrum antibiotics and ultimately underwent an IR-guided drainage of the pelvic fluid collection on 6/3/2025.  Since then, her leukocytosis has down trended and her antibiotics were tailored as per factious disease recommendations.   On day of discharge, her leukocytosis was 12,900.  Her hemoglobin was noted to be 6.8 today, and she has been anemic for several days with a hemoglobin around 7.  She has no clinical signs of bleeding, no tachycardia, no hypotension.  Discussed at length with the patient and her family at bedside about the risk and benefits of transfusion versus observation.  At this time, we will elect to forego any transfusion of blood products.  The patient will be discharged home today, and she will get a CBC and a CMP before following up in clinic next week.    Concerns related to:  Nausea/Vomiting, Reflux: Some nausea  Abdominal Pain: Mild abdominal pain  Diarrhea/Constipation frequent bowel movements    REVIEW OF SYSTEMS:  Negative for except some nausea, abdominal pain and frequent bowel movements, lack of appetite  PHYSICAL EXAM:  There were no vitals taken for this visit.  Awake and alert  Anicteric sclera, no ptosis  Unlabored respirations.  Easily conversant without increased respiratory effort  Oriented to person, place, time.  Judgement intact.  Appropriate mood, affect.   Abdomen soft, nondistended, minimal generalized tenderness, no rigidity or guarding  Incisions are healing well, Right flank without any erythema  Bilateral lower extremity edema  ASSESSMENT & PLAN:  79 y.o. female presenting for follow up visit s/p hiatal hernia repair with fundoplication complicated by perforation of wrap followed by abdominal washout and drainage with intra-abdominal fluid collections requiring antibiotics was discharged home in stable condition.    Continues to have poor p.o. intake due to mild nausea and mostly lack of appetite and fullness.    Repeat labs are fairly satisfactory,  Results for orders placed or performed in visit on 06/10/25 (from the past 24 hours)   CBC and Auto Differential   Result Value Ref Range    WHITE BLOOD CELL COUNT 9.0 3.8 - 10.8 Thousand/uL    RED BLOOD CELL COUNT 2.70 (L) 3.80 - 5.10 Million/uL     HEMOGLOBIN 7.6 (L) 11.7 - 15.5 g/dL    HEMATOCRIT 24.5 (L) 35.0 - 45.0 %    MCV 90.7 80.0 - 100.0 fL    MCH 28.1 27.0 - 33.0 pg    MCHC 31.0 (L) 32.0 - 36.0 g/dL    RDW 14.2 11.0 - 15.0 %    PLATELET COUNT 579 (H) 140 - 400 Thousand/uL    MPV 8.0 7.5 - 12.5 fL    ABSOLUTE NEUTROPHILS 7,227 1,500 - 7,800 cells/uL    ABSOLUTE LYMPHOCYTES 819 (L) 850 - 3,900 cells/uL    ABSOLUTE MONOCYTES 576 200 - 950 cells/uL    ABSOLUTE EOSINOPHILS 342 15 - 500 cells/uL    ABSOLUTE BASOPHILS 36 0 - 200 cells/uL    NEUTROPHILS 80.3 %    LYMPHOCYTES 9.1 %    MONOCYTES 6.4 %    EOSINOPHILS 3.8 %    BASOPHILS 0.4 %   Comprehensive metabolic panel   Result Value Ref Range    GLUCOSE 87 65 - 99 mg/dL    UREA NITROGEN (BUN) 25 7 - 25 mg/dL    CREATININE 1.15 (H) 0.60 - 1.00 mg/dL    EGFR 49 (L) > OR = 60 mL/min/1.73m2    SODIUM 137 135 - 146 mmol/L    POTASSIUM 3.6 3.5 - 5.3 mmol/L    CHLORIDE 99 98 - 110 mmol/L    CARBON DIOXIDE 27 20 - 32 mmol/L    ELECTROLYTE BALANCE 11 7 - 17 mmol/L (calc)    CALCIUM 8.3 (L) 8.6 - 10.4 mg/dL    PROTEIN, TOTAL 5.5 (L) 6.1 - 8.1 g/dL    ALBUMIN 2.8 (L) 3.6 - 5.1 g/dL    BILIRUBIN, TOTAL 0.3 0.2 - 1.2 mg/dL    ALKALINE PHOSPHATASE 149 37 - 153 U/L    AST 26 10 - 35 U/L    ALT 22 6 - 29 U/L        Plan:    We discussed the options of increasing p.o. intake versus a gastrostomy tube placed versus Dobbhoff tube.  Is not critical however if she does not improve her p.o. intake she would require some sort of nutritional support.  After discussion she wants to wait for another week at least which I think is okay as long as she does not deteriorate further.    Encouraged her to complete her antibiotics course as advised by infectious disease.    Continue PPI and home medications.  Patient to follow-up next Friday virtually, sooner as needed.

## 2025-06-10 DIAGNOSIS — E86.0 DEHYDRATION: ICD-10-CM

## 2025-06-10 DIAGNOSIS — Z98.84 BARIATRIC SURGERY STATUS: ICD-10-CM

## 2025-06-10 DIAGNOSIS — R35.0 URINARY FREQUENCY: ICD-10-CM

## 2025-06-10 DIAGNOSIS — E86.0 DEHYDRATION: Primary | ICD-10-CM

## 2025-06-10 DIAGNOSIS — Z01.818 PREOPERATIVE CLEARANCE: ICD-10-CM

## 2025-06-10 RX ORDER — EPINEPHRINE 0.3 MG/.3ML
0.3 INJECTION SUBCUTANEOUS EVERY 5 MIN PRN
Status: CANCELLED | OUTPATIENT
Start: 2025-06-10

## 2025-06-10 RX ORDER — ALBUTEROL SULFATE 0.83 MG/ML
3 SOLUTION RESPIRATORY (INHALATION) AS NEEDED
Status: CANCELLED | OUTPATIENT
Start: 2025-06-10

## 2025-06-10 RX ORDER — FAMOTIDINE 10 MG/ML
20 INJECTION, SOLUTION INTRAVENOUS ONCE AS NEEDED
Status: CANCELLED | OUTPATIENT
Start: 2025-06-10

## 2025-06-10 RX ORDER — DIPHENHYDRAMINE HYDROCHLORIDE 50 MG/ML
50 INJECTION, SOLUTION INTRAMUSCULAR; INTRAVENOUS AS NEEDED
Status: CANCELLED | OUTPATIENT
Start: 2025-06-10

## 2025-06-10 NOTE — TELEPHONE ENCOUNTER
Spoke with patient and . IV fluids ordered. Can get in today at [place] at 1:30 or 12:30 tomorrow. It takes more than an hour to get to [place] so patient will come tomorrow. Encouraged to increase fluids as best as she can. Popsicles, jello, etc. Will also check labs tomorrow.    Provided the address to [facility] for the IV infusion, patient to check in with desk for the 12:30 appt.     Can get blood work done in the same building [address] before or after the appt for IV fluids. No appt needed for blood work but the infusion is scheduled.      will  AZO for the frequent urination

## 2025-06-10 NOTE — TELEPHONE ENCOUNTER
"Post-op check in, spoke with patient.     Patient walked to get the phone, sounded winded to me, she said she is not SOB but worn out.     I asked how she is doing and she stated she is not doing good, having a rough time but it's not pain.      #1 Mouth is so dry, nothing seems to help, I can't eat hardly because there is no saliva in my mouth to help wash food down. Take a bite and take water to wash down and it's not good.    #2 incontinence  Setting time every 15 min to go to bathroom to avoid peeing in her diaper.     Urine: \"Not dark yellow\"     Having multiple BM's per day, states it's not diarrhea though      She asked if there is anything we can to do for dehydration to help her mouth. She has been trying to increase fluids but then at night she is up every 45 minutes to use bathroom. Has tried biotene rinse and it did not help.     Does not know how much intake yesterday.     Since about 730-8am today patient has had a glass of water (thinks maybe 12oz) 1 ensure shake.         "

## 2025-06-11 ENCOUNTER — INFUSION (OUTPATIENT)
Dept: INFUSION THERAPY | Facility: CLINIC | Age: 80
End: 2025-06-11
Payer: MEDICARE

## 2025-06-11 VITALS
HEART RATE: 93 BPM | RESPIRATION RATE: 16 BRPM | TEMPERATURE: 98.4 F | OXYGEN SATURATION: 94 % | DIASTOLIC BLOOD PRESSURE: 79 MMHG | SYSTOLIC BLOOD PRESSURE: 184 MMHG

## 2025-06-11 DIAGNOSIS — E86.0 DEHYDRATION: ICD-10-CM

## 2025-06-11 PROCEDURE — 96360 HYDRATION IV INFUSION INIT: CPT | Mod: INF

## 2025-06-11 PROCEDURE — 2500000004 HC RX 250 GENERAL PHARMACY W/ HCPCS (ALT 636 FOR OP/ED): Performed by: SURGERY

## 2025-06-11 PROCEDURE — 96361 HYDRATE IV INFUSION ADD-ON: CPT | Mod: INF

## 2025-06-11 RX ORDER — SODIUM CHLORIDE, SODIUM LACTATE, POTASSIUM CHLORIDE, CALCIUM CHLORIDE 600; 310; 30; 20 MG/100ML; MG/100ML; MG/100ML; MG/100ML
500 INJECTION, SOLUTION INTRAVENOUS CONTINUOUS
Qty: 1000 ML | Refills: 0 | Status: ACTIVE | OUTPATIENT
Start: 2025-06-11

## 2025-06-11 RX ORDER — ALBUTEROL SULFATE 0.83 MG/ML
3 SOLUTION RESPIRATORY (INHALATION) AS NEEDED
OUTPATIENT
Start: 2025-06-11

## 2025-06-11 RX ORDER — SODIUM CHLORIDE, SODIUM LACTATE, POTASSIUM CHLORIDE, CALCIUM CHLORIDE 600; 310; 30; 20 MG/100ML; MG/100ML; MG/100ML; MG/100ML
500 INJECTION, SOLUTION INTRAVENOUS CONTINUOUS
Status: ACTIVE | OUTPATIENT
Start: 2025-06-11 | End: 2025-06-11

## 2025-06-11 RX ORDER — FAMOTIDINE 10 MG/ML
20 INJECTION, SOLUTION INTRAVENOUS ONCE AS NEEDED
OUTPATIENT
Start: 2025-06-11

## 2025-06-11 RX ORDER — EPINEPHRINE 1 MG/ML
0.3 INJECTION, SOLUTION, CONCENTRATE INTRAVENOUS EVERY 5 MIN PRN
OUTPATIENT
Start: 2025-06-11

## 2025-06-11 RX ORDER — SODIUM CHLORIDE, SODIUM LACTATE, POTASSIUM CHLORIDE, CALCIUM CHLORIDE 600; 310; 30; 20 MG/100ML; MG/100ML; MG/100ML; MG/100ML
500 INJECTION, SOLUTION INTRAVENOUS CONTINUOUS
Status: CANCELLED | OUTPATIENT
Start: 2025-06-11

## 2025-06-11 RX ORDER — DIPHENHYDRAMINE HYDROCHLORIDE 50 MG/ML
50 INJECTION, SOLUTION INTRAMUSCULAR; INTRAVENOUS AS NEEDED
OUTPATIENT
Start: 2025-06-11

## 2025-06-11 RX ADMIN — SODIUM CHLORIDE, POTASSIUM CHLORIDE, SODIUM LACTATE AND CALCIUM CHLORIDE 500 ML/HR: 600; 310; 30; 20 INJECTION, SOLUTION INTRAVENOUS at 13:21

## 2025-06-11 NOTE — PROGRESS NOTES
[facility]   Infusion Clinic Note   Date: 2025   Name: Beatriz Raymond  : 1945   MRN: [id number]         Reason for Visit: OP Infusion (fluids)      Accompanied by:   Visit Type:: Infusion   Diagnosis: Dehydration    Allergies:   Allergies as of 2025 - Reviewed 2025   Allergen Reaction Noted    Codeine Nausea/vomiting, Nausea And Vomiting, and Nausea Only 2018    Codeine sulfate Nausea/vomiting 2025      Current Meds has a current medication list which includes the following prescription(s): acetaminophen, amlodipine, calcium carbonate/vitamin d3, cholecalciferol, famotidine, flaxseed oil, hydroxychloroquine, leflunomide, levofloxacin, lidocaine, linezolid, metoprolol tartrate, mirabegron, omeprazole, omeprazole, ondansetron odt, simvastatin, solifenacin, and sulfasalazine.        Vitals:There were no vitals filed for this visit.   Infusion Pre-procedure Checklist   Allergies reviewed: yes   Medications reviewed: yes   Contraindications to treatment:No   Previous reaction to current treatment:No   Current Health Issues: None   Pain: '    Is the pain different from normal: Yes   Is the pain tolerable: n/a   Is your Doctor aware: n/a   Contraindications based on patient history: No   Provider notified: Not applicable   Labs: Labs reviewed   Fall Risk Screening:      Review of Systems - Oncology   Negative for complaint: [] all other systems have been reviewed and are negative for complaint   Infusion Readiness:   Assessment Concerns Related to Infusion: No  Provider notified: n/a  Assess patient for the concerns below. Document provider notification as appropriate:  - Does not meet criteria to treat No  - Has an active or recent infection with/without current antibiotic use N/A  - Has recent/planned dental work N/A  - Has recent/planned surgeries N/A  - Has recently received or plans to receive vaccinations N/A  - Has treatment related toxicities  "No  - Is pregnant (unless noted otherwise) N/A    Initiated By: ALLIE SHI RN   Time: 12:49 PM     Beatriz Raymond \"Ginny\" had no medications administered during this visit.      "

## 2025-06-12 ENCOUNTER — OFFICE VISIT (OUTPATIENT)
Dept: SURGERY | Facility: CLINIC | Age: 80
End: 2025-06-12
Payer: MEDICARE

## 2025-06-12 DIAGNOSIS — N17.9 AKI (ACUTE KIDNEY INJURY): ICD-10-CM

## 2025-06-12 DIAGNOSIS — B37.0 ORAL THRUSH: ICD-10-CM

## 2025-06-12 DIAGNOSIS — Z98.84 BARIATRIC SURGERY STATUS: Primary | ICD-10-CM

## 2025-06-12 DIAGNOSIS — B99.9 INTRA-ABDOMINAL INFECTION: ICD-10-CM

## 2025-06-12 DIAGNOSIS — Z01.818 PREOPERATIVE CLEARANCE: ICD-10-CM

## 2025-06-12 LAB
ALBUMIN SERPL-MCNC: 2.8 G/DL (ref 3.6–5.1)
ALP SERPL-CCNC: 149 U/L (ref 37–153)
ALT SERPL-CCNC: 22 U/L (ref 6–29)
ANION GAP SERPL CALCULATED.4IONS-SCNC: 11 MMOL/L (CALC) (ref 7–17)
AST SERPL-CCNC: 26 U/L (ref 10–35)
BASOPHILS # BLD AUTO: 36 CELLS/UL (ref 0–200)
BASOPHILS NFR BLD AUTO: 0.4 %
BILIRUB SERPL-MCNC: 0.3 MG/DL (ref 0.2–1.2)
BUN SERPL-MCNC: 25 MG/DL (ref 7–25)
CALCIUM SERPL-MCNC: 8.3 MG/DL (ref 8.6–10.4)
CHLORIDE SERPL-SCNC: 99 MMOL/L (ref 98–110)
CO2 SERPL-SCNC: 27 MMOL/L (ref 20–32)
CREAT SERPL-MCNC: 1.15 MG/DL (ref 0.6–1)
EGFRCR SERPLBLD CKD-EPI 2021: 49 ML/MIN/1.73M2
EOSINOPHIL # BLD AUTO: 342 CELLS/UL (ref 15–500)
EOSINOPHIL NFR BLD AUTO: 3.8 %
ERYTHROCYTE [DISTWIDTH] IN BLOOD BY AUTOMATED COUNT: 14.2 % (ref 11–15)
GLUCOSE SERPL-MCNC: 87 MG/DL (ref 65–99)
HCT VFR BLD AUTO: 24.5 % (ref 35–45)
HGB BLD-MCNC: 7.6 G/DL (ref 11.7–15.5)
LYMPHOCYTES # BLD AUTO: 819 CELLS/UL (ref 850–3900)
LYMPHOCYTES NFR BLD AUTO: 9.1 %
MCH RBC QN AUTO: 28.1 PG (ref 27–33)
MCHC RBC AUTO-ENTMCNC: 31 G/DL (ref 32–36)
MCV RBC AUTO: 90.7 FL (ref 80–100)
MONOCYTES # BLD AUTO: 576 CELLS/UL (ref 200–950)
MONOCYTES NFR BLD AUTO: 6.4 %
NEUTROPHILS # BLD AUTO: 7227 CELLS/UL (ref 1500–7800)
NEUTROPHILS NFR BLD AUTO: 80.3 %
PLATELET # BLD AUTO: 579 THOUSAND/UL (ref 140–400)
PMV BLD REES-ECKER: 8 FL (ref 7.5–12.5)
POTASSIUM SERPL-SCNC: 3.6 MMOL/L (ref 3.5–5.3)
PROT SERPL-MCNC: 5.5 G/DL (ref 6.1–8.1)
RBC # BLD AUTO: 2.7 MILLION/UL (ref 3.8–5.1)
SODIUM SERPL-SCNC: 137 MMOL/L (ref 135–146)
WBC # BLD AUTO: 9 THOUSAND/UL (ref 3.8–10.8)

## 2025-06-12 PROCEDURE — 99211 OFF/OP EST MAY X REQ PHY/QHP: CPT | Performed by: SURGERY

## 2025-06-12 PROCEDURE — 1111F DSCHRG MED/CURRENT MED MERGE: CPT | Performed by: SURGERY

## 2025-06-12 RX ORDER — NYSTATIN 100000 [USP'U]/ML
10 SUSPENSION ORAL 4 TIMES DAILY
Qty: 400 ML | Refills: 0 | Status: SHIPPED | OUTPATIENT
Start: 2025-06-12 | End: 2025-06-22

## 2025-06-12 RX ORDER — LINEZOLID 100 MG/5ML
600 GRANULE, FOR SUSPENSION ORAL 2 TIMES DAILY
Qty: 120 ML | Refills: 0 | Status: SHIPPED | OUTPATIENT
Start: 2025-06-12 | End: 2025-06-14

## 2025-06-13 PROBLEM — Z98.890 HISTORY OF REPAIR OF HIATAL HERNIA: Status: ACTIVE | Noted: 2025-06-13

## 2025-06-13 PROBLEM — Z87.19 HISTORY OF REPAIR OF HIATAL HERNIA: Status: ACTIVE | Noted: 2025-06-13

## 2025-06-13 NOTE — PROGRESS NOTES
GENERAL SURGERY CLINIC  FOLLOW UP NOTE      Name: Beatriz Raymond  MRN: [id number]      Index Surgery  Surgeon: Dr. Barfield at [facility]       Date of Surgery: 5/21/25  Surgical Procedure: LAPAROSCOPIC ASSISTED HIATAL HERNIA REPAIR WITH FUNDOPLICATION / EGD / TAP BLOCK  55301 - [place] LAPS RPR PARAESPHGL HRNA INCL FUNDPLSTY W/MESH    Date of Surgery: 5/23/25  Surgical Procedure: Other: EXPLORATORY LAPAROSCOPY, SEGMENTAL RESECTION OF STOMACH; EGD; TAP BLOCK  29147- [place] LAPS ABD PRTM&OMENTUM DX W/WO SPEC BR/WA SPX       HPI: 79 year old female presenting today for a 1 week follow up.  Patient is s/p elective laparoscopic hiatal hernia repair with mesh and Nissen fundoplication on 5/21/2025. On post op day 3 she was taken back to the OR an underwent a diagnostic laparoscopy, takedown of her fundoplication which revealed a leak in the proximal part of her plication, partial gastrectomy, and redo fundoplication on 5/23/2025. Patient has been having a difficult time with PO intake.  Was seen in the infusion center on 6/11/25 for LR infusion.  At last visit patient was counseled to increase proteins to at least 90gm per day and use visual cues such as setting out cups to drink in 1 hour, or setting timers to remind her to drink fluids to maintain hydration.      Virtual or Telephone Consent    {Complete for a virtual or telephone visit:68931}  {Select who provided consent:90034}            HPI 6/12/25: 79-year old female presenting today for a follow up 3wks s/p elective laparoscopic hiatal hernia repair with mesh and Nissen fundoplication on 5/21/2025. On post op day 3 she was taken back to the OR an underwent a diagnostic laparoscopy, takedown of her fundoplication which revealed a leak in the proximal part of her plication, partial gastrectomy, and redo fundoplication on 5/23/2025.     Hospital Course 5/21-6/6:   79-year-old female who underwent an elective laparoscopic hiatal hernia repair with mesh and Nissen fundoplication on  5/21/2025.  On postop day 1 in the postop day 2, she developed tachycardia and altered mentation, and diagnostic workup revealed intra-abdominal free fluid and concern for perforated viscus.  She underwent a diagnostic laparoscopy, takedown of her fundoplication which revealed a leak in the proximal part of her plication, partial gastrectomy, and redo fundoplication on 5/23/2025.  Postoperative course was complicated by an acute kidney injury and prolonged hospitalization given leukocytosis and multiple bouts of diarrhea.  She tested negative for C. difficile multiple times.  She did have persistent leukocytosis, peaking at roughly 22,000.  She was on broad-spectrum antibiotics and ultimately underwent an IR-guided drainage of the pelvic fluid collection on 6/3/2025.  Since then, her leukocytosis has down trended and her antibiotics were tailored as per factious disease recommendations.  On day of discharge, her leukocytosis was 12,900.  Her hemoglobin was noted to be 6.8 today, and she has been anemic for several days with a hemoglobin around 7.  She has no clinical signs of bleeding, no tachycardia, no hypotension.  Discussed at length with the patient and her family at bedside about the risk and benefits of transfusion versus observation.  At this time, we will elect to forego any transfusion of blood products.  The patient will be discharged home today, and she will get a CBC and a CMP before following up in clinic next week.    Concerns related to:  Nausea/Vomiting, Reflux: Some nausea  Abdominal Pain: Mild abdominal pain  Diarrhea/Constipation frequent bowel movements    REVIEW OF SYSTEMS:  Negative for except some nausea, abdominal pain and frequent bowel movements, lack of appetite  PHYSICAL EXAM:  There were no vitals taken for this visit.  Awake and alert  Anicteric sclera, no ptosis  Unlabored respirations.  Easily conversant without increased respiratory effort  Oriented to person, place,  time.  Judgement intact.  Appropriate mood, affect.   Abdomen soft, nondistended, minimal generalized tenderness, no rigidity or guarding  Incisions are healing well, Right flank without any erythema  Bilateral lower extremity edema  ASSESSMENT & PLAN 6/12/25:  79 y.o. female presenting for follow up visit s/p hiatal hernia repair with fundoplication complicated by perforation of wrap followed by abdominal washout and drainage with intra-abdominal fluid collections requiring antibiotics was discharged home in stable condition.    Continues to have poor p.o. intake due to mild nausea and mostly lack of appetite and fullness.    Repeat labs are fairly satisfactory,  No results found for this or any previous visit (from the past 24 hours).       Plan:    We discussed the options of increasing p.o. intake versus a gastrostomy tube placed versus Dobbhoff tube.  Is not critical however if she does not improve her p.o. intake she would require some sort of nutritional support.  After discussion she wants to wait for another week at least which I think is okay as long as she does not deteriorate further.    Encouraged her to complete her antibiotics course as advised by infectious disease.    Continue PPI and home medications.  Patient to follow-up next Friday virtually, sooner as needed.      Assessment and Plan:   79 year old female presenting today for a 1 week follow up.  Patient is s/p elective laparoscopic hiatal hernia repair with mesh and Nissen fundoplication on 5/21/2025. On post op day 3 she was taken back to the OR an underwent a diagnostic laparoscopy, takedown of her fundoplication which revealed a leak in the proximal part of her plication, partial gastrectomy, and redo fundoplication on 5/23/2025. Patient has been having a difficult time with PO intake.  Was seen in the infusion center on 6/11/25 for LR infusion.  At last visit patient was counseled to increase proteins to at least 90gm per day and use  visual cues such as setting out cups to drink in 1 hour, or setting timers to remind her to drink fluids to maintain hydration.      ***

## 2025-06-16 ENCOUNTER — HOSPITAL ENCOUNTER (INPATIENT)
Facility: HOSPITAL | Age: 80
DRG: 391 | End: 2025-06-16
Attending: STUDENT IN AN ORGANIZED HEALTH CARE EDUCATION/TRAINING PROGRAM | Admitting: INTERNAL MEDICINE
Payer: MEDICARE

## 2025-06-16 ENCOUNTER — TELEPHONE (OUTPATIENT)
Dept: SURGERY | Facility: CLINIC | Age: 80
End: 2025-06-16
Payer: MEDICARE

## 2025-06-16 ENCOUNTER — APPOINTMENT (OUTPATIENT)
Dept: RADIOLOGY | Facility: HOSPITAL | Age: 80
DRG: 391 | End: 2025-06-16
Payer: MEDICARE

## 2025-06-16 DIAGNOSIS — R47.02 DYSPHASIA: ICD-10-CM

## 2025-06-16 DIAGNOSIS — R50.9 FEVER, UNSPECIFIED FEVER CAUSE: Primary | ICD-10-CM

## 2025-06-16 DIAGNOSIS — K21.00 GASTROESOPHAGEAL REFLUX DISEASE WITH ESOPHAGITIS, UNSPECIFIED WHETHER HEMORRHAGE: ICD-10-CM

## 2025-06-16 DIAGNOSIS — R19.7 DIARRHEA, UNSPECIFIED TYPE: ICD-10-CM

## 2025-06-16 LAB
ALBUMIN SERPL BCP-MCNC: 3.2 G/DL (ref 3.4–5)
ALP SERPL-CCNC: 120 U/L (ref 33–136)
ALT SERPL W P-5'-P-CCNC: 24 U/L (ref 7–45)
AMYLASE SERPL-CCNC: 40 U/L (ref 29–103)
ANION GAP SERPL CALC-SCNC: 17 MMOL/L (ref 10–20)
AST SERPL W P-5'-P-CCNC: 16 U/L (ref 9–39)
BASOPHILS # BLD AUTO: 0.11 X10*3/UL (ref 0–0.1)
BASOPHILS NFR BLD AUTO: 0.5 %
BILIRUB SERPL-MCNC: 0.3 MG/DL (ref 0–1.2)
BUN SERPL-MCNC: 23 MG/DL (ref 6–23)
CALCIUM SERPL-MCNC: 8.9 MG/DL (ref 8.6–10.3)
CHLORIDE SERPL-SCNC: 98 MMOL/L (ref 98–107)
CO2 SERPL-SCNC: 28 MMOL/L (ref 21–32)
CREAT SERPL-MCNC: 1.21 MG/DL (ref 0.5–1.05)
CRP SERPL-MCNC: 22.24 MG/DL
EGFRCR SERPLBLD CKD-EPI 2021: 46 ML/MIN/1.73M*2
EOSINOPHIL # BLD AUTO: 0.15 X10*3/UL (ref 0–0.4)
EOSINOPHIL NFR BLD AUTO: 0.7 %
ERYTHROCYTE [DISTWIDTH] IN BLOOD BY AUTOMATED COUNT: 16.1 % (ref 11.5–14.5)
ERYTHROCYTE [SEDIMENTATION RATE] IN BLOOD BY WESTERGREN METHOD: 42 MM/H (ref 0–30)
GLUCOSE SERPL-MCNC: 93 MG/DL (ref 74–99)
HCT VFR BLD AUTO: 27.9 % (ref 36–46)
HGB BLD-MCNC: 8.6 G/DL (ref 12–16)
IMM GRANULOCYTES # BLD AUTO: 0.18 X10*3/UL (ref 0–0.5)
IMM GRANULOCYTES NFR BLD AUTO: 0.9 % (ref 0–0.9)
LACTATE SERPL-SCNC: 0.7 MMOL/L (ref 0.4–2)
LIPASE SERPL-CCNC: 29 U/L (ref 9–82)
LYMPHOCYTES # BLD AUTO: 0.98 X10*3/UL (ref 0.8–3)
LYMPHOCYTES NFR BLD AUTO: 4.7 %
MAGNESIUM SERPL-MCNC: 1.71 MG/DL (ref 1.6–2.4)
MCH RBC QN AUTO: 28.2 PG (ref 26–34)
MCHC RBC AUTO-ENTMCNC: 30.8 G/DL (ref 32–36)
MCV RBC AUTO: 92 FL (ref 80–100)
MONOCYTES # BLD AUTO: 1.19 X10*3/UL (ref 0.05–0.8)
MONOCYTES NFR BLD AUTO: 5.7 %
NEUTROPHILS # BLD AUTO: 18.35 X10*3/UL (ref 1.6–5.5)
NEUTROPHILS NFR BLD AUTO: 87.5 %
NRBC BLD-RTO: 0 /100 WBCS (ref 0–0)
PLATELET # BLD AUTO: 432 X10*3/UL (ref 150–450)
POTASSIUM SERPL-SCNC: 3.5 MMOL/L (ref 3.5–5.3)
PROT SERPL-MCNC: 6.8 G/DL (ref 6.4–8.2)
RBC # BLD AUTO: 3.05 X10*6/UL (ref 4–5.2)
SODIUM SERPL-SCNC: 139 MMOL/L (ref 136–145)
WBC # BLD AUTO: 21 X10*3/UL (ref 4.4–11.3)

## 2025-06-16 PROCEDURE — 2550000001 HC RX 255 CONTRASTS: Performed by: NURSE PRACTITIONER

## 2025-06-16 PROCEDURE — 80053 COMPREHEN METABOLIC PANEL: CPT | Performed by: NURSE PRACTITIONER

## 2025-06-16 PROCEDURE — 85025 COMPLETE CBC W/AUTO DIFF WBC: CPT | Performed by: NURSE PRACTITIONER

## 2025-06-16 PROCEDURE — 2500000001 HC RX 250 WO HCPCS SELF ADMINISTERED DRUGS (ALT 637 FOR MEDICARE OP): Performed by: INTERNAL MEDICINE

## 2025-06-16 PROCEDURE — 99222 1ST HOSP IP/OBS MODERATE 55: CPT | Performed by: SURGERY

## 2025-06-16 PROCEDURE — 99285 EMERGENCY DEPT VISIT HI MDM: CPT | Mod: 25 | Performed by: STUDENT IN AN ORGANIZED HEALTH CARE EDUCATION/TRAINING PROGRAM

## 2025-06-16 PROCEDURE — 86140 C-REACTIVE PROTEIN: CPT | Performed by: INTERNAL MEDICINE

## 2025-06-16 PROCEDURE — 85652 RBC SED RATE AUTOMATED: CPT | Performed by: INTERNAL MEDICINE

## 2025-06-16 PROCEDURE — 1100000001 HC PRIVATE ROOM DAILY

## 2025-06-16 PROCEDURE — 99223 1ST HOSP IP/OBS HIGH 75: CPT | Performed by: INTERNAL MEDICINE

## 2025-06-16 PROCEDURE — 36415 COLL VENOUS BLD VENIPUNCTURE: CPT | Performed by: NURSE PRACTITIONER

## 2025-06-16 PROCEDURE — 82150 ASSAY OF AMYLASE: CPT | Performed by: NURSE PRACTITIONER

## 2025-06-16 PROCEDURE — 83690 ASSAY OF LIPASE: CPT | Performed by: NURSE PRACTITIONER

## 2025-06-16 PROCEDURE — 2500000004 HC RX 250 GENERAL PHARMACY W/ HCPCS (ALT 636 FOR OP/ED): Performed by: INTERNAL MEDICINE

## 2025-06-16 PROCEDURE — 83605 ASSAY OF LACTIC ACID: CPT | Performed by: NURSE PRACTITIONER

## 2025-06-16 PROCEDURE — 2500000004 HC RX 250 GENERAL PHARMACY W/ HCPCS (ALT 636 FOR OP/ED): Performed by: STUDENT IN AN ORGANIZED HEALTH CARE EDUCATION/TRAINING PROGRAM

## 2025-06-16 PROCEDURE — 74177 CT ABD & PELVIS W/CONTRAST: CPT | Mod: FOREIGN READ | Performed by: RADIOLOGY

## 2025-06-16 PROCEDURE — 2500000002 HC RX 250 W HCPCS SELF ADMINISTERED DRUGS (ALT 637 FOR MEDICARE OP, ALT 636 FOR OP/ED): Performed by: INTERNAL MEDICINE

## 2025-06-16 PROCEDURE — 83735 ASSAY OF MAGNESIUM: CPT | Performed by: NURSE PRACTITIONER

## 2025-06-16 PROCEDURE — 87040 BLOOD CULTURE FOR BACTERIA: CPT | Mod: PARLAB | Performed by: NURSE PRACTITIONER

## 2025-06-16 PROCEDURE — 71260 CT THORAX DX C+: CPT | Mod: FOREIGN READ | Performed by: RADIOLOGY

## 2025-06-16 PROCEDURE — 74177 CT ABD & PELVIS W/CONTRAST: CPT

## 2025-06-16 RX ORDER — AMLODIPINE BESYLATE 5 MG/1
5 TABLET ORAL 2 TIMES DAILY
Status: DISCONTINUED | OUTPATIENT
Start: 2025-06-16 | End: 2025-06-18

## 2025-06-16 RX ORDER — VANCOMYCIN HYDROCHLORIDE 1 G/200ML
1000 INJECTION, SOLUTION INTRAVENOUS EVERY 24 HOURS
Status: DISCONTINUED | OUTPATIENT
Start: 2025-06-17 | End: 2025-06-18

## 2025-06-16 RX ORDER — SULFASALAZINE 500 MG/1
500 TABLET ORAL 4 TIMES DAILY
Status: DISCONTINUED | OUTPATIENT
Start: 2025-06-17 | End: 2025-06-22

## 2025-06-16 RX ORDER — LEFLUNOMIDE 20 MG/1
20 TABLET ORAL DAILY
Status: DISCONTINUED | OUTPATIENT
Start: 2025-06-17 | End: 2025-06-22

## 2025-06-16 RX ORDER — FAMOTIDINE 20 MG/1
40 TABLET, FILM COATED ORAL DAILY
Status: DISCONTINUED | OUTPATIENT
Start: 2025-06-17 | End: 2025-06-18

## 2025-06-16 RX ORDER — HYDROXYCHLOROQUINE SULFATE 200 MG/1
300 TABLET, FILM COATED ORAL DAILY
Status: DISCONTINUED | OUTPATIENT
Start: 2025-06-17 | End: 2025-06-22

## 2025-06-16 RX ORDER — ONDANSETRON HYDROCHLORIDE 2 MG/ML
4 INJECTION, SOLUTION INTRAVENOUS EVERY 6 HOURS PRN
Status: DISCONTINUED | OUTPATIENT
Start: 2025-06-16 | End: 2025-06-24 | Stop reason: HOSPADM

## 2025-06-16 RX ORDER — METOPROLOL TARTRATE 25 MG/1
12.5 TABLET, FILM COATED ORAL 2 TIMES DAILY
Status: DISCONTINUED | OUTPATIENT
Start: 2025-06-16 | End: 2025-06-18

## 2025-06-16 RX ORDER — PANTOPRAZOLE SODIUM 40 MG/1
40 TABLET, DELAYED RELEASE ORAL
Status: DISCONTINUED | OUTPATIENT
Start: 2025-06-17 | End: 2025-06-18

## 2025-06-16 RX ORDER — MICAFUNGIN SODIUM 100 MG/5ML
100 INJECTION, POWDER, LYOPHILIZED, FOR SOLUTION INTRAVENOUS EVERY 24 HOURS
Status: DISCONTINUED | OUTPATIENT
Start: 2025-06-17 | End: 2025-06-19

## 2025-06-16 RX ORDER — MICAFUNGIN SODIUM 100 MG/5ML
100 INJECTION, POWDER, LYOPHILIZED, FOR SOLUTION INTRAVENOUS ONCE
Status: COMPLETED | OUTPATIENT
Start: 2025-06-16 | End: 2025-06-16

## 2025-06-16 RX ORDER — OXYBUTYNIN CHLORIDE 5 MG/1
5 TABLET ORAL 2 TIMES DAILY
Status: DISCONTINUED | OUTPATIENT
Start: 2025-06-16 | End: 2025-06-18

## 2025-06-16 RX ORDER — SIMVASTATIN 10 MG/1
10 TABLET, FILM COATED ORAL DAILY
Status: DISCONTINUED | OUTPATIENT
Start: 2025-06-17 | End: 2025-06-18

## 2025-06-16 RX ORDER — DEXTROSE MONOHYDRATE, SODIUM CHLORIDE, AND POTASSIUM CHLORIDE 50; 1.49; 9 G/1000ML; G/1000ML; G/1000ML
75 INJECTION, SOLUTION INTRAVENOUS CONTINUOUS
Status: DISCONTINUED | OUTPATIENT
Start: 2025-06-16 | End: 2025-06-19

## 2025-06-16 RX ORDER — VANCOMYCIN HYDROCHLORIDE 1 G/20ML
INJECTION, POWDER, LYOPHILIZED, FOR SOLUTION INTRAVENOUS DAILY PRN
Status: DISCONTINUED | OUTPATIENT
Start: 2025-06-16 | End: 2025-06-18

## 2025-06-16 RX ORDER — ENOXAPARIN SODIUM 100 MG/ML
40 INJECTION SUBCUTANEOUS EVERY 24 HOURS
Status: DISCONTINUED | OUTPATIENT
Start: 2025-06-16 | End: 2025-06-24 | Stop reason: HOSPADM

## 2025-06-16 RX ORDER — ACETAMINOPHEN 325 MG/1
975 TABLET ORAL EVERY 6 HOURS PRN
Status: DISCONTINUED | OUTPATIENT
Start: 2025-06-16 | End: 2025-06-18

## 2025-06-16 RX ADMIN — AMLODIPINE BESYLATE 5 MG: 5 TABLET ORAL at 23:48

## 2025-06-16 RX ADMIN — DEXTROSE, SODIUM CHLORIDE, AND POTASSIUM CHLORIDE 100 ML/HR: 5; .9; .15 INJECTION INTRAVENOUS at 23:24

## 2025-06-16 RX ADMIN — VANCOMYCIN HYDROCHLORIDE 2 G: 10 INJECTION, POWDER, LYOPHILIZED, FOR SOLUTION INTRAVENOUS at 21:46

## 2025-06-16 RX ADMIN — ENOXAPARIN SODIUM 40 MG: 100 INJECTION SUBCUTANEOUS at 23:15

## 2025-06-16 RX ADMIN — SODIUM CHLORIDE, SODIUM LACTATE, POTASSIUM CHLORIDE, AND CALCIUM CHLORIDE 2217 ML: .6; .31; .03; .02 INJECTION, SOLUTION INTRAVENOUS at 19:20

## 2025-06-16 RX ADMIN — PIPERACILLIN SODIUM AND TAZOBACTAM SODIUM 4.5 G: 4; .5 INJECTION, SOLUTION INTRAVENOUS at 20:09

## 2025-06-16 RX ADMIN — MICAFUNGIN SODIUM 100 MG: 100 INJECTION, POWDER, LYOPHILIZED, FOR SOLUTION INTRAVENOUS at 20:42

## 2025-06-16 RX ADMIN — METOPROLOL TARTRATE 12.5 MG: 25 TABLET, FILM COATED ORAL at 23:14

## 2025-06-16 RX ADMIN — OXYBUTYNIN CHLORIDE 5 MG: 5 TABLET ORAL at 23:14

## 2025-06-16 RX ADMIN — IOHEXOL 75 ML: 350 INJECTION, SOLUTION INTRAVENOUS at 15:52

## 2025-06-16 SDOH — SOCIAL STABILITY: SOCIAL INSECURITY: HAVE YOU HAD THOUGHTS OF HARMING ANYONE ELSE?: NO

## 2025-06-16 SDOH — SOCIAL STABILITY: SOCIAL INSECURITY: DO YOU FEEL UNSAFE GOING BACK TO THE PLACE WHERE YOU ARE LIVING?: NO

## 2025-06-16 SDOH — SOCIAL STABILITY: SOCIAL INSECURITY: HAVE YOU HAD ANY THOUGHTS OF HARMING ANYONE ELSE?: NO

## 2025-06-16 SDOH — SOCIAL STABILITY: SOCIAL INSECURITY: ABUSE: ADULT

## 2025-06-16 SDOH — SOCIAL STABILITY: SOCIAL INSECURITY: WERE YOU ABLE TO COMPLETE ALL THE BEHAVIORAL HEALTH SCREENINGS?: YES

## 2025-06-16 SDOH — SOCIAL STABILITY: SOCIAL INSECURITY: ARE YOU OR HAVE YOU BEEN THREATENED OR ABUSED PHYSICALLY, EMOTIONALLY, OR SEXUALLY BY ANYONE?: NO

## 2025-06-16 SDOH — SOCIAL STABILITY: SOCIAL INSECURITY: DOES ANYONE TRY TO KEEP YOU FROM HAVING/CONTACTING OTHER FRIENDS OR DOING THINGS OUTSIDE YOUR HOME?: NO

## 2025-06-16 SDOH — SOCIAL STABILITY: SOCIAL INSECURITY: DO YOU FEEL ANYONE HAS EXPLOITED OR TAKEN ADVANTAGE OF YOU FINANCIALLY OR OF YOUR PERSONAL PROPERTY?: NO

## 2025-06-16 SDOH — SOCIAL STABILITY: SOCIAL INSECURITY: ARE THERE ANY APPARENT SIGNS OF INJURIES/BEHAVIORS THAT COULD BE RELATED TO ABUSE/NEGLECT?: NO

## 2025-06-16 SDOH — SOCIAL STABILITY: SOCIAL INSECURITY: HAS ANYONE EVER THREATENED TO HURT YOUR FAMILY OR YOUR PETS?: NO

## 2025-06-16 ASSESSMENT — PAIN SCALES - GENERAL
PAINLEVEL_OUTOF10: 0 - NO PAIN
PAINLEVEL_OUTOF10: 0 - NO PAIN

## 2025-06-16 ASSESSMENT — ACTIVITIES OF DAILY LIVING (ADL)
JUDGMENT_ADEQUATE_SAFELY_COMPLETE_DAILY_ACTIVITIES: YES
DRESSING YOURSELF: INDEPENDENT
BATHING: NEEDS ASSISTANCE
PATIENT'S MEMORY ADEQUATE TO SAFELY COMPLETE DAILY ACTIVITIES?: YES
TOILETING: NEEDS ASSISTANCE
FEEDING YOURSELF: INDEPENDENT
ADEQUATE_TO_COMPLETE_ADL: YES
GROOMING: INDEPENDENT
WALKS IN HOME: INDEPENDENT

## 2025-06-16 ASSESSMENT — LIFESTYLE VARIABLES
HAVE YOU EVER FELT YOU SHOULD CUT DOWN ON YOUR DRINKING: NO
TOTAL SCORE: 0
EVER FELT BAD OR GUILTY ABOUT YOUR DRINKING: NO
HAVE PEOPLE ANNOYED YOU BY CRITICIZING YOUR DRINKING: NO
HOW OFTEN DO YOU HAVE A DRINK CONTAINING ALCOHOL: NEVER
HOW OFTEN DO YOU HAVE 6 OR MORE DRINKS ON ONE OCCASION: NEVER
SKIP TO QUESTIONS 9-10: 1
AUDIT-C TOTAL SCORE: 0
HOW MANY STANDARD DRINKS CONTAINING ALCOHOL DO YOU HAVE ON A TYPICAL DAY: PATIENT DOES NOT DRINK
AUDIT-C TOTAL SCORE: 0
EVER HAD A DRINK FIRST THING IN THE MORNING TO STEADY YOUR NERVES TO GET RID OF A HANGOVER: NO

## 2025-06-16 ASSESSMENT — PATIENT HEALTH QUESTIONNAIRE - PHQ9
SUM OF ALL RESPONSES TO PHQ9 QUESTIONS 1 & 2: 0
1. LITTLE INTEREST OR PLEASURE IN DOING THINGS: NOT AT ALL
2. FEELING DOWN, DEPRESSED OR HOPELESS: NOT AT ALL

## 2025-06-16 ASSESSMENT — COGNITIVE AND FUNCTIONAL STATUS - GENERAL
TOILETING: A LITTLE
PATIENT BASELINE BEDBOUND: NO
MOVING TO AND FROM BED TO CHAIR: A LITTLE
DAILY ACTIVITIY SCORE: 22
MOBILITY SCORE: 21
WALKING IN HOSPITAL ROOM: A LITTLE
HELP NEEDED FOR BATHING: A LITTLE
CLIMB 3 TO 5 STEPS WITH RAILING: A LITTLE

## 2025-06-16 ASSESSMENT — PAIN - FUNCTIONAL ASSESSMENT
PAIN_FUNCTIONAL_ASSESSMENT: 0-10
PAIN_FUNCTIONAL_ASSESSMENT: 0-10

## 2025-06-16 NOTE — ED TRIAGE NOTES
" TRIAGE NOTE   I saw the patient as the Clinician in Triage and performed a brief history and physical exam, established acuity, and ordered appropriate tests to develop basic plan of care. Patient will be seen by an JOSE, resident and/or physician who will independently evaluate the patient. Please see subsequent provider notes for further details and disposition.     Brief HPI: In brief, Beatriz Raymond \"Slava" is a 79 y.o. female with significant PMH for Anemia, GERD, HLD/HTN/murmur, OA, thyroid nodule, RA, sarcoidosis and IBS underwent robotic assisted hiatal hernia repair with fundoplication per Dr. Barfield on 5/21 presenting to ED today for evaluation of abdominal pain and difficulty swallowing.  For the last couple days the patient has had increasing fatigue, last night a fever of 101, difficulty swallowing, mild dysuria and increasing abdominal pain.  Advised by Dr. Barfield to come to ER for further evaluation. Denies sick contacts.  Denies cough/cold symptoms, chest pain, shortness of breath, nausea/vomiting, hematuria, change in bowel habits or any other complaints.  No smoking, EtOH or IV drugs.  PCP is .     Focused Physical exam:   General: 79-year-old female sitting in wheelchair, appears weak and fatigued but nontoxic.  Awake and alert, orient x 3.  Thin with some muscle wasting.  Lips dry.  Skin: Pale pink, warm and dry.  Cardiac: Tachycardic at 108, regular rhythm.  Pulmonary: Clear bilaterally.  Abdomen: Rounded and soft, generally tender, healing stab wounds from robotic surgery, no signs of infection.    Plan/MDM:    79 y.o. female with significant PMH for Anemia, GERD, HLD/HTN/murmur, OA, thyroid nodule, RA, sarcoidosis and IBS underwent robotic assisted hiatal hernia repair with fundoplication per Dr. Barfield on 5/21 is evaluated at the bedside for increasing abdominal pain, difficulty swallowing, fever, dysuria and weakness/fatigue worsening over the last several days.  On arrival blood pressure " 175/79 with a heart rate of 108.  O2 sat 92 to 94%, afebrile currently.  Lungs clear, abdomen is rounded and soft, generally tender, stab wounds from prior surgery show no evidence of infection.  I spoke with Dr. Hutton fellow for Dr. Barfield, IV established, basic labs, CT chest/abdomen/pelvis with IV/oral contrast and hydration will be performed.  Anticipate admission.  Dr. Hutton will evaluate the patient at the bedside.  Patient is agreeable to this plan.    1430: The patient was examined by Dr. Hutton and results waiting.  The plan is that she should have likely at least 2 L of fluids IV as she is dehydrated.  He will wait to review the scans and make a decision as to whether the patient will be admitted to the bariatric surgery team or admitted to medicine with bariatric surgery on consult.    Please see subsequent provider note for further details and disposition

## 2025-06-16 NOTE — CONSULTS
Reason For Consult  Fevers, lethargy post op    History Of Present Illness  Ginny Raymond is a 79 y.o. female presenting with Who underwent a laparoscopic hiatal hernia repair with mesh and Nissen fundoplication on 5/21/2025.  She developed a leak at the proximal part of her plication, prompting return to the operating room on postop day 2 for exploratory laparoscopy, partial gastrectomy, and redo fundoplication.  Her postoperative course was complicated by an acute kidney injury, multiple bouts of diarrhea, and a sustained leukocytosis peaking at roughly 22,000.  She ultimately underwent IR guided drainage of a pelvic fluid collection on 6/3/2025 and her leukocytosis began to downtrend.  She was discharged home on 6/6/2025.  She followed up in the office on 6/12/2025, instead she was having difficulty taking her oral antibiotics.  She had a small superficial abscess in the right hemiabdomen at one of her port sites, which was drained at the scant amount of seropurulent fluid yielded.  During our discussion in the office, we went over the possibility of needing a feeding tube in the future, if the patient was unable to keep up with her oral nutrition and hydration.    This morning, the patient's  called the office stating that the patient was weaker and more lethargic.  She also spiked a fever to 100.4 °F yesterday.  We urged the patient to come to the ED for evaluation.  On evaluation by the surgical team, the patient is hemodynamically stable and afebrile.  She does have a slight tachycardia with a heart rate of 108.  She does not appear toxic, though states that she has not been keeping up with her hydration and it is hard for her to verbalize exactly why.  She notes that liquids easily passed through when she attempts to drink, and she has no reflux or regurgitation, no nausea or vomiting.  She does note trouble taking pills and states that they get stuck during the oropharyngeal phase of her swallows.  She  does have a leukocytosis to roughly 21,000.  Creatinine at 1.2.  CT scan with oral and IV contrast was obtained and reviewed, she does not appear to have any leak or perforated viscus.  No notable intra-abdominal fluid collections.  She does have pleural effusions, larger on the left side.      Past Medical History  She has a past medical history of Anemia, Arthritis, Cataract, Chronic back pain, Colon polyp (2020), Fibroid, GERD (gastroesophageal reflux disease), Hearing aid worn, Hiatal hernia, HL (hearing loss), HLD (hyperlipidemia), Hypertension, PONV (postoperative nausea and vomiting) (1975), RA (rheumatoid arthritis), Sarcoidosis, Sarcoma (Multi), Sarcoma of right thigh (Multi), Thyroid nodule (2014), and Vision loss.    Surgical History  She has a past surgical history that includes Cholecystectomy (6/8/20); Hysterectomy (7/6/10); Esophagogastroduodenoscopy (4/8/16); Colonoscopy; Upper gastrointestinal endoscopy; Cataract extraction; Hip Arthroplasty; Knee arthroscopy w/ meniscal repair (Left); Leg Surgery (Right); Laparoscopy repair hiatal hernia (05/21/2025); and Exploratory laparotomy (05/23/2025).     Social History  She reports that she has never smoked. She has never used smokeless tobacco. She reports that she does not drink alcohol and does not use drugs.    Family History  Family History[1]     Allergies  Codeine    Review of Systems  Constitutional:  Negative for chills. Positive for fever  HENT:  Negative for facial swelling, nosebleeds, rhinorrhea and sinus pain.    Respiratory:  Negative for apnea, choking and chest tightness.    Cardiovascular:  Negative for chest pain, palpitations and leg swelling.   Gastrointestinal:  Negative for nausea and vomiting.   Endocrine: Negative for polydipsia, polyphagia and polyuria.   Genitourinary:  Negative for difficulty urinating, flank pain and urgency.   Skin:  Negative for color change, pallor and rash.   Neurological:  Negative for tremors,  "light-headedness and numbness.   Psychiatric/Behavioral:  Negative for agitation, behavioral problems, confusion, hallucinations and self-injury.        Physical Exam  Physical Exam:   Constitutional: Well developed, awake/alert/oriented x3, no distress, alert and cooperative  Eyes: PERRL, EOMI, clear sclera  Head/Neck: Neck supple, no apparent injury, No JVD, trachea midline  Respiratory/Thorax: good chest expansion, thorax symmetric  Cardiovascular: Regular, rate and rhythm,  2+ equal pulses of the extremities  Gastrointestinal: Mild distension, soft, minimally tender, no rebound tenderness or guarding, no masses palpable, right abdominal incision with some scan purulent fluid expressed, otherwise swelling on the right hemiabdomen is stable. Other incisions are c/d/i  Musculoskeletal: ROM intact, no joint swelling, normal strength  Extremities: normal extremities, no cyanosis edema, contusions or wounds, no clubbing  Neurological: alert and oriented x3, intact senses,  Psychological: Appropriate mood and behavior  Skin: Warm and dry, no lesions, no rashes       Last Recorded Vitals  Blood pressure (!) 185/86, pulse (!) 111, temperature 36.6 °C (97.9 °F), resp. rate 18, height 1.626 m (5' 4\"), weight 73.9 kg (163 lb), SpO2 (!) 93%.         Assessment/Plan     79-year-old female status post laparoscopic hiatal hernia repair with mesh and Nissen fundoplication on 5/21/2025, complicated by intra-abdominal leak prompting return to the operating room on 5/23/2025 for partial gastrectomy and redo fundoplication.  She is readmitted in the setting of lethargy, poor p.o. intake, and a fever overnight.  She has a leukocytosis of 21,000 without any radiographic evidence of an intra-abdominal process.  Would recommend admission to medicine for workup of her leukocytosis.  Would recommend ID consultation as well as pulmonology consultation regarding possible thoracentesis versus observation of her pleural effusion.  The patient " may also need enteric access given her poor p.o. intake.  I discussed this at length with the patient and her  at bedside, and they are agreeable.  However, would require workup of her leukocytosis prior to placing any feeding tube. In the interim, she may have a soft diet as tolerated. Would urge the patient to drink 3 protein shakes a day as well.    Overall plan of care was discussed with Dr. Barfield, patient's attending surgeon.    I spent 90 minutes in the professional and overall care of this patient.      Eliud Hutton MD         [1]   Family History  Problem Relation Name Age of Onset    Arthritis Mother Ce Kuhn     Hypertension Mother Ce Kuhn     Miscarriages / Stillbirths Mother Ce Kuhn     Cancer Father Danilo Kuhn     Hearing loss Father Danilo Kuhn     Hyperlipidemia Father Danilo Kuhn     Hearing loss Brother Poncho Kuhn     Breast cancer Paternal Grandmother Renetta kuhn     Colon cancer Neg Hx      Celiac disease Neg Hx      Colon polyps Neg Hx      Irritable bowel syndrome Neg Hx      Liver disease Neg Hx      Blood clot Neg Hx

## 2025-06-16 NOTE — TELEPHONE ENCOUNTER
Spoke with Dr. Hutton who advised that patient will need to come to [place] ER for Labs, hydration and CT scan.  Possible feeding tube placement with Dr. Jeff Quan.  He will see the patient in the ER.  Notified  who advised they would be at ER in about 1 hour and  20 minutes. I will call ER triage ahead for the patient.

## 2025-06-16 NOTE — TELEPHONE ENCOUNTER
calling concerned with patient- she is not taking in enough but he can't quantify what she is taking in. Says she is having a hard time taking medication, gagging on tablets and getting stuck. She can't swallow the levfloxacin. He says she is lethargic and had a fever of 100.4 last night. He is very worried. I let him know I would reach out to Dr. Hutton right away, but we may need her to come to [facility] ER, but either Dr. Hutton or I would reach back out. He said best call back would be the home phone at [phone].     Epic secure chat sent to Dr. Hutton, awaiting next steps.

## 2025-06-16 NOTE — PROGRESS NOTES
"Pharmacy Medication History Review    Beatriz Raymond \"Slava" is a 79 y.o. female admitted for No Principal Problem: There is no principal problem currently on the Problem List. Please update the Problem List and refresh.. Pharmacy reviewed the patient's [id number] medications and allergies for accuracy.    The list below reflectives the updated PTA list. Please review each medication in order reconciliation for additional clarification and justification.  Prior to Admission medications    Medication Sig Start Date End Date Taking? Authorizing Provider   calcium carbonate/vitamin D3 (CALCIUM 600 + D,3, ORAL) Take 600 mg by mouth once daily.   Yes Historical Provider, MD   famotidine (Pepcid) 40 mg tablet Take 1 tablet (40 mg) by mouth once daily. 6/28/24  Yes Historical Provider, MD   flaxseed oiL 1,000 mg capsule Take 1 capsule (1,000 mg) by mouth once daily. 7/29/20  Yes Historical Provider, MD   hydroxychloroquine (Plaquenil) 200 mg tablet Take 1.5 tablets (300 mg) by mouth once daily. 4/3/25  Yes Historical Provider, MD   leflunomide (Arava) 20 mg tablet Take 1 tablet (20 mg) by mouth once daily. 4/3/25  Yes Historical Provider, MD   lidocaine (Lidoderm) 5 % patch Place 1 patch over 12 hours on the skin once daily. Apply to painful area 12 hours per day, remove for 12 hours.  Patient taking differently: Place 1 patch on the skin once daily. Abdominal Pain  Apply to painful area 12 hours per day, remove for 12 hours. 6/6/25  Yes Eliud Hutton MD   linezolid (Zyvox) 100 mg/5 mL suspension Take 30 mL (600 mg) by mouth 2 times a day for 2 days. 6/12/25 6/16/25 Yes Eliud Hutton MD   metoprolol tartrate (Lopressor) 25 mg tablet Take 0.5 tablets (12.5 mg) by mouth 2 times a day. 6/6/25  Yes Eliud Hutton MD   nystatin (Mycostatin) 100,000 unit/mL suspension Take 10 mL (1,000,000 Units) by mouth 4 times a day for 10 days. Swish and Swallow as prescribed 6/12/25 6/22/25 Yes Eliud uHtton MD   omeprazole " (PriLOSEC) 40 mg DR capsule Take 1 capsule (40 mg) by mouth once daily in the morning. Take before meals. 7/29/20  Yes Historical Provider, MD   simvastatin (Zocor) 10 mg tablet Take 1 tablet (10 mg) by mouth once daily. 7/29/20  Yes Historical Provider, MD   solifenacin (VESIcare) 5 mg tablet Take 1 tablet (5 mg) by mouth once daily. 4/9/25  Yes Historical Provider, MD   sulfaSALAzine (Azulfidine) 500 mg tablet Take 1 tablet (500 mg) by mouth 4 times a day.   Yes Historical Provider, MD   acetaminophen (Tylenol 8 HOUR) 650 mg ER tablet Take 2 tablets (1,300 mg) by mouth 2 times a day. 7/29/20  yes Historical Provider, MD   amLODIPine (Norvasc) 5 mg tablet Take 1 tablet (5 mg) by mouth 2 times a day.   yes Historical Provider, MD   cholecalciferol (Vitamin D-3) 50,000 unit capsule Take 1 capsule (50,000 Units) by mouth 1 (one) time per week.  Patient not taking: Reported on 6/16/2025 7/29/20  no Historical Provider, MD   levoFLOXacin (Levaquin) 750 mg tablet Take 1 tablet (750 mg) by mouth every other day for 7 days. Do not start before June 7, 2025. 6/7/25 6/14/25 no Eliud Hutton MD   mirabegron (Myrbetriq) 25 mg tablet extended release 24 hr 24 hr tablet Take 2 tablets (50 mg) by mouth once daily.  Patient not taking: Reported on 6/16/2025 12/13/23  no Historical Provider, MD   omeprazole (PriLOSEC) 40 mg DR capsule Take 1 capsule (40 mg) by mouth once daily in the morning. Take before meals. Do not crush or chew. Open capsule, sprinkle beads on SF jello, pudding or applesauce. 5/15/25 6/14/25 no Eliud Hutton MD   ondansetron ODT (Zofran-ODT) 4 mg disintegrating tablet Dissolve 1 tablet (4 mg) in the mouth every 8 hours if needed for nausea or vomiting.  Patient not taking: Reported on 6/16/2025 6/6/25 7/6/25 no Eliud Hutton MD   Ringer's solution,lactated (lactated Ringer's) infusion Infuse 500 mL/hr at 500 mL/hr into a venous catheter continuously.  Patient not taking: Reported on 6/16/2025 6/11/25  no  Eliud Hutton MD   linezolid (Zyvox) 600 mg tablet Take 1 tablet (600 mg) by mouth every 12 hours. 6/6/25 6/12/25 no Eliud Hutton MD        The list below reflectives the updated allergy list. Please review each documented allergy for additional clarification and justification.  Allergies  Reviewed by Robbin Wisdom RN on 6/16/2025        Severity Reactions Comments    Codeine High Nausea And Vomiting             Below are additional concerns with the patient's PTA list.    Patient has been unable to swallow pills since Saturday 06/14/2025.    Caroline Salcido

## 2025-06-16 NOTE — ED TRIAGE NOTES
Pt complains of fever that started yesterday, sp hiatal hernia report 5/21 and 5/23. Called surgeon and was told to go ED for eval.  Pt complains of mild abd pain.  ;

## 2025-06-16 NOTE — Clinical Note
USING CRE FIXED WIRE ESOPHAGEAL BALLOON DILATATION CATHETER, INFLATED BALLOON TO 15MM, HELD X 30 SEC'S/DEFLATED

## 2025-06-17 PROBLEM — R47.02 DYSPHASIA: Status: ACTIVE | Noted: 2025-06-17

## 2025-06-17 LAB
ALBUMIN SERPL BCP-MCNC: 2.8 G/DL (ref 3.4–5)
ALP SERPL-CCNC: 111 U/L (ref 33–136)
ALT SERPL W P-5'-P-CCNC: 20 U/L (ref 7–45)
AMORPH CRY #/AREA UR COMP ASSIST: ABNORMAL /HPF
ANION GAP SERPL CALC-SCNC: 14 MMOL/L (ref 10–20)
APPEARANCE UR: CLEAR
AST SERPL W P-5'-P-CCNC: 14 U/L (ref 9–39)
BACTERIA #/AREA URNS AUTO: ABNORMAL /HPF
BILIRUB SERPL-MCNC: 0.3 MG/DL (ref 0–1.2)
BILIRUB UR STRIP.AUTO-MCNC: NEGATIVE MG/DL
BUN SERPL-MCNC: 16 MG/DL (ref 6–23)
CALCIUM SERPL-MCNC: 8.4 MG/DL (ref 8.6–10.3)
CAOX CRY #/AREA UR COMP ASSIST: ABNORMAL /HPF
CHLORIDE SERPL-SCNC: 101 MMOL/L (ref 98–107)
CO2 SERPL-SCNC: 26 MMOL/L (ref 21–32)
COLOR UR: COLORLESS
CREAT SERPL-MCNC: 1.1 MG/DL (ref 0.5–1.05)
EGFRCR SERPLBLD CKD-EPI 2021: 52 ML/MIN/1.73M*2
ERYTHROCYTE [DISTWIDTH] IN BLOOD BY AUTOMATED COUNT: 15.9 % (ref 11.5–14.5)
GLUCOSE BLD MANUAL STRIP-MCNC: 117 MG/DL (ref 74–99)
GLUCOSE BLD MANUAL STRIP-MCNC: 130 MG/DL (ref 74–99)
GLUCOSE SERPL-MCNC: 116 MG/DL (ref 74–99)
GLUCOSE UR STRIP.AUTO-MCNC: NORMAL MG/DL
HCT VFR BLD AUTO: 25.6 % (ref 36–46)
HGB BLD-MCNC: 7.8 G/DL (ref 12–16)
HOLD SPECIMEN: 293
HOLD SPECIMEN: NORMAL
KETONES UR STRIP.AUTO-MCNC: NEGATIVE MG/DL
LEUKOCYTE ESTERASE UR QL STRIP.AUTO: NEGATIVE
MCH RBC QN AUTO: 27.6 PG (ref 26–34)
MCHC RBC AUTO-ENTMCNC: 30.5 G/DL (ref 32–36)
MCV RBC AUTO: 91 FL (ref 80–100)
MUCOUS THREADS #/AREA URNS AUTO: ABNORMAL /LPF
NITRITE UR QL STRIP.AUTO: NEGATIVE
NRBC BLD-RTO: 0 /100 WBCS (ref 0–0)
PH UR STRIP.AUTO: 5.5 [PH]
PLATELET # BLD AUTO: 458 X10*3/UL (ref 150–450)
POTASSIUM SERPL-SCNC: 3.4 MMOL/L (ref 3.5–5.3)
PREALB SERPL-MCNC: 11.8 MG/DL (ref 18–40)
PROCALCITONIN SERPL-MCNC: 0.25 NG/ML
PROT SERPL-MCNC: 6.1 G/DL (ref 6.4–8.2)
PROT UR STRIP.AUTO-MCNC: ABNORMAL MG/DL
RBC # BLD AUTO: 2.83 X10*6/UL (ref 4–5.2)
RBC # UR STRIP.AUTO: NEGATIVE MG/DL
RBC #/AREA URNS AUTO: ABNORMAL /HPF
SODIUM SERPL-SCNC: 138 MMOL/L (ref 136–145)
SP GR UR STRIP.AUTO: 1.02
UROBILINOGEN UR STRIP.AUTO-MCNC: NORMAL MG/DL
VANCOMYCIN SERPL-MCNC: 17.6 UG/ML (ref 5–20)
WBC # BLD AUTO: 17 X10*3/UL (ref 4.4–11.3)
WBC #/AREA URNS AUTO: ABNORMAL /HPF

## 2025-06-17 PROCEDURE — 87631 RESP VIRUS 3-5 TARGETS: CPT | Mod: PARLAB | Performed by: INTERNAL MEDICINE

## 2025-06-17 PROCEDURE — 85027 COMPLETE CBC AUTOMATED: CPT | Performed by: INTERNAL MEDICINE

## 2025-06-17 PROCEDURE — 80202 ASSAY OF VANCOMYCIN: CPT | Performed by: INTERNAL MEDICINE

## 2025-06-17 PROCEDURE — 87637 SARSCOV2&INF A&B&RSV AMP PRB: CPT | Mod: PARLAB | Performed by: INTERNAL MEDICINE

## 2025-06-17 PROCEDURE — 84075 ASSAY ALKALINE PHOSPHATASE: CPT | Performed by: INTERNAL MEDICINE

## 2025-06-17 PROCEDURE — 87506 IADNA-DNA/RNA PROBE TQ 6-11: CPT | Mod: PARLAB

## 2025-06-17 PROCEDURE — 99024 POSTOP FOLLOW-UP VISIT: CPT | Performed by: SURGERY

## 2025-06-17 PROCEDURE — 84134 ASSAY OF PREALBUMIN: CPT | Mod: PARLAB | Performed by: SURGERY

## 2025-06-17 PROCEDURE — 2500000002 HC RX 250 W HCPCS SELF ADMINISTERED DRUGS (ALT 637 FOR MEDICARE OP, ALT 636 FOR OP/ED): Performed by: INTERNAL MEDICINE

## 2025-06-17 PROCEDURE — 1100000001 HC PRIVATE ROOM DAILY

## 2025-06-17 PROCEDURE — 36415 COLL VENOUS BLD VENIPUNCTURE: CPT | Performed by: INTERNAL MEDICINE

## 2025-06-17 PROCEDURE — 82947 ASSAY GLUCOSE BLOOD QUANT: CPT

## 2025-06-17 PROCEDURE — 84145 PROCALCITONIN (PCT): CPT | Mod: PARLAB | Performed by: INTERNAL MEDICINE

## 2025-06-17 PROCEDURE — 2500000004 HC RX 250 GENERAL PHARMACY W/ HCPCS (ALT 636 FOR OP/ED): Performed by: INTERNAL MEDICINE

## 2025-06-17 PROCEDURE — 99233 SBSQ HOSP IP/OBS HIGH 50: CPT | Performed by: INTERNAL MEDICINE

## 2025-06-17 PROCEDURE — 2500000001 HC RX 250 WO HCPCS SELF ADMINISTERED DRUGS (ALT 637 FOR MEDICARE OP): Performed by: INTERNAL MEDICINE

## 2025-06-17 PROCEDURE — 99223 1ST HOSP IP/OBS HIGH 75: CPT | Performed by: PHYSICIAN ASSISTANT

## 2025-06-17 PROCEDURE — 87493 C DIFF AMPLIFIED PROBE: CPT | Mod: PARLAB

## 2025-06-17 PROCEDURE — 2500000001 HC RX 250 WO HCPCS SELF ADMINISTERED DRUGS (ALT 637 FOR MEDICARE OP)

## 2025-06-17 PROCEDURE — 81001 URINALYSIS AUTO W/SCOPE: CPT | Performed by: NURSE PRACTITIONER

## 2025-06-17 RX ORDER — POTASSIUM CHLORIDE 1.5 G/1.58G
20 POWDER, FOR SOLUTION ORAL ONCE
Status: DISCONTINUED | OUTPATIENT
Start: 2025-06-17 | End: 2025-06-17

## 2025-06-17 RX ORDER — POTASSIUM CHLORIDE 1.5 G/1.58G
40 POWDER, FOR SOLUTION ORAL ONCE
Status: COMPLETED | OUTPATIENT
Start: 2025-06-17 | End: 2025-06-17

## 2025-06-17 RX ADMIN — ENOXAPARIN SODIUM 40 MG: 100 INJECTION SUBCUTANEOUS at 22:57

## 2025-06-17 RX ADMIN — PIPERACILLIN SODIUM AND TAZOBACTAM SODIUM 3.38 G: 3; .375 INJECTION, SOLUTION INTRAVENOUS at 21:01

## 2025-06-17 RX ADMIN — FAMOTIDINE 40 MG: 20 TABLET, FILM COATED ORAL at 10:55

## 2025-06-17 RX ADMIN — METOPROLOL TARTRATE 12.5 MG: 25 TABLET, FILM COATED ORAL at 21:02

## 2025-06-17 RX ADMIN — PIPERACILLIN SODIUM AND TAZOBACTAM SODIUM 3.38 G: 3; .375 INJECTION, SOLUTION INTRAVENOUS at 01:43

## 2025-06-17 RX ADMIN — MICAFUNGIN SODIUM 100 MG: 100 INJECTION, POWDER, LYOPHILIZED, FOR SOLUTION INTRAVENOUS at 21:33

## 2025-06-17 RX ADMIN — PIPERACILLIN SODIUM AND TAZOBACTAM SODIUM 3.38 G: 3; .375 INJECTION, SOLUTION INTRAVENOUS at 15:54

## 2025-06-17 RX ADMIN — POTASSIUM CHLORIDE 40 MEQ: 1.5 POWDER, FOR SOLUTION ORAL at 10:50

## 2025-06-17 RX ADMIN — AMLODIPINE BESYLATE 5 MG: 5 TABLET ORAL at 21:02

## 2025-06-17 RX ADMIN — AMLODIPINE BESYLATE 5 MG: 5 TABLET ORAL at 10:53

## 2025-06-17 RX ADMIN — VANCOMYCIN HYDROCHLORIDE 1000 MG: 1 INJECTION, SOLUTION INTRAVENOUS at 22:54

## 2025-06-17 RX ADMIN — SIMVASTATIN 10 MG: 10 TABLET, FILM COATED ORAL at 10:53

## 2025-06-17 RX ADMIN — DEXTROSE, SODIUM CHLORIDE, AND POTASSIUM CHLORIDE 100 ML/HR: 5; .9; .15 INJECTION INTRAVENOUS at 10:01

## 2025-06-17 RX ADMIN — METOPROLOL TARTRATE 12.5 MG: 25 TABLET, FILM COATED ORAL at 10:53

## 2025-06-17 RX ADMIN — OXYBUTYNIN CHLORIDE 5 MG: 5 TABLET ORAL at 21:02

## 2025-06-17 RX ADMIN — OXYBUTYNIN CHLORIDE 5 MG: 5 TABLET ORAL at 10:53

## 2025-06-17 RX ADMIN — DEXTROSE, SODIUM CHLORIDE, AND POTASSIUM CHLORIDE 75 ML/HR: 5; .9; .15 INJECTION INTRAVENOUS at 23:12

## 2025-06-17 RX ADMIN — PIPERACILLIN SODIUM AND TAZOBACTAM SODIUM 3.38 G: 3; .375 INJECTION, SOLUTION INTRAVENOUS at 10:56

## 2025-06-17 ASSESSMENT — COGNITIVE AND FUNCTIONAL STATUS - GENERAL
TOILETING: A LITTLE
MOBILITY SCORE: 21
HELP NEEDED FOR BATHING: A LITTLE
MOVING TO AND FROM BED TO CHAIR: A LITTLE
CLIMB 3 TO 5 STEPS WITH RAILING: A LITTLE
DAILY ACTIVITIY SCORE: 22
WALKING IN HOSPITAL ROOM: A LITTLE

## 2025-06-17 ASSESSMENT — ACTIVITIES OF DAILY LIVING (ADL)
HEARING - RIGHT EAR: DIFFICULTY WITH NOISE
HEARING - LEFT EAR: DIFFICULTY WITH NOISE

## 2025-06-17 ASSESSMENT — ENCOUNTER SYMPTOMS
WHEEZING: 0
EYE PAIN: 0
ARTHRALGIAS: 0
TROUBLE SWALLOWING: 0
FEVER: 1
SORE THROAT: 0
MYALGIAS: 0
CONFUSION: 0
HEADACHES: 0
COUGH: 0
SHORTNESS OF BREATH: 0
WEAKNESS: 1
JOINT SWELLING: 0
APPETITE CHANGE: 1
NUMBNESS: 0
CHILLS: 0
HEMATURIA: 0
DIZZINESS: 0
DYSURIA: 0
UNEXPECTED WEIGHT CHANGE: 0

## 2025-06-17 ASSESSMENT — PAIN SCALES - GENERAL: PAINLEVEL_OUTOF10: 0 - NO PAIN

## 2025-06-17 NOTE — H&P
"                                             [facility] History & Physical       Patient Name: Beatriz Raymond   YOB: 1945    Subjective:    Beatriz Raymond is a 79 y.o. female who presents to the hospital with complaints of fever and lethargy with recent gastric surgery. Patient underwent laparoscopic hiatal hernia repair with mesh and nissen fundoplication with Dr. Barfield on 5/21/25. Course complicated by a leak requiring ex lap and revision. Course was further complicated by BRUCE and a persistent leukocytosis. She was on broad-spectrum antibiotics and ultimately underwent an IR-guided drainage of a pelvic fluid collection on 6/3/2025. Over the last several days she has had increasing fatigue. She has noted lower abd pain and dysuria. She has been more lethargic. She denies SOB. Denies CP. PO intake has been diminished.     ED work up included a CT c/a/p significant mostly for pleural effusions.     A 10 point ROS was completed and is negative expect as stated in HPI.     Past Medical History:  HTN  HLD  GERD  OA  RA  Sarcoidosis   Sarcoma of the R thigh    Past Surgical History:  Hernia Repiar and complications as above  Cholecystectomy  Hysterectomy  EGD  Arthoplasty   L knee mensicus repair    Social History: Tobacco - Never, Alcohol - none, Recreational Drugs - none    Family History: family history includes Arthritis in her mother; Breast cancer in her paternal grandmother; Cancer in her father; Hearing loss in her brother and father; Hyperlipidemia in her father; Hypertension in her mother; Miscarriages / Stillbirths in her mother.     Objective:    /74   Pulse 98   Temp 36.6 °C (97.9 °F)   Resp 18   Ht 1.626 m (5' 4\")   Wt 73.9 kg (163 lb)   SpO2 (!) 91%   BMI 27.98 kg/m²     Physical Exam:    GENERAL: well developed, non-toxic, NAD, alert & cooperative  HEENT: normocephalic, atraumatic, sclera clear  CARDIAC: regular rate & rhythm, S1/S2  PULMONARY: CTA b/l, no respiratory " distress, - wheezes  ABDOMEN: soft, non-distended, diffusely tender to palpation  MSK: no peripheral edema, no obvious deformity  NEURO: A&O X 3, non-focal, CN II-XII grossly intact  SKIN: Warm and dry, no lesions, no rashes.  PSYCH: appropriate mood & affect     Assessment/Plan:    Post Operative Fever  Pleural Effusion   Splenic Infarct   HTN  HLD  GERD  OA  RA  Sarcoidosis   Sarcoma of the R thigh  Adrenal Adenoma      Cont coverage with broad spectrum abx and antifungal for now. Follow up blood cultures. Awaiting UA and Ucx. Bariatric fellow did evaluate patient. No leak or fluid collection noted on CT scan. Add on ESR/CRP. Check Procal.   Not sure how clinically significant the effusions are. Will consult Pulm. Perhaps a thoracentesis for diagnostic purposes could be useful if no other source of fever is found.   Diet and need for enteric nutrition deferred to bariatrics. Will start on Soft diet and protein supplements as recommended   Hold her Plaquenil, Leflunomide, Sulfasalazine for now. Cont Norvasc, Pepcid, PPI, Metoprolol   Will need outpatient follow up for adrenal adenoma incidentally noted      DVT Prophylaxis: Lovenox  Code Status: FULL CODE  Disposition: tbd     Alverto Hidalgo, DO  [facility]

## 2025-06-17 NOTE — PROGRESS NOTES
Vancomycin Dosing by Pharmacy- FOLLOW UP    Beatriz Raymond is a 79 y.o. year old female who Pharmacy has been consulted for vancomycin dosing for abdominal infection. Based on the patient's indication and renal status this patient is being dosed based on a goal AUC of 400-600.     Renal function is currently stable.    Current vancomycin dose: 1000 mg given every 24 hours    Estimated vancomycin AUC on current dose: 478 mg/L.hr     Visit Vitals  /71   Pulse 83   Temp 37 °C (98.6 °F) (Temporal)   Resp 15        Lab Results   Component Value Date    CREATININE 1.10 (H) 2025    CREATININE 1.21 (H) 2025    CREATININE 1.15 (H) 2025    CREATININE 1.36 (H) 2025    CREATININE 1.38 (H) 2025    CREATININE 1.22 (H) 2025        Patient weight is as follows:   Vitals:    25 1224   Weight: 73.9 kg (163 lb)       Cultures:  No results found for the encounter in last 14 days.       I/O last 3 completed shifts:  In: 590 (8 mL/kg) [IV Piggyback:590]  Out: - (0 mL/kg)   Weight: 73.9 kg   I/O during current shift:  I/O this shift:  In: 760 [IV Piggyback:760]  Out: -     Temp (24hrs), Av.7 °C (98.1 °F), Min:36.3 °C (97.3 °F), Max:37 °C (98.6 °F)      Assessment/Plan    Within goal AUC range. Continue current vancomycin regimen.    This dosing regimen is predicted by [employer] to result in the following pharmacokinetic parameters:  Regimen: 1000 mg IV every 24 hours.  Exposure target: AUC24 (range) 400-600 mg/L.hr   EQC95-27: 486 mg/L.hr  AUC24,ss: 478 mg/L.hr  Probability of AUC24 > 400: 81 %  Ctrough,ss: 14.3 mg/L  Probability of Ctrough,ss > 20: 12 %    The next level will be obtained on  at AM labs. May be obtained sooner if clinically indicated.   Will continue to monitor renal function daily while on vancomycin and order serum creatinine at least every 48 hours if not already ordered.  Follow for continued vancomycin needs, clinical response, and signs/symptoms of toxicity.        Tiffanie Tavares, PharmD

## 2025-06-17 NOTE — PROGRESS NOTES
Vancomycin Dosing by Pharmacy- INITIAL    Beatriz Raymond is a 79 y.o. year old female who Pharmacy has been consulted for vancomycin dosing for other abdominal infection. Based on the patient's indication and renal status this patient will be dosed based on a goal AUC of 400-600.     Renal function is currently stable.    Visit Vitals  /77   Pulse 105   Temp 36.9 °C (98.4 °F)   Resp 15        Lab Results   Component Value Date    CREATININE 1.21 (H) 2025    CREATININE 1.15 (H) 2025    CREATININE 1.36 (H) 2025    CREATININE 1.38 (H) 2025    CREATININE 1.36 (H) 2025    CREATININE 1.22 (H) 2025        Patient weight is as follows:   Vitals:    25 1224   Weight: 73.9 kg (163 lb)       Cultures:  No results found for the encounter in last 14 days.        No intake/output data recorded.  I/O during current shift:  No intake/output data recorded.    Temp (24hrs), Av.6 °C (97.9 °F), Min:36.3 °C (97.3 °F), Max:36.9 °C (98.4 °F)         Assessment/Plan     Patient has already been given a loading dose of 2000 mg.  Will initiate vancomycin maintenance, 1000 mg every 24 hours.    This dosing regimen is predicted by [employer] to result in the following pharmacokinetic parameters:  Regimen: 1000 mg IV every 24 hours.  Start time: 21:46 on 2025  Exposure target: AUC24 (range) 400-600 mg/L.hr   [id number]: 483 mg/L.hr  AUC24,ss: 490 mg/L.hr  Probability of AUC24 > 400: 72 %  Ctrough,ss: 15.4 mg/L  Probability of Ctrough,ss > 20: 27 %    Follow-up level will be ordered on  at mid am unless clinically indicated sooner.  Will continue to monitor renal function daily while on vancomycin and order serum creatinine at least every 48 hours if not already ordered.  Follow for continued vancomycin needs, clinical response, and signs/symptoms of toxicity.       Rama Christianson, PharmD

## 2025-06-17 NOTE — PROGRESS NOTES
GENERAL SURGERY CLINIC  FOLLOW UP NOTE      Name: Beatriz Raymond  MRN: [id number]      Index Surgery  Surgeon: Dr. Barfiedl at [facility]       Date of Surgery: 5/21/25  Surgical Procedure: LAPAROSCOPIC ASSISTED HIATAL HERNIA REPAIR WITH FUNDOPLICATION / EGD / TAP BLOCK  80960 - NE LAPS RPR PARAESPHGL HRNA INCL FUNDPLSTY W/MESH    Date of Surgery: 5/23/25  Surgical Procedure: Other: EXPLORATORY LAPAROSCOPY, SEGMENTAL RESECTION OF STOMACH; EGD; TAP BLOCK  51140- NE LAPS ABD PRTM&OMENTUM DX W/WO SPEC BR/WA SPX       HPI: 79 year old female presenting today for a 1 week follow up.  Patient is s/p elective laparoscopic hiatal hernia repair with mesh and Nissen fundoplication on 5/21/2025. On post op day 3 she was taken back to the OR an underwent a diagnostic laparoscopy, takedown of her fundoplication which revealed a leak in the proximal part of her plication, partial gastrectomy, and redo fundoplication on 5/23/2025. Patient has been having a difficult time with PO intake.  Was seen in the infusion center on 6/11/25 for LR infusion.  At last visit patient was counseled to increase proteins to at least 90gm per day and use visual cues such as setting out cups to drink in 1 hour, or setting timers to remind her to drink fluids to maintain hydration.  Patient readmitted to [facility] 6/16/25 with fever of unspecified cause.      HPI 6/12/25: 79-year old female presenting today for a follow up 3wks s/p elective laparoscopic hiatal hernia repair with mesh and Nissen fundoplication on 5/21/2025. On post op day 3 she was taken back to the OR an underwent a diagnostic laparoscopy, takedown of her fundoplication which revealed a leak in the proximal part of her plication, partial gastrectomy, and redo fundoplication on 5/23/2025.     Hospital Course 5/21-6/6:   79-year-old female who underwent an elective laparoscopic hiatal hernia repair with mesh and Nissen fundoplication on 5/21/2025.  On postop day 1 in the postop day 2, she  developed tachycardia and altered mentation, and diagnostic workup revealed intra-abdominal free fluid and concern for perforated viscus.  She underwent a diagnostic laparoscopy, takedown of her fundoplication which revealed a leak in the proximal part of her plication, partial gastrectomy, and redo fundoplication on 5/23/2025.  Postoperative course was complicated by an acute kidney injury and prolonged hospitalization given leukocytosis and multiple bouts of diarrhea.  She tested negative for C. difficile multiple times.  She did have persistent leukocytosis, peaking at roughly 22,000.  She was on broad-spectrum antibiotics and ultimately underwent an IR-guided drainage of the pelvic fluid collection on 6/3/2025.  Since then, her leukocytosis has down trended and her antibiotics were tailored as per factious disease recommendations.  On day of discharge, her leukocytosis was 12,900.  Her hemoglobin was noted to be 6.8 today, and she has been anemic for several days with a hemoglobin around 7.  She has no clinical signs of bleeding, no tachycardia, no hypotension.  Discussed at length with the patient and her family at bedside about the risk and benefits of transfusion versus observation.  At this time, we will elect to forego any transfusion of blood products.  The patient will be discharged home today, and she will get a CBC and a CMP before following up in clinic next week.    Hospital Course 6/16-***  ***    Concerns related to:  Nausea/Vomiting, Reflux: Some nausea  Abdominal Pain: Mild abdominal pain  Diarrhea/Constipation frequent bowel movements    REVIEW OF SYSTEMS:  Negative for except some nausea, abdominal pain and frequent bowel movements, lack of appetite  PHYSICAL EXAM:  There were no vitals taken for this visit.  Awake and alert  Anicteric sclera, no ptosis  Unlabored respirations.  Easily conversant without increased respiratory effort  Oriented to person, place, time.  Judgement  intact.  Appropriate mood, affect.   Abdomen soft, nondistended, minimal generalized tenderness, no rigidity or guarding  Incisions are healing well, Right flank without any erythema  Bilateral lower extremity edema  ASSESSMENT & PLAN 6/12/25:  79 y.o. female presenting for follow up visit s/p hiatal hernia repair with fundoplication complicated by perforation of wrap followed by abdominal washout and drainage with intra-abdominal fluid collections requiring antibiotics was discharged home in stable condition.    Continues to have poor p.o. intake due to mild nausea and mostly lack of appetite and fullness.    Repeat labs are fairly satisfactory,  Results for orders placed or performed during the hospital encounter of 06/16/25 (from the past 24 hours)   Urinalysis with Reflex Culture and Microscopic   Result Value Ref Range    Color, Urine Colorless (N) Light-Yellow, Yellow, Dark-Yellow    Appearance, Urine Clear Clear    Specific Gravity, Urine 1.016 1.005 - 1.035    pH, Urine 5.5 5.0, 5.5, 6.0, 6.5, 7.0, 7.5, 8.0    Protein, Urine 10 (TRACE) NEGATIVE, 10 (TRACE), 20 (TRACE) mg/dL    Glucose, Urine Normal Normal mg/dL    Blood, Urine NEGATIVE NEGATIVE mg/dL    Ketones, Urine NEGATIVE NEGATIVE mg/dL    Bilirubin, Urine NEGATIVE NEGATIVE mg/dL    Urobilinogen, Urine Normal Normal mg/dL    Nitrite, Urine NEGATIVE NEGATIVE    Leukocyte Esterase, Urine NEGATIVE NEGATIVE   Extra Urine Gray Tube   Result Value Ref Range    Extra Tube 293    Urinalysis Microscopic   Result Value Ref Range    WBC, Urine 1-5 1-5, NONE /HPF    RBC, Urine 1-2 NONE, 1-2, 3-5 /HPF    Bacteria, Urine 3+ (A) NONE SEEN /HPF    Mucus, Urine FEW Reference range not established. /LPF    Calcium Oxalate Crystals, Urine 1+ NONE, 1+ /HPF    Amorphous Crystals, Urine 1+ NONE, 1+, 2+ /HPF   Procalcitonin   Result Value Ref Range    Procalcitonin 0.25 (H) <=0.07 ng/mL   CBC   Result Value Ref Range    WBC 17.0 (H) 4.4 - 11.3 x10*3/uL    nRBC 0.0 0.0 - 0.0  /100 WBCs    RBC 2.83 (L) 4.00 - 5.20 x10*6/uL    Hemoglobin 7.8 (L) 12.0 - 16.0 g/dL    Hematocrit 25.6 (L) 36.0 - 46.0 %    MCV 91 80 - 100 fL    MCH 27.6 26.0 - 34.0 pg    MCHC 30.5 (L) 32.0 - 36.0 g/dL    RDW 15.9 (H) 11.5 - 14.5 %    Platelets 458 (H) 150 - 450 x10*3/uL   Comprehensive Metabolic Panel   Result Value Ref Range    Glucose 116 (H) 74 - 99 mg/dL    Sodium 138 136 - 145 mmol/L    Potassium 3.4 (L) 3.5 - 5.3 mmol/L    Chloride 101 98 - 107 mmol/L    Bicarbonate 26 21 - 32 mmol/L    Anion Gap 14 10 - 20 mmol/L    Urea Nitrogen 16 6 - 23 mg/dL    Creatinine 1.10 (H) 0.50 - 1.05 mg/dL    eGFR 52 (L) >60 mL/min/1.73m*2    Calcium 8.4 (L) 8.6 - 10.3 mg/dL    Albumin 2.8 (L) 3.4 - 5.0 g/dL    Alkaline Phosphatase 111 33 - 136 U/L    Total Protein 6.1 (L) 6.4 - 8.2 g/dL    AST 14 9 - 39 U/L    Bilirubin, Total 0.3 0.0 - 1.2 mg/dL    ALT 20 7 - 45 U/L   SST TOP   Result Value Ref Range    Extra Tube     Vancomycin   Result Value Ref Range    Vancomycin 17.6 5.0 - 20.0 ug/mL          Plan:    We discussed the options of increasing p.o. intake versus a gastrostomy tube placed versus Dobbhoff tube.  Is not critical however if she does not improve her p.o. intake she would require some sort of nutritional support.  After discussion she wants to wait for another week at least which I think is okay as long as she does not deteriorate further.    Encouraged her to complete her antibiotics course as advised by infectious disease.    Continue PPI and home medications.  Patient to follow-up next Friday virtually, sooner as needed.      Assessment and Plan:   79 year old female presenting today for a 1 week follow up.  Patient is s/p elective laparoscopic hiatal hernia repair with mesh and Nissen fundoplication on 5/21/2025. On post op day 3 she was taken back to the OR an underwent a diagnostic laparoscopy, takedown of her fundoplication which revealed a leak in the proximal part of her plication, partial gastrectomy,  and redo fundoplication on 5/23/2025. Patient has been having a difficult time with PO intake.  Was seen in the infusion center on 6/11/25 for LR infusion.  At last visit patient was counseled to increase proteins to at least 90gm per day and use visual cues such as setting out cups to drink in 1 hour, or setting timers to remind her to drink fluids to maintain hydration.  Patient readmitted to [facility] 6/16/25 with fever of unspecified cause.    ***

## 2025-06-17 NOTE — PROGRESS NOTES
"Beatriz Raymond \"Slava" is a 79 y.o. female on day 1 of admission presenting with Fever, unspecified fever cause.    Subjective   No acute events overnight.  The patient started on broad-spectrum antibiotics.  She feels similar as yesterday, still having trouble with solid foods.  Afebrile with stable hemodynamics.  Pain controlled.       Objective     Physical Exam  Constitutional: Well developed, awake/alert/oriented x3, no distress, alert and cooperative  Eyes: PERRL, EOMI, clear sclera  Head/Neck: Neck supple, no apparent injury, No JVD, trachea midline  Respiratory/Thorax: good chest expansion, thorax symmetric  Cardiovascular: Regular, rate and rhythm,  2+ equal pulses of the extremities  Gastrointestinal: Mild distension, soft, minimally tender, no rebound tenderness or guarding, no masses palpable, right abdominal incision with some scan purulent fluid expressed, otherwise swelling on the right hemiabdomen is stable. Other incisions are c/d/i  Musculoskeletal: ROM intact, no joint swelling, normal strength  Extremities: normal extremities, no cyanosis edema, contusions or wounds, no clubbing  Neurological: alert and oriented x3, intact senses,  Psychological: Appropriate mood and behavior  Skin: Warm and dry, no lesions, no rashes    Last Recorded Vitals  Blood pressure 157/71, pulse 83, temperature 37 °C (98.6 °F), temperature source Temporal, resp. rate 15, height 1.626 m (5' 4\"), weight 73.9 kg (163 lb), SpO2 96%.  Intake/Output last 3 Shifts:  I/O last 3 completed shifts:  In: 590 (8 mL/kg) [IV Piggyback:590]  Out: - (0 mL/kg)   Weight: 73.9 kg     Relevant Results                              Assessment & Plan  Fever, unspecified fever cause    Dysphasia    79-year-old female status post laparoscopic hiatal hernia repair with mesh and Nissen fundoplication on 5/21/2025, complicated by leak at the proximal portion of the fundoplication prompting return to the operating room on 5/23/2025 with a partial " gastrectomy and redo fundoplication.  She was discharged on 6/6/2025, and readmitted on 6/16/2025 in the setting of lethargy, fevers, and poor p.o. tolerance.  Plan is as follows….  - Multimodal pain control  - Broad-spectrum antibiotic coverage, would recommend consultation to infectious disease, currently on vancomycin, Zosyn, and micafungin  - Would recommend consult to pulmonology in regards to pleural effusions  - Consult to gastroenterology, patient would benefit from percutaneous endoscopic gastrostomy tube placement with possible dilation of the hiatus  - Out of bed and ambulating  - Mechanical and chemical DVT prophylaxis  - In the interim, the patient may have a soft diet.  She should have at least 3 protein shakes a day with each meal  - Continue maintenance IV fluids      I spent 60 minutes in the professional and overall care of this patient.      Eliud Hutton MD

## 2025-06-17 NOTE — PROGRESS NOTES
"Dixon Raymond \"Slava" is a 79 y.o. female who presents with Fever.    Patient endorses diffuse abdominal pain and poor diet. She states that she has about 5 BM this morning, with her recent abx will order stool panel.    Objective   Vitals:    06/17/25 1144   BP: 157/72   Pulse: 84   Resp:    Temp: 37 °C (98.6 °F)   SpO2: 94%      Physical Exam  Physical Exam:  Constitutional: frail, awake, alert, no acute distress  ENMT: mucous membranes moist, EOMI, conjunctivae clear  Head/Neck: normocephalic, atraumatic; supple, trachea midline  Respiratory/Thorax: patent airways, CTAB; no wheezes, rales, or rhonchi  Cardiovascular: RRR, no murmur  Gastrointestinal: soft, tender  Extremities: palpable peripheral pulses, no edema or cyanosis  Neurological: AO x3, no focal deficits  Psychological: appropriate mood and behavior  Skin: warm and dry    Assessment/Plan       Ginny Raymond is a 79 y.o. female with PMH of HTN, HLD, GERD, OA/RA, sarcoidosis recent laparoscopic hiatal hernia repair 5/21/2025 c/b peritonitis who presented to [facility] with lethargy, fevers and poor oral intake, admitted for post op fevers and leukocytosis.    Acute medical conditions:  #Laparoscopic hiatal hernia repair 5/21/2025 c/b peritonitis  #Abdominal pain  #Post op fevers  #Leukocytosis  #Bilat pleural effusions with atelectasis  Patient underwent elective laparoscopic hiatal hernia repair 5/21/2025 which was complicated by peritonitis and patient was initially discharged 6/6/2025  Patient endorses poor oral intake secondary to abdominal pain as since leaving the hospital, with a fever of 100.8F at home  - Leukocytosis with fevers and abdominal pain concerning for abdominal infection.  CT abdomen on admission shows nonspecific air-fluid level within small bowel suggestive of possible postop ileus or gastroenteritis, as well as bilateral pleural effusions with atelectasis  - Patient denies shortness of breath, on room air, pleural effusions and " atelectasis appear similar to prior CT on 6/2/2025 suggestive of shallow breathing in the setting of her abdominal discomfort, Pro-Curtis ordered for possible lung infection  -General Surgery who performed hiatal hernia repair are consulted and following, appreciate recommendations  - GI consulted per gen surg for possible PEG tube due to patient's poor p.o. intake  - Patient on modified diet, advance as tolerated  - Stool pathogen and C. difficile ordered due to patient's recent antibiotic use as well as recent diarrhea  - Patient started on broad antibiotics: Vancomycin, Zosyn, micafungin, ID consulted, appreciate recommendations  -PT/OT    Chronic medical conditions:  #Hypertension  #Hyperlipidemia  #GERD  #OA, RA  #Sarcoidosis  - Patient's home Plaquenil, leflunomide, and sulfasalazine currently held  - Continue other home medications: Simvastatin, Protonix, oxybutynin, metoprolol tartrate, amlodipine    DVT PPX: Lovenox  Diet: modified diet, soft bite size  IVF: 100cc/h D5 NS  Code Status: Full    This is a preliminary note written by the resident. Please wait for attending addendum for finalization of note and recommendations.    Venkat Dawson DO, PhD  Internal Medicine PGY2

## 2025-06-17 NOTE — CONSULTS
"Consults  Referred by  dr Alverto Penaloza MD: Venkat Gill MD    Reason For Consult  Fever and lethargy    History Of Present Illness  Beatriz Raymond \"Slava" is a 79 y.o. female   .  With past medical history of sarcoidosis, rheumatoid arthritis, sarcoma of the right thigh, who underwent laparoscopic hiatal hernia repair with mesh and Nissen fundoplication on 5/21/2025. On POD1 developed tachycardia and altered mentation, and diagnostic workup revealed intra-abdominal free fluid and concern for perforated viscus. She underwent a diagnostic laparoscopy, takedown of her fundoplication which revealed a leak in the proximal part of her plication, partial gastrectomy, and redo fundoplication on 5/23/2025. Postoperative course was complicated by an acute kidney injury and prolonged hospitalization given leukocytosis and multiple bouts of diarrhea. She tested negative for C. difficile multiple times. She did have persistent leukocytosis, peaking at roughly 22,000. She was on broad-spectrum antibiotics and ultimately underwent an IR-guided drainage of the pelvic fluid collection on 6/3/2025. Treated with about 12 days course of pip-tazo and fluconazole and her antibiotics were tailored to linezolid and levofloxacin, she was discharged with recommendation to complete another week course of antibiotics with tentative stop date on 6/12.   The patient reports that she could not tolerate oral pills and her PCP switched her to oral liquid antibiotic which I assume was Augmentin.   Cultures from abdominal fluid aspiration on 5/29 and again on 6/3 showed mixed gram-positive bacteria, abdominal fluid from 6/3 also revealed rare Candida albicans.  On 6/16 patient presented with fever and lethargy.  Lab work revealed again worsening leukocytosis up to 21,000.  Creatinine at 1.2, down to 1.1 which has improved compared to recent stay.  Patient denies having diarrhea at home but reports having 1 liquid stool this morning, " diarrhea did not recur since then.  Per patient's  it happened after contrast administration and she has history of contrast did use diarrhea in the past.  Had reported low-grade fever at 100.6 on Sunday and after taking Tylenol had slight fever about 100-day after.  She reports constant abdominal pain since her surgery but denies having any exacerbation of pain.  Denies cough, dyspnea sore throat.  Denies having dysuric symptoms and urinalysis has been negative.  Blood culture was collected, C. difficile stool PCR and stool enteric pathogen panel was collected.  Patient was initiated on pip-tazo, vancomycin, and micafungin which was not given yet.         Past Medical History  She has a past medical history of Anemia, Arthritis, Cataract, Chronic back pain, Colon polyp (2020), Fibroid, GERD (gastroesophageal reflux disease), Hearing aid worn, Hiatal hernia, HL (hearing loss), HLD (hyperlipidemia), Hypertension, PONV (postoperative nausea and vomiting) (1975), RA (rheumatoid arthritis), Sarcoidosis, Sarcoma (Multi), Sarcoma of right thigh (Multi), Thyroid nodule (2014), and Vision loss.    Surgical History  She has a past surgical history that includes Cholecystectomy (6/8/20); Hysterectomy (7/6/10); Esophagogastroduodenoscopy (4/8/16); Colonoscopy; Upper gastrointestinal endoscopy; Cataract extraction; Hip Arthroplasty; Knee arthroscopy w/ meniscal repair (Left); Leg Surgery (Right); Laparoscopy repair hiatal hernia (05/21/2025); and Exploratory laparotomy (05/23/2025).     Social History     Occupational History    Not on file   Tobacco Use    Smoking status: Never    Smokeless tobacco: Never   Vaping Use    Vaping status: Never Used   Substance and Sexual Activity    Alcohol use: Never    Drug use: Never    Sexual activity: Yes     Partners: Male     Birth control/protection: None     Travel History   Travel since 05/17/25    No documented travel since 05/17/25           Family History  Family  History[1]  Allergies  Codeine     Immunization History   Administered Date(s) Administered    COVID-19, mRNA, LNP-S, PF, 30 mcg/0.3 mL dose 02/05/2021, 02/26/2021, 09/03/2021    Pfizer COVID-19 vaccine, 12 years and older, (30mcg/0.3mL) (Comirnaty) 10/08/2024    Pfizer Gray Cap SARS-CoV-2 05/18/2022     Medications  Home medications:  Prescriptions Prior to Admission[2]  Current medications:  Scheduled medications  Scheduled Medications[3]  Continuous medications  Continuous Medications[4]  PRN medications  PRN Medications[5]    Review of Systems  12 points reviewed negative except in HPI  Objective  Range of Vitals (last 24 hours)  Heart Rate:  []   Temp:  [36.3 °C (97.3 °F)-37 °C (98.6 °F)]   Resp:  [15-18]   BP: (115-203)/(71-88)   SpO2:  [85 %-99 %]   Daily Weight  06/16/25 : 73.9 kg (163 lb)    Body mass index is 27.98 kg/m².     Physical Exam   GENERAL: non-toxic, NAD, alert & cooperative  HEENT: normocephalic, atraumatic, sclera clear  CARDIAC: regular rate & rhythm, S1/S2  PULMONARY: Clear to auscultation.  ABDOMEN: No bruising or soft tissue changes of the abdominal wall, tenderness predominantly in epigastric area and suprapubic area  MSK: no peripheral edema, no obvious deformity  NEURO: A&O X 3, non-focal, CN II-XII grossly intact  SKIN: Warm and dry, no lesions, no rashes.  PSYCH: appropriate mood & affec    Relevant Results  Outside Hospital Results  Yes -   Labs  Results from last 72 hours   Lab Units 06/17/25  0650 06/16/25  1342   WBC AUTO x10*3/uL 17.0* 21.0*   HEMOGLOBIN g/dL 7.8* 8.6*   HEMATOCRIT % 25.6* 27.9*   PLATELETS AUTO x10*3/uL 458* 432   NEUTROS PCT AUTO %  --  87.5   LYMPHS PCT AUTO %  --  4.7   MONOS PCT AUTO %  --  5.7   EOS PCT AUTO %  --  0.7     Results from last 72 hours   Lab Units 06/17/25  0650 06/16/25  1342   SODIUM mmol/L 138 139   POTASSIUM mmol/L 3.4* 3.5   CHLORIDE mmol/L 101 98   CO2 mmol/L 26 28   BUN mg/dL 16 23   CREATININE mg/dL 1.10* 1.21*   GLUCOSE mg/dL  "116* 93   CALCIUM mg/dL 8.4* 8.9   ANION GAP mmol/L 14 17   EGFR mL/min/1.73m*2 52* 46*     Results from last 72 hours   Lab Units 06/17/25  0650 06/16/25  1342   ALK PHOS U/L 111 120   BILIRUBIN TOTAL mg/dL 0.3 0.3   PROTEIN TOTAL g/dL 6.1* 6.8   ALT U/L 20 24   AST U/L 14 16   ALBUMIN g/dL 2.8* 3.2*     Estimated Creatinine Clearance: 41.5 mL/min (A) (by C-G formula based on SCr of 1.1 mg/dL (H)).  C-Reactive Protein   Date Value Ref Range Status   06/16/2025 22.24 (H) <1.00 mg/dL Final     CRP   Date Value Ref Range Status   07/29/2020 3.81 (A) mg/dL Final     Comment:     REF VALUE  < 1.00       Sedimentation Rate   Date Value Ref Range Status   06/16/2025 42 (H) 0 - 30 mm/h Final   07/29/2020 48 (H) 0 - 30 mm/h Final     No results found for: \"HIV1X2\", \"HIVCONF\", \"LLBHMM5BA\"  No results found for: \"HEPCABINIT\", \"HEPCAB\", \"HCVPCRQUANT\"  Microbiology  Susceptibility data from last 90 days.  Collected Specimen Info Organism   06/03/25 Fluid from Intra-abdominal Abscess Candida albicans   05/29/25 Fluid from Intra-abdominal Abscess Mixed Gram-Positive Bacteria      Microbiology:   6/16:  C. difficile PCR pending  Enteric stool pathogen  Blood culture 6/16-P    Imaging    CT of the chest/abdomen/pelvis on 6/16:   CHEST:  1.  Bilateral pleural effusions with adjacent atelectasis.  2.  Coronary artery calcifications present.  ABDOMEN/PELVIS:  1.  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  2.  Evidence of Nissen fundoplication, with no obstructive bowel gaspattern noted.  3.  Air-fluid levels within small bowel loops, which are nonspecific. This may be secondary to either a mild postoperative ileus, or underlying gastroenteritis.  4.  Sigmoid diverticulosis, with no evidence of diverticulitis.  5.  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 On POD1 developed tachycardia and altered mentation, and diagnostic workup revealed intra-abdominal free fluid and concern for perforated viscus. She underwent a diagnostic laparoscopy, takedown of her fundoplication which revealed a leak in the proximal part of her plication, partial gastrectomy, and redo fundoplication on 5/23/2025.  Cultures from abdominal fluid aspiration on 5/29 and again on 6/3 showed mixed gram-positive bacteria, abdominal fluid from 6/3 also revealed rare Candida albicans.  Treated with about 12 days course of pip-tazo and fluconazole and subsequently transitioned to oral linezolid and levofloxacin at home with stop date on 6/12, which she could not complete due to intolerance to pills but was prescribed oral liquid antibiotic instead, presumably Augmentin.  Review of systems is positive for low-grade fevers and confusion, no significant worsening of abdominal pain.  New onset of diarrhea in the hospital after contrast administration without recurrence so far.      Plan :  Follow-up in stool stool studies to rule out C. Difficile, previously negative.  Abdominal CT is not suggestive of any residual or new fluid collections although if stool studies and blood cultures are nondiagnostic patient may require either MRI or laparoscopic evaluation.  Agree with empiric pip-tazo, micafungin and vancomycin for now.  Will obtain expanded respiratory viral panel PCR.     I spent 55 minutes in the professional and overall care of this patient.          Quincy Núñez MD         [1]   Family History  Problem Relation Name Age of Onset    Arthritis Mother Ce Kuhn     Hypertension Mother Ce Kuhn     Miscarriages / Stillbirths Mother Ce Kuhn     Cancer Father Danilo Montenegrodave     Hearing loss Father Danilo Arden     Hyperlipidemia Father Danilo Arden     Hearing loss Brother Poncho Arden     Breast cancer Paternal Grandmother Renetta arden      Colon cancer Neg Hx      Celiac disease Neg Hx      Colon polyps Neg Hx      Irritable bowel syndrome Neg Hx      Liver disease Neg Hx      Blood clot Neg Hx     [2]   Medications Prior to Admission   Medication Sig Dispense Refill Last Dose/Taking    calcium carbonate/vitamin D3 (CALCIUM 600 + D,3, ORAL) Take 600 mg by mouth once daily.   2025    famotidine (Pepcid) 40 mg tablet Take 1 tablet (40 mg) by mouth once daily.   2025    flaxseed oiL 1,000 mg capsule Take 1 capsule (1,000 mg) by mouth once daily.   2025    hydroxychloroquine (Plaquenil) 200 mg tablet Take 1.5 tablets (300 mg) by mouth once daily.   2025    leflunomide (Arava) 20 mg tablet Take 1 tablet (20 mg) by mouth once daily.   2025    lidocaine (Lidoderm) 5 % patch Place 1 patch over 12 hours on the skin once daily. Apply to painful area 12 hours per day, remove for 12 hours. (Patient taking differently: Place 1 patch on the skin once daily. Abdominal Pain  Apply to painful area 12 hours per day, remove for 12 hours.) 15 patch 0 6/15/2025    [] linezolid (Zyvox) 100 mg/5 mL suspension Take 30 mL (600 mg) by mouth 2 times a day for 2 days. 120 mL 0 6/15/2025    metoprolol tartrate (Lopressor) 25 mg tablet Take 0.5 tablets (12.5 mg) by mouth 2 times a day. 30 tablet 1 2025    nystatin (Mycostatin) 100,000 unit/mL suspension Take 10 mL (1,000,000 Units) by mouth 4 times a day for 10 days. Swish and Swallow as prescribed 400 mL 0 6/15/2025 Evening    omeprazole (PriLOSEC) 40 mg DR capsule Take 1 capsule (40 mg) by mouth once daily in the morning. Take before meals.   2025    simvastatin (Zocor) 10 mg tablet Take 1 tablet (10 mg) by mouth once daily.   2025    solifenacin (VESIcare) 5 mg tablet Take 1 tablet (5 mg) by mouth once daily.   2025    sulfaSALAzine (Azulfidine) 500 mg tablet Take 1 tablet (500 mg) by mouth 4 times a day.   2025    acetaminophen (Tylenol 8 HOUR) 650 mg ER tablet Take  2 tablets (1,300 mg) by mouth 2 times a day.   2025    amLODIPine (Norvasc) 5 mg tablet Take 1 tablet (5 mg) by mouth 2 times a day.   2025    cholecalciferol (Vitamin D-3) 50,000 unit capsule Take 1 capsule (50,000 Units) by mouth 1 (one) time per week. (Patient not taking: Reported on 2025)   Not Taking    [] levoFLOXacin (Levaquin) 750 mg tablet Take 1 tablet (750 mg) by mouth every other day for 7 days. Do not start before 2025. 4 tablet 0     mirabegron (Myrbetriq) 25 mg tablet extended release 24 hr 24 hr tablet Take 2 tablets (50 mg) by mouth once daily. (Patient not taking: Reported on 2025)   Not Taking    omeprazole (PriLOSEC) 40 mg DR capsule Take 1 capsule (40 mg) by mouth once daily in the morning. Take before meals. Do not crush or chew. Open capsule, sprinkle beads on SF jello, pudding or applesauce. 30 capsule 0     ondansetron ODT (Zofran-ODT) 4 mg disintegrating tablet Dissolve 1 tablet (4 mg) in the mouth every 8 hours if needed for nausea or vomiting. (Patient not taking: Reported on 2025) 30 tablet 0 Not Taking    Ringer's solution,lactated (lactated Ringer's) infusion Infuse 500 mL/hr at 500 mL/hr into a venous catheter continuously. (Patient not taking: Reported on 2025) 1000 mL 0 Not Taking   [3] amLODIPine, 5 mg, oral, BID  enoxaparin, 40 mg, subcutaneous, q24h  famotidine, 40 mg, oral, Daily  [Held by provider] hydroxychloroquine, 300 mg, oral, Daily  [Held by provider] leflunomide, 20 mg, oral, Daily  metoprolol tartrate, 12.5 mg, oral, BID  micafungin, 100 mg, intravenous, q24h  oxyBUTYnin, 5 mg, oral, BID  pantoprazole, 40 mg, oral, Daily before breakfast  piperacillin-tazobactam, 3.375 g, intravenous, q6h  simvastatin, 10 mg, oral, Daily  [Held by provider] sulfaSALAzine, 500 mg, oral, 4x daily  vancomycin, 1,000 mg, intravenous, q24h    [4] potassium chloride-D5-0.9%NaCl, 75 mL/hr, Last Rate: 75 mL/hr (25 1553)    [5] PRN medications:  acetaminophen, ondansetron, vancomycin

## 2025-06-17 NOTE — CARE PLAN
The patient's goals for the shift include rest    The clinical goals for the shift include remain hemodynamically stable

## 2025-06-17 NOTE — CONSULTS
Department of Internal Medicine  Gastroenterology  Consult note      Reason for Consult: PEG tube    Chief Complaint: Fevers    History Obtained from: chart review and patient reports    History of Present Illness      The patient is a 79 y.o. female with signficant past medical history of hypertension, hyperlipidemia, GERD, OA, RA, sarcoidosis, sarcoma of the right thigh, and Nissen fundoplication complicated by leak requiring revision who presents for fevers.    She states ever since surgery she has been having issues with eating.  She states her main issue is that her mouth is so dry it is hard to swallow.  She denies having issues initiating the swallow or feeling like he gets stuck in the esophagus.  It is hard for her to articulate exactly what the issue is.  She states she feels like she is eating cardboard.  Prior to surgery she was having pill dysphagia.    She is having history of acid reflux for years and is currently on medication and her symptoms are mostly controlled.  She does endorse a lot of her symptoms are at night and wakes up with a bad taste in her mouth.  She does have abdominal pain from her surgery.  She denies any odynophagia, nausea, vomiting, constipation, or hematochezia.    She states in surgery she has been having a change in bowel habits.  She is been having intermittent diarrhea which has been going on for the past few days.  She notes she had 4 episodes of diarrhea this morning which she describes as loose to liquid and black color.  She does note she has been having black stools intermittently since surgery.  Prior to surgery she was moving her bowels daily normal caliber and color and send she has been alternating between daily formed bowel movements and diarrhea.  She also endorses having a lot of bloating.    Denies use of tobacco, alcohol, and recreational drugs. No blood thinner or recent changes in medication. Denies use of daily NSAIDs and Tylenol    She has had a  cholecystectomy and hysterectomy she recently underwent.  Laparoscopic hiatal hernia repair with mesh admission fundoplication on 5/21/2025 which was complicated by leak requiring ex lap with revision and partial gastrectomy.  She did undergo IR guided drainage of a pelvic fluid collection on 6/3/2025.  She was evaluated in the office by bariatric surgery who advised her if she did not keep up her nutrition and hydration she may require a PEG tube.    Allergies  Codeine    Current Medication  Current Medications[1]    Past Medical History  Active Ambulatory Problems     Diagnosis Date Noted    Anemia 01/09/2024    Arthralgia of hip 01/09/2024    Atrophic vaginitis 03/13/2014    Cobalamin deficiency 01/09/2024    Constipation 09/16/2016    Cramps of lower extremity 01/09/2024    Sleep related leg cramps 02/21/2025    Diverticula of intestine 03/26/2020    Fatigue 01/09/2024    Gastroesophageal reflux disease without esophagitis 03/13/2014    Hydronephrosis 01/09/2024    Hyperkalemia 08/18/2023    Hypertension 01/09/2024    Osteoarthritis 05/29/2020    Intestinal malabsorption 01/09/2024    Urge incontinence of urine 02/21/2025    Osteopenia 01/09/2024    Pure hypercholesterolemia 02/21/2025    Soft tissue sarcoma of thigh, right (Multi) 07/02/2020    Systolic murmur 01/09/2024    Thyroid nodule greater than or equal to 1.5 cm in diameter incidentally noted on imaging study 01/09/2024    Hiatal hernia 03/06/2025    Perforated viscus 04/30/2025    Benign neoplasm of right adrenal gland 08/31/2023    Fatty (change of) liver, not elsewhere classified 05/23/2025    Gastro-esophageal reflux disease with esophagitis 05/23/2025    Immunodeficiency, unspecified 05/23/2025    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    Malignant neoplasm of long bones of unspecified lower limb 05/23/2025    Non-toxic multinodular goiter 08/31/2023    IBS (irritable bowel syndrome) 05/23/2025    Snoring 01/09/2024     "Sepsis without acute organ dysfunction (Multi) 05/24/2025    Acute kidney injury 05/24/2025    Peritonitis 05/24/2025    Chronic bilateral low back pain 05/24/2025    Dehydration 06/10/2025    History of repair of hiatal hernia 06/13/2025     Resolved Ambulatory Problems     Diagnosis Date Noted    No Resolved Ambulatory Problems     Past Medical History:   Diagnosis Date    Arthritis     Cataract     Chronic back pain     Colon polyp 2020    Fibroid     GERD (gastroesophageal reflux disease)     Hearing aid worn     HL (hearing loss)     HLD (hyperlipidemia)     PONV (postoperative nausea and vomiting) 1975    RA (rheumatoid arthritis)     Sarcoidosis     Sarcoma (Multi)     Sarcoma of right thigh (Multi)     Thyroid nodule 2014    Vision loss        Past Surgical History  Surgical History[2]    Family History  Family History[3]  No family history of stomach or colon cancer    Social History  TOBACCO:  reports that she has never smoked. She has never used smokeless tobacco.  ETOH:  reports no history of alcohol use.  DRUGS:  reports no history of drug use.  MARITAL STATUS:   OCCUPATION:    Review of Systems  Review of Systems   Constitutional:  Positive for appetite change and fever. Negative for chills and unexpected weight change.   HENT:  Negative for mouth sores, sore throat and trouble swallowing.    Eyes:  Negative for pain and visual disturbance.   Respiratory:  Negative for cough, shortness of breath and wheezing.    Cardiovascular:  Negative for chest pain.   Genitourinary:  Negative for decreased urine volume, dysuria and hematuria.   Musculoskeletal:  Negative for arthralgias, joint swelling and myalgias.   Skin:  Negative for pallor and rash.   Neurological:  Positive for weakness. Negative for dizziness, numbness and headaches.   Psychiatric/Behavioral:  Negative for confusion.        PHYSICAL EXAM  VS: /71   Pulse 83   Temp 37 °C (98.6 °F) (Temporal)   Resp 15   Ht 1.626 m (5' 4\")   Wt 73.9 " kg (163 lb)   SpO2 96%   BMI 27.98 kg/m²  Body mass index is 27.98 kg/m².  Physical Exam  Constitutional:       General: She is not in acute distress.     Appearance: Normal appearance.   HENT:      Head: Normocephalic and atraumatic.      Mouth/Throat:      Mouth: Mucous membranes are moist.      Pharynx: Oropharynx is clear.   Eyes:      General: No scleral icterus.     Extraocular Movements: Extraocular movements intact.      Conjunctiva/sclera: Conjunctivae normal.      Pupils: Pupils are equal, round, and reactive to light.   Cardiovascular:      Rate and Rhythm: Normal rate and regular rhythm.      Heart sounds: No murmur heard.  Pulmonary:      Effort: Pulmonary effort is normal.      Breath sounds: No wheezing or rhonchi.   Abdominal:      General: Bowel sounds are normal. There is no distension.      Palpations: Abdomen is soft. There is no mass.      Tenderness: There is abdominal tenderness.      Hernia: No hernia is present.   Musculoskeletal:         General: No swelling or deformity.   Skin:     General: Skin is warm.      Coloration: Skin is not jaundiced.   Neurological:      General: No focal deficit present.      Mental Status: She is alert and oriented to person, place, and time.   Psychiatric:         Mood and Affect: Mood normal.        DATA  Recent blood work and relevant radiology studies were reviewed and discussed with the patient   Results from last 7 days   Lab Units 06/17/25  0650 06/16/25  1342 06/11/25  1616   WBC AUTO x10*3/uL 17.0*   < >  --    [facility] WBC AUTO Thousand/uL  --   --  9.0   RBC AUTO x10*6/uL 2.83*   < >  --    [facility] RBC AUTO Million/uL  --   --  2.70*   HEMOGLOBIN g/dL 7.8*   < >  --    [facility] HEMOGLOBIN g/dL  --   --  7.6*   HEMATOCRIT % 25.6*   < >  --    [facility] HEMATOCRIT %  --   --  24.5*   MCV fL 91   < >  --    [facility] MCV fL  --   --  90.7   MCHC g/dL 30.5*   < >  --    [facility] MCHC g/dL  --   --  31.0*   RDW % 15.9*   < >  --    [facility] RDW %  --   --  14.2   PLATELETS  AUTO x10*3/uL 458*   < >  --    [facility] PLATELETS AUTO Thousand/uL  --   --  579*   [facility] MPV fL  --   --  8.0    < > = values in this interval not displayed.       Results from last 72 hours   Lab Units 06/17/25  0650   SODIUM mmol/L 138   POTASSIUM mmol/L 3.4*   CHLORIDE mmol/L 101   CO2 mmol/L 26   BUN mg/dL 16   CREATININE mg/dL 1.10*   CALCIUM mg/dL 8.4*   PROTEIN TOTAL g/dL 6.1*   BILIRUBIN TOTAL mg/dL 0.3   ALK PHOS U/L 111   AST U/L 14   ALT U/L 20             Results from last 72 hours   Lab Units 06/16/25  1342   LIPASE U/L 29      Latest Reference Range & Units 05/30/25 13:14   Campylobacter Group Not Detected  Not Detected   Salmonella species Not Detected  Not Detected   Shigella species Not Detected  Not Detected   Vibrio Group Not Detected  Not Detected   Yersinia enterocolitica Not Detected  Not Detected   Shiga Toxin 1 Not Detected  Not Detected   Shiga Toxin 2 Not Detected  Not Detected   Norovirus GI/GII Not Detected  Not Detected   Rotavirus A Not Detected  Not Detected   C. difficile, PCR Not Detected  Not Detected     RADIOLOGY REVIEW  CT chest abdomen pelvis with contrast  sick 1625  IMPRESSION:  CHEST:  1.  Bilateral pleural effusions with adjacent atelectasis.  2.  Coronary artery calcifications present.  ABDOMEN/PELVIS:  1.  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  2.  Evidence of Nissen fundoplication, with no obstructive bowel gas  pattern noted.  3.  Air-fluid levels within small bowel loops, which are nonspecific.   This may be secondary to either a mild postoperative ileus, or  underlying gastroenteritis.  4.  Sigmoid diverticulosis, with no evidence of diverticulitis.  5.  Right adrenal mass, indicative of an adenoma based on unenhanced CT scan from May 2025.    ENDOSCOPIC REVIEW  NA    IMPRESSION/RECOMMENDATIONS  The patient is a 79 y.o. female with signficant past medical history of hypertension, hyperlipidemia, GERD, OA, RA, sarcoidosis, sarcoma of the right  thigh, and Nissen fundoplication complicated by leak requiring revision who presents for fevers.    Consult for PEG tube due to inability to keep it nutrition since surgery.  Diarrhea intermittent since surgery.  Does report sometimes black in color.  Hemoglobin appears to be at baseline with no elevation in BUN/CR.  C. difficile and stool pathogen negative 5/30/2025.  Could be antibiotic induced diarrhea  Pleural effusions pulmonary consulted  Postoperative fever ID consulted  Nissen fundoplication -complicated by leak requiring revision and partial gastrectomy.  Developed fluid abscess in pelvic region requiring drainage and small abscess at port site      PLAN  - Will place PEG 6/18/2025, patient is agreeable  - Currently on 40 mg Pepcid  -Currently on micafungin, vancomycin, and Zosyn, ID consulted  - Currently on soft bite-size diet with n.p.o. tonight  - Continue supportive care per primary team    Discussed with Dr. Christian & FELI Cunningham to follow    (Electronically signed bySusy Luis PA-C on 6/17/2025 at 11:05 AM)    Inpatient consult to Gastroenterology  Consult performed by: Susy Luis PA-C  Consult ordered by: Eliud Hutton MD             [1]   Current Facility-Administered Medications:     acetaminophen (Tylenol) tablet 975 mg, 975 mg, oral, q6h PRN, Alverto Hidalgo DO    amLODIPine (Norvasc) tablet 5 mg, 5 mg, oral, BID, Alverto Hidalgo DO, 5 mg at 06/17/25 1053    dextrose 5 % and sodium chloride 0.9 % with KCl 20 mEq/L infusion, 100 mL/hr, intravenous, Continuous, Alverto Hidalgo DO, Last Rate: 100 mL/hr at 06/17/25 1001, 100 mL/hr at 06/17/25 1001    enoxaparin (Lovenox) syringe 40 mg, 40 mg, subcutaneous, q24h, Alverto Hidalgo DO, 40 mg at 06/16/25 2315    famotidine (Pepcid) tablet 40 mg, 40 mg, oral, Daily, Alverto Hidalgo DO, 40 mg at 06/17/25 1055    [Held by provider] hydroxychloroquine (Plaquenil) tablet 300 mg, 300 mg, oral, Daily, Alverto Hidalgo DO    [Held by provider] leflunomide (Arava) tablet 20  mg, 20 mg, oral, Daily, Alverto Hidalgo DO    metoprolol tartrate (Lopressor) tablet 12.5 mg, 12.5 mg, oral, BID, Alverto Hidalgo DO, 12.5 mg at 06/17/25 1053    micafungin (Mycamine) 100 mg in dextrose 5%  mL, 100 mg, intravenous, q24h, Alverto Hidalgo DO    ondansetron (Zofran) injection 4 mg, 4 mg, intravenous, q6h PRN, Alverto Hidalgo DO    oxyBUTYnin (Ditropan) tablet 5 mg, 5 mg, oral, BID, Alverto Hidalgo DO, 5 mg at 06/17/25 1053    pantoprazole (ProtoNix) EC tablet 40 mg, 40 mg, oral, Daily before breakfast, Alverto Hidalgo DO    piperacillin-tazobactam (Zosyn) 3.375 g in dextrose (iso) IV 50 mL, 3.375 g, intravenous, q6h, Alverto Hidalgo DO, Last Rate: 0 mL/hr at 06/17/25 0215, 3.375 g at 06/17/25 1056    simvastatin (Zocor) tablet 10 mg, 10 mg, oral, Daily, Alverto Hidalgo DO, 10 mg at 06/17/25 1053    [Held by provider] sulfaSALAzine (Azulfidine) tablet 500 mg, 500 mg, oral, 4x daily, Alverto Hidalgo DO    vancomycin (Vancocin) 1,000 mg in dextrose 5%  mL, 1,000 mg, intravenous, q24h, Alverto Hidalgo DO    vancomycin (Vancocin) pharmacy to dose - pharmacy monitoring, , miscellaneous, Daily PRN, Alverto Hidalgo DO  [2]   Past Surgical History:  Procedure Laterality Date    CATARACT EXTRACTION      CHOLECYSTECTOMY  6/8/20    COLONOSCOPY      ESOPHAGOGASTRODUODENOSCOPY  4/8/16    EXPLORATORY LAPAROTOMY  05/23/2025    EXPLORATORY LAPAROSCOPY, SEGMENTAL RESECTION OF STOMACH; EGD; TAP BLOCK 57063- NV LAPS ABD PRTM&OMENTUM DX W/WO SPEC BR/WA SPX    HIP ARTHROPLASTY      HYSTERECTOMY  7/6/10    KNEE ARTHROSCOPY W/ MENISCAL REPAIR Left     LAPAROSCOPY REPAIR HIATAL HERNIA  05/21/2025    LAPAROSCOPIC ASSISTED HIATAL HERNIA REPAIR WITH FUNDOPLICATION / EGD / TAP BLOCK [id number] - NV LAPS RPR PARAESPHGL HRNA INCL FUNDPLSTY W/MESH    LEG SURGERY Right     sarcoma 2020 no bone involvement    UPPER GASTROINTESTINAL ENDOSCOPY     [3]   Family History  Problem Relation Name Age of Onset    Arthritis Mother Ce Kuhn     Hypertension Mother  Ce Her     Miscarriages / Stillbirths Mother Ce Her     Cancer Father Danilo Her     Hearing loss Father Danilo Her     Hyperlipidemia Father Danilo Her     Hearing loss Brother Poncho Her     Breast cancer Paternal Grandmother Renetta her     Colon cancer Neg Hx      Celiac disease Neg Hx      Colon polyps Neg Hx      Irritable bowel syndrome Neg Hx      Liver disease Neg Hx      Blood clot Neg Hx

## 2025-06-18 ENCOUNTER — APPOINTMENT (OUTPATIENT)
Dept: GASTROENTEROLOGY | Facility: HOSPITAL | Age: 80
DRG: 393 | End: 2025-06-18
Payer: MEDICARE

## 2025-06-18 ENCOUNTER — ANESTHESIA EVENT (OUTPATIENT)
Dept: GASTROENTEROLOGY | Facility: HOSPITAL | Age: 80
End: 2025-06-18
Payer: MEDICARE

## 2025-06-18 ENCOUNTER — ANESTHESIA (OUTPATIENT)
Dept: GASTROENTEROLOGY | Facility: HOSPITAL | Age: 80
End: 2025-06-18
Payer: MEDICARE

## 2025-06-18 LAB
ANION GAP SERPL CALC-SCNC: 12 MMOL/L (ref 10–20)
BUN SERPL-MCNC: 13 MG/DL (ref 6–23)
C COLI+JEJ+UPSA DNA STL QL NAA+PROBE: NOT DETECTED
C DIF TOX TCDA+TCDB STL QL NAA+PROBE: NOT DETECTED
CALCIUM SERPL-MCNC: 8.3 MG/DL (ref 8.6–10.3)
CHLORIDE SERPL-SCNC: 105 MMOL/L (ref 98–107)
CO2 SERPL-SCNC: 27 MMOL/L (ref 21–32)
CREAT SERPL-MCNC: 1.07 MG/DL (ref 0.5–1.05)
EC STX1 GENE STL QL NAA+PROBE: NOT DETECTED
EC STX2 GENE STL QL NAA+PROBE: NOT DETECTED
EGFRCR SERPLBLD CKD-EPI 2021: 53 ML/MIN/1.73M*2
ERYTHROCYTE [DISTWIDTH] IN BLOOD BY AUTOMATED COUNT: 15.8 % (ref 11.5–14.5)
FLUAV RNA RESP QL NAA+PROBE: NOT DETECTED
FLUBV RNA RESP QL NAA+PROBE: NOT DETECTED
GLUCOSE BLD MANUAL STRIP-MCNC: 126 MG/DL (ref 74–99)
GLUCOSE BLD MANUAL STRIP-MCNC: 128 MG/DL (ref 74–99)
GLUCOSE SERPL-MCNC: 129 MG/DL (ref 74–99)
HADV DNA SPEC QL NAA+PROBE: NOT DETECTED
HCT VFR BLD AUTO: 25.9 % (ref 36–46)
HGB BLD-MCNC: 7.8 G/DL (ref 12–16)
HMPV RNA SPEC QL NAA+PROBE: NOT DETECTED
HOLD SPECIMEN: NORMAL
HPIV1 RNA SPEC QL NAA+PROBE: NOT DETECTED
HPIV2 RNA SPEC QL NAA+PROBE: NOT DETECTED
HPIV3 RNA SPEC QL NAA+PROBE: NOT DETECTED
HPIV4 RNA SPEC QL NAA+PROBE: NOT DETECTED
MAGNESIUM SERPL-MCNC: 1.6 MG/DL (ref 1.6–2.4)
MCH RBC QN AUTO: 27.9 PG (ref 26–34)
MCHC RBC AUTO-ENTMCNC: 30.1 G/DL (ref 32–36)
MCV RBC AUTO: 93 FL (ref 80–100)
NOROVIRUS GI + GII RNA STL NAA+PROBE: NOT DETECTED
NRBC BLD-RTO: 0 /100 WBCS (ref 0–0)
PLATELET # BLD AUTO: 478 X10*3/UL (ref 150–450)
POTASSIUM SERPL-SCNC: 3.5 MMOL/L (ref 3.5–5.3)
RBC # BLD AUTO: 2.8 X10*6/UL (ref 4–5.2)
RHINOVIRUS RNA UPPER RESP QL NAA+PROBE: NOT DETECTED
RSV RNA RESP QL NAA+PROBE: NOT DETECTED
RV RNA STL NAA+PROBE: NOT DETECTED
SALMONELLA DNA STL QL NAA+PROBE: NOT DETECTED
SARS-COV-2 RNA RESP QL NAA+PROBE: NOT DETECTED
SHIGELLA DNA SPEC QL NAA+PROBE: NOT DETECTED
SODIUM SERPL-SCNC: 140 MMOL/L (ref 136–145)
V CHOLERAE DNA STL QL NAA+PROBE: NOT DETECTED
WBC # BLD AUTO: 13.5 X10*3/UL (ref 4.4–11.3)
Y ENTEROCOL DNA STL QL NAA+PROBE: NOT DETECTED

## 2025-06-18 PROCEDURE — 7100000002 HC RECOVERY ROOM TIME - EACH INCREMENTAL 1 MINUTE

## 2025-06-18 PROCEDURE — 36415 COLL VENOUS BLD VENIPUNCTURE: CPT

## 2025-06-18 PROCEDURE — 82565 ASSAY OF CREATININE: CPT

## 2025-06-18 PROCEDURE — 2500000004 HC RX 250 GENERAL PHARMACY W/ HCPCS (ALT 636 FOR OP/ED): Performed by: INTERNAL MEDICINE

## 2025-06-18 PROCEDURE — 3700000001 HC GENERAL ANESTHESIA TIME - INITIAL BASE CHARGE

## 2025-06-18 PROCEDURE — 2500000004 HC RX 250 GENERAL PHARMACY W/ HCPCS (ALT 636 FOR OP/ED)

## 2025-06-18 PROCEDURE — 2500000002 HC RX 250 W HCPCS SELF ADMINISTERED DRUGS (ALT 637 FOR MEDICARE OP, ALT 636 FOR OP/ED): Performed by: ANESTHESIOLOGY

## 2025-06-18 PROCEDURE — C1726 CATH, BAL DIL, NON-VASCULAR: HCPCS

## 2025-06-18 PROCEDURE — 83735 ASSAY OF MAGNESIUM: CPT

## 2025-06-18 PROCEDURE — 99024 POSTOP FOLLOW-UP VISIT: CPT | Performed by: SURGERY

## 2025-06-18 PROCEDURE — A43246 PR EDG PERCUTANEOUS PLACEMENT GASTROSTOMY TUBE: Performed by: ANESTHESIOLOGY

## 2025-06-18 PROCEDURE — 99100 ANES PT EXTEME AGE<1 YR&>70: CPT | Performed by: ANESTHESIOLOGY

## 2025-06-18 PROCEDURE — 43249 ESOPH EGD DILATION <30 MM: CPT | Performed by: STUDENT IN AN ORGANIZED HEALTH CARE EDUCATION/TRAINING PROGRAM

## 2025-06-18 PROCEDURE — 99233 SBSQ HOSP IP/OBS HIGH 50: CPT | Performed by: INTERNAL MEDICINE

## 2025-06-18 PROCEDURE — 2500000002 HC RX 250 W HCPCS SELF ADMINISTERED DRUGS (ALT 637 FOR MEDICARE OP, ALT 636 FOR OP/ED)

## 2025-06-18 PROCEDURE — 7100000001 HC RECOVERY ROOM TIME - INITIAL BASE CHARGE

## 2025-06-18 PROCEDURE — 1100000001 HC PRIVATE ROOM DAILY

## 2025-06-18 PROCEDURE — 2500000001 HC RX 250 WO HCPCS SELF ADMINISTERED DRUGS (ALT 637 FOR MEDICARE OP)

## 2025-06-18 PROCEDURE — 2720000007 HC OR 272 NO HCPCS

## 2025-06-18 PROCEDURE — 85027 COMPLETE CBC AUTOMATED: CPT

## 2025-06-18 PROCEDURE — 43246 EGD PLACE GASTROSTOMY TUBE: CPT | Performed by: STUDENT IN AN ORGANIZED HEALTH CARE EDUCATION/TRAINING PROGRAM

## 2025-06-18 PROCEDURE — 0DH63UZ INSERTION OF FEEDING DEVICE INTO STOMACH, PERCUTANEOUS APPROACH: ICD-10-PCS | Performed by: STUDENT IN AN ORGANIZED HEALTH CARE EDUCATION/TRAINING PROGRAM

## 2025-06-18 PROCEDURE — 2780000003 HC OR 278 NO HCPCS

## 2025-06-18 PROCEDURE — A43246 PR EDG PERCUTANEOUS PLACEMENT GASTROSTOMY TUBE

## 2025-06-18 PROCEDURE — 3700000002 HC GENERAL ANESTHESIA TIME - EACH INCREMENTAL 1 MINUTE

## 2025-06-18 PROCEDURE — 82947 ASSAY GLUCOSE BLOOD QUANT: CPT

## 2025-06-18 RX ORDER — ACETAMINOPHEN 325 MG/1
975 TABLET ORAL EVERY 6 HOURS PRN
Status: DISCONTINUED | OUTPATIENT
Start: 2025-06-18 | End: 2025-06-24 | Stop reason: HOSPADM

## 2025-06-18 RX ORDER — PROPOFOL 10 MG/ML
INJECTION, EMULSION INTRAVENOUS AS NEEDED
Status: DISCONTINUED | OUTPATIENT
Start: 2025-06-18 | End: 2025-06-18

## 2025-06-18 RX ORDER — HYDRALAZINE HYDROCHLORIDE 20 MG/ML
5 INJECTION INTRAMUSCULAR; INTRAVENOUS EVERY 4 HOURS PRN
Status: DISCONTINUED | OUTPATIENT
Start: 2025-06-18 | End: 2025-06-24 | Stop reason: HOSPADM

## 2025-06-18 RX ORDER — LOPERAMIDE HYDROCHLORIDE 2 MG/1
2 CAPSULE ORAL 4 TIMES DAILY PRN
Status: DISCONTINUED | OUTPATIENT
Start: 2025-06-18 | End: 2025-06-18

## 2025-06-18 RX ORDER — OXYBUTYNIN CHLORIDE 5 MG/1
5 TABLET ORAL 2 TIMES DAILY
Status: DISCONTINUED | OUTPATIENT
Start: 2025-06-18 | End: 2025-06-24 | Stop reason: HOSPADM

## 2025-06-18 RX ORDER — AMLODIPINE BESYLATE 5 MG/1
5 TABLET ORAL 2 TIMES DAILY
Status: DISCONTINUED | OUTPATIENT
Start: 2025-06-18 | End: 2025-06-24 | Stop reason: HOSPADM

## 2025-06-18 RX ORDER — METOPROLOL TARTRATE 25 MG/1
12.5 TABLET, FILM COATED ORAL 2 TIMES DAILY
Status: DISCONTINUED | OUTPATIENT
Start: 2025-06-18 | End: 2025-06-24 | Stop reason: HOSPADM

## 2025-06-18 RX ORDER — LOPERAMIDE HYDROCHLORIDE 2 MG/1
2 CAPSULE ORAL 4 TIMES DAILY PRN
Status: DISCONTINUED | OUTPATIENT
Start: 2025-06-18 | End: 2025-06-24 | Stop reason: HOSPADM

## 2025-06-18 RX ORDER — TRAZODONE HYDROCHLORIDE 50 MG/1
50 TABLET ORAL NIGHTLY
Status: DISCONTINUED | OUTPATIENT
Start: 2025-06-18 | End: 2025-06-18

## 2025-06-18 RX ORDER — IPRATROPIUM BROMIDE AND ALBUTEROL SULFATE 2.5; .5 MG/3ML; MG/3ML
3 SOLUTION RESPIRATORY (INHALATION) ONCE
Status: COMPLETED | OUTPATIENT
Start: 2025-06-18 | End: 2025-06-18

## 2025-06-18 RX ORDER — PANTOPRAZOLE SODIUM 40 MG/10ML
40 INJECTION, POWDER, LYOPHILIZED, FOR SOLUTION INTRAVENOUS DAILY
Status: DISCONTINUED | OUTPATIENT
Start: 2025-06-18 | End: 2025-06-24

## 2025-06-18 RX ORDER — AMLODIPINE BESYLATE 5 MG/1
5 TABLET ORAL ONCE
Status: COMPLETED | OUTPATIENT
Start: 2025-06-18 | End: 2025-06-18

## 2025-06-18 RX ORDER — MAGNESIUM SULFATE HEPTAHYDRATE 40 MG/ML
2 INJECTION, SOLUTION INTRAVENOUS ONCE
Status: COMPLETED | OUTPATIENT
Start: 2025-06-18 | End: 2025-06-18

## 2025-06-18 RX ORDER — SIMVASTATIN 10 MG/1
10 TABLET, FILM COATED ORAL DAILY
Status: DISCONTINUED | OUTPATIENT
Start: 2025-06-19 | End: 2025-06-24 | Stop reason: HOSPADM

## 2025-06-18 RX ORDER — FAMOTIDINE 20 MG/1
20 TABLET, FILM COATED ORAL DAILY
Status: DISCONTINUED | OUTPATIENT
Start: 2025-06-19 | End: 2025-06-18

## 2025-06-18 RX ORDER — FAMOTIDINE 20 MG/1
20 TABLET, FILM COATED ORAL DAILY
Status: DISCONTINUED | OUTPATIENT
Start: 2025-06-19 | End: 2025-06-24 | Stop reason: HOSPADM

## 2025-06-18 RX ADMIN — AMLODIPINE BESYLATE 5 MG: 5 TABLET ORAL at 16:23

## 2025-06-18 RX ADMIN — PROPOFOL 20 MG: 10 INJECTION, EMULSION INTRAVENOUS at 12:13

## 2025-06-18 RX ADMIN — PROPOFOL 100 MCG/KG/MIN: 10 INJECTION, EMULSION INTRAVENOUS at 12:11

## 2025-06-18 RX ADMIN — METOPROLOL TARTRATE 12.5 MG: 25 TABLET, FILM COATED ORAL at 21:17

## 2025-06-18 RX ADMIN — PIPERACILLIN SODIUM AND TAZOBACTAM SODIUM 3.38 G: 3; .375 INJECTION, SOLUTION INTRAVENOUS at 08:18

## 2025-06-18 RX ADMIN — MICAFUNGIN SODIUM 100 MG: 100 INJECTION, POWDER, LYOPHILIZED, FOR SOLUTION INTRAVENOUS at 22:06

## 2025-06-18 RX ADMIN — DEXTROSE, SODIUM CHLORIDE, AND POTASSIUM CHLORIDE 75 ML/HR: 5; .9; .15 INJECTION INTRAVENOUS at 14:17

## 2025-06-18 RX ADMIN — OXYBUTYNIN CHLORIDE 5 MG: 5 TABLET ORAL at 21:17

## 2025-06-18 RX ADMIN — PIPERACILLIN SODIUM AND TAZOBACTAM SODIUM 4.5 G: 4; .5 INJECTION, SOLUTION INTRAVENOUS at 21:22

## 2025-06-18 RX ADMIN — PANTOPRAZOLE SODIUM 40 MG: 40 INJECTION, POWDER, FOR SOLUTION INTRAVENOUS at 15:58

## 2025-06-18 RX ADMIN — AMLODIPINE BESYLATE 5 MG: 5 TABLET ORAL at 23:16

## 2025-06-18 RX ADMIN — PROPOFOL 50 MG: 10 INJECTION, EMULSION INTRAVENOUS at 12:10

## 2025-06-18 RX ADMIN — HYDRALAZINE HYDROCHLORIDE 5 MG: 20 INJECTION INTRAMUSCULAR; INTRAVENOUS at 16:19

## 2025-06-18 RX ADMIN — IPRATROPIUM BROMIDE AND ALBUTEROL SULFATE 3 ML: .5; 3 SOLUTION RESPIRATORY (INHALATION) at 13:29

## 2025-06-18 RX ADMIN — PIPERACILLIN SODIUM AND TAZOBACTAM SODIUM 3.38 G: 3; .375 INJECTION, SOLUTION INTRAVENOUS at 02:53

## 2025-06-18 RX ADMIN — PIPERACILLIN SODIUM AND TAZOBACTAM SODIUM 4.5 G: 4; .5 INJECTION, SOLUTION INTRAVENOUS at 14:36

## 2025-06-18 RX ADMIN — ENOXAPARIN SODIUM 40 MG: 100 INJECTION SUBCUTANEOUS at 23:16

## 2025-06-18 RX ADMIN — MAGNESIUM SULFATE HEPTAHYDRATE 2 G: 40 INJECTION, SOLUTION INTRAVENOUS at 09:13

## 2025-06-18 SDOH — HEALTH STABILITY: MENTAL HEALTH: CURRENT SMOKER: 0

## 2025-06-18 ASSESSMENT — PAIN SCALES - GENERAL
PAINLEVEL_OUTOF10: 0 - NO PAIN
PAIN_LEVEL: 0
PAINLEVEL_OUTOF10: 0 - NO PAIN

## 2025-06-18 ASSESSMENT — COGNITIVE AND FUNCTIONAL STATUS - GENERAL
TOILETING: A LITTLE
MOVING TO AND FROM BED TO CHAIR: A LITTLE
MOBILITY SCORE: 21
HELP NEEDED FOR BATHING: A LITTLE
TOILETING: A LITTLE
CLIMB 3 TO 5 STEPS WITH RAILING: A LITTLE
WALKING IN HOSPITAL ROOM: A LITTLE
DAILY ACTIVITIY SCORE: 22
CLIMB 3 TO 5 STEPS WITH RAILING: A LITTLE
DAILY ACTIVITIY SCORE: 22
MOVING TO AND FROM BED TO CHAIR: A LITTLE
HELP NEEDED FOR BATHING: A LITTLE
WALKING IN HOSPITAL ROOM: A LITTLE
MOBILITY SCORE: 21

## 2025-06-18 ASSESSMENT — PAIN - FUNCTIONAL ASSESSMENT
PAIN_FUNCTIONAL_ASSESSMENT: 0-10

## 2025-06-18 NOTE — ANESTHESIA PREPROCEDURE EVALUATION
"Patient: Beatriz Raymond \"Ginny\"    Procedure Information       Date/Time: 06/18/25 1130    Scheduled providers: Jeff Quan MD; Israel Zapien MD    Procedure: EGD    Location: [facility]            Relevant Problems   Cardiac   (+) Hypertension   (+) Pure hypercholesterolemia   (+) Systolic murmur      GI   (+) Gastroesophageal reflux disease without esophagitis   (+) Hiatal hernia   (+) IBS (irritable bowel syndrome)      /Renal   (+) Hydronephrosis      Liver   (+) Fatty (change of) liver, not elsewhere classified      Endocrine   (+) Non-toxic multinodular goiter      Hematology   (+) Anemia   (+) Iron deficiency anemia secondary to blood loss (chronic)      Musculoskeletal   (+) Chronic bilateral low back pain   (+) Osteoarthritis       Clinical information reviewed:    Allergies  Meds     OB Status           NPO Detail:  NPO/Void Status  Date of Last Liquid: 06/18/25  Time of Last Liquid: 0800 (ice chips)  Date of Last Solid: 06/17/25         Physical Exam    Airway  Mallampati: II  TM distance: >3 FB  Neck ROM: full  Mouth opening: 3 or more finger widths     Cardiovascular   Rhythm: regular     Dental - normal exam     Pulmonary    Abdominal            Anesthesia Plan    History of general anesthesia?: yes  History of complications of general anesthesia?: no    ASA 3     MAC     The patient is not a current smoker.  Patient was not previously instructed to abstain from smoking on day of procedure.  Patient did not smoke on day of procedure.    intravenous induction   Anesthetic plan and risks discussed with patient.  Use of blood products discussed with patient who.    Plan discussed with CRNA.      "

## 2025-06-18 NOTE — PROGRESS NOTES
Vancomycin Dosing by Pharmacy- Cessation of Therapy    Consult to pharmacy for vancomycin dosing has been discontinued by the prescriber, pharmacy will sign off at this time.    Please call pharmacy if there are further questions or re-enter a consult if vancomycin is resumed.     Genoveva Cherry [facility]

## 2025-06-18 NOTE — PROGRESS NOTES
06/18/25 1040   Discharge Planning   Living Arrangements Spouse/significant other   Support Systems Spouse/significant other;Family members   Type of Residence Private residence   Number of Stairs to Enter Residence 3   Number of Stairs Within Residence 12   Expected Discharge Disposition Home H   Does the patient need discharge transport arranged? No     I met with patient and her  Danilo at the bedside, verified insurance and demographics.  Patient ambulates with a walker, denies falls and is independent with ADLs at baseline.  Currently patient is unable to tolerate adequate PO intake after having complicated hernia repair in May, repair this month.  She does not have PT/OT ordered but patient stated she does anticipate returning home with [facility].  She reported she plans on having feeding tube placed rather than PICC with TPN.  Care Coordination team following for assistance with discharge planning.  Blair DANGELO TCC  [id number]:  Patient had PEG placed and can have tube feeding initiated as early as this evening.

## 2025-06-18 NOTE — ANESTHESIA POSTPROCEDURE EVALUATION
"Patient: Beatriz Raymond \"Ginny\"    Procedure Summary       Date: 06/18/25 Room / Location: [facility]    Anesthesia Start: 1205 Anesthesia Stop: 1247    Procedure: EGD Diagnosis: Dysphasia    Scheduled Providers: Jeff Quan MD; Israel Zapien MD Responsible Provider: Israel Zapien MD    Anesthesia Type: MAC ASA Status: 3            Anesthesia Type: MAC    Vitals Value Taken Time   /73 06/18/25 12:47   Temp 36.1 06/18/25 12:47   Pulse 100 06/18/25 12:46   Resp 16 06/18/25 12:47   SpO2 95 % 06/18/25 12:46   Vitals shown include unfiled device data.    Anesthesia Post Evaluation    Patient location during evaluation: PACU  Patient participation: complete - patient participated  Level of consciousness: awake and alert  Pain score: 0  Pain management: satisfactory to patient  Airway patency: patent  Cardiovascular status: acceptable and hemodynamically stable  Respiratory status: acceptable, nonlabored ventilation, spontaneous ventilation and face mask  Hydration status: acceptable  Postoperative Nausea and Vomiting: none        There were no known notable events for this encounter.    "

## 2025-06-18 NOTE — PROGRESS NOTES
Medication Adjustment    The following medication(s) was/were adjusted for Beatriz Raymond per protocol/policy due to altered renal function.    Medication(s) adjusted:   Famotidine 40mg PO daily adjusted to famotidine 20mg PO daily based on hospital renal dosing protocol for CrCl 30-60mL/min (current CrCl is 42.7mL/min)    Lynne Fernandez, MiahD, BCPS   6/18/2025 10:07 AM

## 2025-06-18 NOTE — PROGRESS NOTES
"Subjective   Beatriz Raymond \"Slava" is a 79 y.o. female who presents with Fever.    Patient endorses diffuse abdominal pain and poor diet with dry mouth. She states that the dry mouth had worsened after the initial hiatal repair last May. GI will be taking her for an EGD today with plans for possible PEG tube placement. No fevers overnight and WBC down trending.    Objective   Vitals:    06/18/25 1134   BP: 180/83   Pulse: 97   Resp: 15   Temp: 36.7 °C (98.1 °F)   SpO2: 95%      Physical Exam  Physical Exam:  Constitutional: frail, awake, alert, no acute distress  ENMT: mucous membranes moist, EOMI, conjunctivae clear  Head/Neck: normocephalic, atraumatic; supple, trachea midline  Respiratory/Thorax: patent airways, CTAB; no wheezes, rales, or rhonchi  Cardiovascular: RRR, no murmur  Gastrointestinal: soft, tender  Extremities: palpable peripheral pulses, no edema or cyanosis  Neurological: AO x3, no focal deficits  Psychological: appropriate mood and behavior  Skin: warm and dry    Assessment/Plan       Ginny Raymond is a 79 y.o. female with PMH of HTN, HLD, GERD, OA/RA, sarcoidosis recent laparoscopic hiatal hernia repair 5/21/2025 c/b peritonitis who presented to [facility] with lethargy, fevers and poor oral intake, admitted fever with leukocytosis.    Acute medical conditions:  #Gastroenteritis  #Dysphagia  #Abdominal pain  #Diarrhea  #Hx lap hiatal hernia repair 5/21/2025 c/b peritonitis  #Bilat pleural effusions with atelectasis insetting of above  #Leukocytosis in setting of above  -Gen surg following, appreciate recommendations  -Poor oral intake and dysphagia, GI consulted for EGD with possible PEG tube placement today  -ID following prior culture from candida albicans with mixed GPB; continue vanc, zosyn, micafungin  -Respiratory panel, stool panel and c diff unremarkable  -Pulm consulted for bilat pleural effusions, procal 0.25, remains on RA  -PT/OT    Chronic medical " conditions:  #Hypertension  #Hyperlipidemia  #GERD  #OA, RA  #Sarcoidosis  -Held Plaquenil, leflunomide, and sulfasalazine in setting of infection  -Continue home medications: Simvastatin, Protonix, oxybutynin, metoprolol tartrate, amlodipine    DVT PPX: Lovenox  Diet: NPO for EGD  IVF: 75cc/h D5 NS   Code Status: FULL    This is a preliminary note written by the resident. Please wait for attending addendum for finalization of note and recommendations.    Venkat Dawson DO, PhD  Internal Medicine PGY2

## 2025-06-18 NOTE — PROGRESS NOTES
Medication Adjustment    The following medication(s) was/were adjusted for Beatriz Raymond per protocol/policy due to altered renal function.    Medication(s) adjusted:   Piperacillin/tazobactam 3.375 g q6h adjusted to 4.5 g q6h per Estimated Creatinine Clearance: 42.7 mL/min (A) (by C-G formula based on SCr of 1.07 mg/dL (H)).    Please contact Pharmacy with any questions.  Melinda Graham, PharmD, BCCCP

## 2025-06-18 NOTE — PROGRESS NOTES
"Beatriz Raymond \"Slava" is a 79 y.o. female on day 2 of admission presenting with Fever, unspecified fever cause.    Subjective   No acute events overnight.  Her WBC is down to 13.5 and she has been afebrile. Mild desaturations noted. She is hesitant to proceed with PEG placement.        Objective     Physical Exam  Constitutional: Well developed, awake/alert/oriented x3, no distress, alert and cooperative  Eyes: PERRL, EOMI, clear sclera  Head/Neck: Neck supple, no apparent injury, No JVD, trachea midline  Respiratory/Thorax: good chest expansion, thorax symmetric  Cardiovascular: Regular, rate and rhythm,  2+ equal pulses of the extremities  Gastrointestinal: Mild distension, soft, minimally tender, no rebound tenderness or guarding, no masses palpable, right abdominal incision with some scan purulent fluid expressed, otherwise swelling on the right hemiabdomen is stable. Other incisions are c/d/i  Musculoskeletal: ROM intact, no joint swelling, normal strength  Extremities: normal extremities, no cyanosis edema, contusions or wounds, no clubbing  Neurological: alert and oriented x3, intact senses,  Psychological: Appropriate mood and behavior  Skin: Warm and dry, no lesions, no rashes    Last Recorded Vitals  Blood pressure 159/72, pulse 86, temperature 36.4 °C (97.5 °F), resp. rate 15, height 1.626 m (5' 4\"), weight 73.9 kg (163 lb), SpO2 94%.  Intake/Output last 3 Shifts:  I/O last 3 completed shifts:  In: 3285.8 (44.4 mL/kg) [IV Piggyback:3285.8]  Out: - (0 mL/kg)   Weight: 73.9 kg     Relevant Results                              Assessment & Plan  Fever, unspecified fever cause    Dysphasia    79-year-old female status post laparoscopic hiatal hernia repair with mesh and Nissen fundoplication on 5/21/2025, complicated by leak at the proximal portion of the fundoplication prompting return to the operating room on 5/23/2025 with a partial gastrectomy and redo fundoplication.  She was discharged on 6/6/2025, and " readmitted on 6/16/2025 in the setting of lethargy, fevers, and poor p.o. tolerance.  Plan is as follows….  - Multimodal pain control  - Broad-spectrum antibiotic coverage, would recommend consultation to infectious disease, currently on vancomycin, Zosyn, and micafungin  - Would recommend consult to pulmonology in regards to pleural effusions  - Mechanical and chemical DVT prophylaxis  - In the interim, the patient may have a soft diet.  She should have at least 3 protein shakes a day with each meal  - Continue maintenance IV fluids  -- patient and family are hesitant to proceed with PEG, would recommend discussion about PICC placement and TPN      I spent 60 minutes in the professional and overall care of this patient.      Eliud Hutton MD

## 2025-06-19 LAB
ANION GAP SERPL CALC-SCNC: 14 MMOL/L (ref 10–20)
BUN SERPL-MCNC: 10 MG/DL (ref 6–23)
CALCIUM SERPL-MCNC: 8 MG/DL (ref 8.6–10.3)
CHLORIDE SERPL-SCNC: 106 MMOL/L (ref 98–107)
CO2 SERPL-SCNC: 26 MMOL/L (ref 21–32)
CREAT SERPL-MCNC: 1.04 MG/DL (ref 0.5–1.05)
EGFRCR SERPLBLD CKD-EPI 2021: 55 ML/MIN/1.73M*2
ERYTHROCYTE [DISTWIDTH] IN BLOOD BY AUTOMATED COUNT: 16 % (ref 11.5–14.5)
GLUCOSE SERPL-MCNC: 134 MG/DL (ref 74–99)
HCT VFR BLD AUTO: 26.1 % (ref 36–46)
HGB BLD-MCNC: 7.7 G/DL (ref 12–16)
MAGNESIUM SERPL-MCNC: 1.74 MG/DL (ref 1.6–2.4)
MCH RBC QN AUTO: 27.1 PG (ref 26–34)
MCHC RBC AUTO-ENTMCNC: 29.5 G/DL (ref 32–36)
MCV RBC AUTO: 92 FL (ref 80–100)
NRBC BLD-RTO: 0 /100 WBCS (ref 0–0)
PLATELET # BLD AUTO: 533 X10*3/UL (ref 150–450)
POTASSIUM SERPL-SCNC: 3.5 MMOL/L (ref 3.5–5.3)
RBC # BLD AUTO: 2.84 X10*6/UL (ref 4–5.2)
SODIUM SERPL-SCNC: 142 MMOL/L (ref 136–145)
WBC # BLD AUTO: 13.6 X10*3/UL (ref 4.4–11.3)

## 2025-06-19 PROCEDURE — 2500000004 HC RX 250 GENERAL PHARMACY W/ HCPCS (ALT 636 FOR OP/ED): Performed by: INTERNAL MEDICINE

## 2025-06-19 PROCEDURE — 80048 BASIC METABOLIC PNL TOTAL CA: CPT

## 2025-06-19 PROCEDURE — 83735 ASSAY OF MAGNESIUM: CPT

## 2025-06-19 PROCEDURE — 2500000002 HC RX 250 W HCPCS SELF ADMINISTERED DRUGS (ALT 637 FOR MEDICARE OP, ALT 636 FOR OP/ED)

## 2025-06-19 PROCEDURE — 1100000001 HC PRIVATE ROOM DAILY

## 2025-06-19 PROCEDURE — 36415 COLL VENOUS BLD VENIPUNCTURE: CPT

## 2025-06-19 PROCEDURE — 97161 PT EVAL LOW COMPLEX 20 MIN: CPT | Mod: GP

## 2025-06-19 PROCEDURE — 99233 SBSQ HOSP IP/OBS HIGH 50: CPT | Performed by: INTERNAL MEDICINE

## 2025-06-19 PROCEDURE — 94640 AIRWAY INHALATION TREATMENT: CPT

## 2025-06-19 PROCEDURE — 85027 COMPLETE CBC AUTOMATED: CPT

## 2025-06-19 PROCEDURE — 2500000001 HC RX 250 WO HCPCS SELF ADMINISTERED DRUGS (ALT 637 FOR MEDICARE OP)

## 2025-06-19 PROCEDURE — 2500000004 HC RX 250 GENERAL PHARMACY W/ HCPCS (ALT 636 FOR OP/ED)

## 2025-06-19 RX ORDER — ACETYLCYSTEINE 200 MG/ML
3 SOLUTION ORAL; RESPIRATORY (INHALATION)
Status: DISCONTINUED | OUTPATIENT
Start: 2025-06-19 | End: 2025-06-19

## 2025-06-19 RX ORDER — FUROSEMIDE 10 MG/ML
40 INJECTION INTRAMUSCULAR; INTRAVENOUS ONCE
Status: COMPLETED | OUTPATIENT
Start: 2025-06-19 | End: 2025-06-19

## 2025-06-19 RX ORDER — ACETYLCYSTEINE 200 MG/ML
3 SOLUTION ORAL; RESPIRATORY (INHALATION) 2 TIMES DAILY PRN
Status: DISCONTINUED | OUTPATIENT
Start: 2025-06-19 | End: 2025-06-24 | Stop reason: HOSPADM

## 2025-06-19 RX ADMIN — DEXTROSE, SODIUM CHLORIDE, AND POTASSIUM CHLORIDE 75 ML/HR: 5; .9; .15 INJECTION INTRAVENOUS at 04:11

## 2025-06-19 RX ADMIN — PIPERACILLIN SODIUM AND TAZOBACTAM SODIUM 4.5 G: 4; .5 INJECTION, SOLUTION INTRAVENOUS at 02:21

## 2025-06-19 RX ADMIN — AMLODIPINE BESYLATE 5 MG: 5 TABLET ORAL at 08:34

## 2025-06-19 RX ADMIN — ONDANSETRON 4 MG: 2 INJECTION INTRAMUSCULAR; INTRAVENOUS at 11:38

## 2025-06-19 RX ADMIN — METOPROLOL TARTRATE 12.5 MG: 25 TABLET, FILM COATED ORAL at 08:33

## 2025-06-19 RX ADMIN — OXYBUTYNIN CHLORIDE 5 MG: 5 TABLET ORAL at 08:33

## 2025-06-19 RX ADMIN — PIPERACILLIN SODIUM AND TAZOBACTAM SODIUM 4.5 G: 4; .5 INJECTION, SOLUTION INTRAVENOUS at 08:34

## 2025-06-19 RX ADMIN — SIMVASTATIN 10 MG: 10 TABLET, FILM COATED ORAL at 08:34

## 2025-06-19 RX ADMIN — METOPROLOL TARTRATE 12.5 MG: 25 TABLET, FILM COATED ORAL at 20:45

## 2025-06-19 RX ADMIN — LOPERAMIDE HYDROCHLORIDE 2 MG: 2 CAPSULE ORAL at 11:38

## 2025-06-19 RX ADMIN — FUROSEMIDE 40 MG: 10 INJECTION, SOLUTION INTRAMUSCULAR; INTRAVENOUS at 10:37

## 2025-06-19 RX ADMIN — OXYBUTYNIN CHLORIDE 5 MG: 5 TABLET ORAL at 20:45

## 2025-06-19 RX ADMIN — PANTOPRAZOLE SODIUM 40 MG: 40 INJECTION, POWDER, FOR SOLUTION INTRAVENOUS at 08:34

## 2025-06-19 RX ADMIN — AMLODIPINE BESYLATE 5 MG: 5 TABLET ORAL at 20:45

## 2025-06-19 RX ADMIN — ENOXAPARIN SODIUM 40 MG: 100 INJECTION SUBCUTANEOUS at 23:02

## 2025-06-19 RX ADMIN — FAMOTIDINE 20 MG: 20 TABLET, FILM COATED ORAL at 08:33

## 2025-06-19 SDOH — ECONOMIC STABILITY: HOUSING INSECURITY: IN THE PAST 12 MONTHS, HOW MANY TIMES HAVE YOU MOVED WHERE YOU WERE LIVING?: 0

## 2025-06-19 SDOH — ECONOMIC STABILITY: HOUSING INSECURITY: IN THE LAST 12 MONTHS, WAS THERE A TIME WHEN YOU WERE NOT ABLE TO PAY THE MORTGAGE OR RENT ON TIME?: NO

## 2025-06-19 SDOH — ECONOMIC STABILITY: FOOD INSECURITY: WITHIN THE PAST 12 MONTHS, THE FOOD YOU BOUGHT JUST DIDN'T LAST AND YOU DIDN'T HAVE MONEY TO GET MORE.: NEVER TRUE

## 2025-06-19 SDOH — ECONOMIC STABILITY: INCOME INSECURITY: IN THE PAST 12 MONTHS HAS THE ELECTRIC, GAS, OIL, OR WATER COMPANY THREATENED TO SHUT OFF SERVICES IN YOUR HOME?: NO

## 2025-06-19 SDOH — ECONOMIC STABILITY: FOOD INSECURITY: HOW HARD IS IT FOR YOU TO PAY FOR THE VERY BASICS LIKE FOOD, HOUSING, MEDICAL CARE, AND HEATING?: NOT HARD AT ALL

## 2025-06-19 SDOH — ECONOMIC STABILITY: FOOD INSECURITY: WITHIN THE PAST 12 MONTHS, YOU WORRIED THAT YOUR FOOD WOULD RUN OUT BEFORE YOU GOT THE MONEY TO BUY MORE.: NEVER TRUE

## 2025-06-19 SDOH — SOCIAL STABILITY: SOCIAL INSECURITY: WITHIN THE LAST YEAR, HAVE YOU BEEN HUMILIATED OR EMOTIONALLY ABUSED IN OTHER WAYS BY YOUR PARTNER OR EX-PARTNER?: NO

## 2025-06-19 SDOH — SOCIAL STABILITY: SOCIAL INSECURITY: WITHIN THE LAST YEAR, HAVE YOU BEEN AFRAID OF YOUR PARTNER OR EX-PARTNER?: NO

## 2025-06-19 SDOH — ECONOMIC STABILITY: TRANSPORTATION INSECURITY: IN THE PAST 12 MONTHS, HAS LACK OF TRANSPORTATION KEPT YOU FROM MEDICAL APPOINTMENTS OR FROM GETTING MEDICATIONS?: NO

## 2025-06-19 SDOH — ECONOMIC STABILITY: HOUSING INSECURITY: AT ANY TIME IN THE PAST 12 MONTHS, WERE YOU HOMELESS OR LIVING IN A SHELTER (INCLUDING NOW)?: NO

## 2025-06-19 ASSESSMENT — COGNITIVE AND FUNCTIONAL STATUS - GENERAL
CLIMB 3 TO 5 STEPS WITH RAILING: A LITTLE
MOVING TO AND FROM BED TO CHAIR: A LITTLE
CLIMB 3 TO 5 STEPS WITH RAILING: A LOT
MOBILITY SCORE: 21
HELP NEEDED FOR BATHING: A LITTLE
WALKING IN HOSPITAL ROOM: A LITTLE
MOVING TO AND FROM BED TO CHAIR: A LITTLE
MOVING FROM LYING ON BACK TO SITTING ON SIDE OF FLAT BED WITH BEDRAILS: A LITTLE
TOILETING: A LITTLE
STANDING UP FROM CHAIR USING ARMS: A LITTLE
MOBILITY SCORE: 17
TURNING FROM BACK TO SIDE WHILE IN FLAT BAD: A LITTLE
DAILY ACTIVITIY SCORE: 22
WALKING IN HOSPITAL ROOM: A LITTLE

## 2025-06-19 ASSESSMENT — ACTIVITIES OF DAILY LIVING (ADL)
HEARING - LEFT EAR: DIFFICULTY WITH NOISE
TOILETING: NEEDS ASSISTANCE
GROOMING: INDEPENDENT
LACK_OF_TRANSPORTATION: NO
PATIENT'S MEMORY ADEQUATE TO SAFELY COMPLETE DAILY ACTIVITIES?: YES
JUDGMENT_ADEQUATE_SAFELY_COMPLETE_DAILY_ACTIVITIES: YES
BATHING: NEEDS ASSISTANCE
HEARING - RIGHT EAR: DIFFICULTY WITH NOISE
FEEDING YOURSELF: INDEPENDENT
ADL_ASSISTANCE: INDEPENDENT
DRESSING YOURSELF: INDEPENDENT
LACK_OF_TRANSPORTATION: NO
ADEQUATE_TO_COMPLETE_ADL: YES
WALKS IN HOME: INDEPENDENT

## 2025-06-19 ASSESSMENT — PAIN SCALES - GENERAL
PAINLEVEL_OUTOF10: 4
PAINLEVEL_OUTOF10: 0 - NO PAIN
PAINLEVEL_OUTOF10: 0 - NO PAIN

## 2025-06-19 ASSESSMENT — PAIN - FUNCTIONAL ASSESSMENT: PAIN_FUNCTIONAL_ASSESSMENT: 0-10

## 2025-06-19 NOTE — CARE PLAN
The patient's goals for the shift include rest    The clinical goals for the shift include to have decreased diarrhea     Over the shift, the patient did not make progress toward the following goals. Barriers to progression include assist with adls.

## 2025-06-19 NOTE — PROGRESS NOTES
Message sent to RN asking for update on how pt is tolerating bolus tube feeds- per RN, pt c/o bloating, fullness, and diarrhea. Per GI note, pt was having these symptoms on admission, likely contributing to why she was not eating much and required the feeding tube. Will continue to monitor tolerance to TF.

## 2025-06-19 NOTE — CARE PLAN
The patient's goals for the shift include rest    The clinical goals for the shift include remain hemodynamically stable    Over the shift, the patient did not make progress toward the following goals. Barriers to progression include weakness. Recommendations to address these barriers include follow poc.

## 2025-06-19 NOTE — PROGRESS NOTES
"Subjective   Beatriz Raymond \"Slava" is a 79 y.o. female who presents with Fever.    Patient resting in bed. She underwent PEG placement yesterday. She states some abdominal fullness with the tube feeds.    Objective   Vitals:    06/19/25 1145   BP: 151/71   Pulse: 83   Resp:    Temp: 36.3 °C (97.3 °F)   SpO2: 96%      Physical Exam  Physical Exam:  Constitutional: frail, awake, alert, no acute distress  ENMT: mucous membranes dry, EOMI, conjunctivae clear  Head/Neck: normocephalic, atraumatic; supple, trachea midline  Respiratory/Thorax: rhonchi  Cardiovascular: RRR, no murmur  Gastrointestinal: diffuse tenderness, bowel sounds present  Extremities: palpable peripheral pulses, no edema or cyanosis  Neurological: AO x3, no focal deficits  Psychological: appropriate mood and behavior  Skin: warm and dry, PEG tube present    Assessment/Plan       Ginny Raymond is a 79 y.o. female with PMH of HTN, HLD, GERD, OA/RA, sarcoidosis recent laparoscopic hiatal hernia repair 5/21/2025 c/b peritonitis who presented to [facility] with lethargy, fevers and poor oral intake, admitted fever with leukocytosis.    Acute medical conditions:  #Gastroenteritis with diarrhea in setting of recent lap hiatal hernia repair 5/21/2025 c/b peritonitis  #Dysphagia with malnutrition in setting of above s/p PEG 6/18  #Bilat pleural effusions with atelectasis  #SIRS without sepsis  -Gen surg following, appreciate recommendations  -PEG placed by GI 6/18, startde on Tfs, discontinued mfluids  -ID following, continue broad abx vanc, zosyn, micafungin  -Diarrhea remains, stool panel and c diff unremarkable, imodium PRN  -Pulm consulted for bilat pleural effusions, appreciate recs for more aggressive bronchopulm hygiene  -PT/OT    Chronic medical conditions:  #Hypertension  #Hyperlipidemia  #GERD  #OA, RA  #Sarcoidosis  -Held Plaquenil, leflunomide, and sulfasalazine in setting of infection  -Continue home medications: Simvastatin, Protonix, oxybutynin, " metoprolol tartrate, amlodipine    DVT PPX: Lovenox  Diet: NPO with enteral feeds via PEG  IVF:  Code Status: FULL    This is a preliminary note written by the resident. Please wait for attending addendum for finalization of note and recommendations.    Venkat Dawson DO, PhD  Internal Medicine PGY2

## 2025-06-19 NOTE — PROGRESS NOTES
"Physical Therapy    Physical Therapy    Physical Therapy Evaluation    Patient Name: Beatriz Raymond \"Slava"  MRN: [id number]  Today's Date: 6/19/2025   Time Calculation  Start Time: 1522  Stop Time: 1553  Time Calculation (min): 31 min  619/619-A    Assessment/Plan   PT Assessment  PT Assessment Results: Decreased strength, Impaired balance, Decreased mobility, Decreased endurance, Pain, Decreased safety awareness  Rehab Prognosis: Good  Barriers to Discharge Home: No anticipated barriers (presumed /family assist as pta)  Evaluation/Treatment Tolerance: Patient limited by fatigue  Medical Staff Made Aware: Yes  Strengths: Ability to acquire knowledge, Attitude of self, Premorbid level of function, Support and attitude of living partners  Barriers to Participation: Comorbidities  End of Session Communication: Bedside nurse, PCT/NA/CTA  Assessment Comment: Pt. would benefit from continued physical therapy at low intensity level to address impaired strength, balance, endurance, and gait instability.  End of Session Patient Position: Alarm on, Up in chair, Held/seated with caregiver  IP OR SWING BED PT PLAN  Inpatient or Swing Bed: Inpatient  PT Plan  Treatment/Interventions: Bed mobility, Transfer training, Gait training, Balance training, Strengthening, Therapeutic exercise, Therapeutic activity, Stair training, Endurance training, Home exercise program  PT Plan: Ongoing PT  PT Frequency: 4 times per week  PT Discharge Recommendations: Low intensity level of continued care  Equipment Recommended upon Discharge: Wheeled walker  PT Recommended Transfer Status: Assist x1  PT - OK to Discharge: Yes (when medically cleared by physician)    Subjective     Problem List[1]    General Visit Information:  General  Reason for Referral: impaired functional mobility; PT eval and treat; activ.no restrictions  Referred By: Eunice  Past Medical History Relevant to Rehab: Complicated hernia repair, HTN, HLD, GERD, OA, RA, sarcoidosis, " sarcoma right thigh, cholecystectomy, hysterectomy, left knee meniscal repair, arthroplasty  Family/Caregiver Present: Yes  Caregiver Feedback:  present and inquistive of therapist's assessment and supportive of pt.  Prior to Session Communication: Bedside nurse (Nataly)  Patient Position Received:  (On BSC,  present)  General Comment: Pt. is a 78 y/o female adm. due to c/o fever and lethargy s/p recent gastric surgery: lap hiatal hernia with mesh and nissen fundoplication 5/21/25.  Sx was compilcation with leak> exploratory lap and revision.  Also noted BRUCE and leukocystosis.  6/3 IR guided drainage of pelvic fluid.  Pt.also c/o lower abd pain and poor oral intake.  6/18/25 EGD with PEG tube placement. CT A/P showed alba pleural effusions; diverticulosis; right adrenal mass.    Home Living:  Home Living  Type of Home: House  Lives With: Spouse  Home Adaptive Equipment: Walker rolling or standard, Cane  Home Layout: Two level, Laundry main level, Able to live on main level with bedroom/bathroom, Full bath main level  Home Access: Stairs to enter with rails  Entrance Stairs-Number of Steps: 3    Prior Level of Function:  Prior Function Per Pt/Caregiver Report  Level of [place]: Independent with ADLs and functional transfers, Needs assistance with homemaking  Receives Help From: Family ()  ADL Assistance: Independent  Homemaking Assistance: Needs assistance (recently  assisting more since sx/recovery.)  Ambulatory Assistance: Independent (only recently using WW since sx 5/21/25.  prior no ad.)    Precautions:  Precautions  Medical Precautions: Fall precautions, Oxygen therapy device and L/min (6LO2 per nc; peg tube)     Objective     Pain:  Pain Assessment  Pain Assessment: 0-10  0-10 (Numeric) Pain Score: 4  Pain Type: Surgical pain  Pain Location: Abdomen  Pain Interventions: Repositioned, Ambulation/increased activity  Response to Interventions:  Content/relaxed    Cognition:  Cognition  Overall Cognitive Status: Within Functional Limits  Orientation Level: Oriented X4    General Assessments:  General Observation  General Observation: Pt. is pleasant and cooperative for therapy.   Activity Tolerance  Endurance: Tolerates less than 10 min exercise, no significant change in vital signs  Sensation  Light Touch: No apparent deficits  Strength  Strength Comments: grossly alba ues 4-/5 t/o; grossly alba hips >=3/5, knees 4/5, ankles 4-/5  Perception  Inattention/Neglect: Appears intact  Coordination  Movements are Fluid and Coordinated: Yes     Static Sitting Balance  Static Sitting-Balance Support: Feet supported  Static Sitting-Level of Assistance: Close supervision  Static Standing Balance  Static Standing-Balance Support: Bilateral upper extremity supported  Static Standing-Level of Assistance: Close supervision  Static Standing-Comment/Number of Minutes: 3  Dynamic Standing Balance  Dynamic Standing-Balance Support: Bilateral upper extremity supported  Dynamic Standing-Level of Assistance: Contact guard  Dynamic Standing-Comments: F>F+    Functional Assessments:     Transfers  Transfer:  (sit<>stand from bsc and chair level req. CGA>SBA; slowed, mildly effortful)  Ambulation/Gait Training  Ambulation/Gait Training Performed: Yes (Pt. ambulated approx. 45ft with WW and CGA.  Gait is slowed, guarded, sl narrow kristal, shortened stride; but no overt lob noted.  Min fatigue while maintained on 6LO2 per nc.)     Extremity/Trunk Assessments:  RUE   RUE : Within Functional Limits  LUE   LUE: Within Functional Limits  RLE   RLE : Within Functional Limits  LLE   LLE : Within Functional Limits    Outcome Measures:     [facility] Basic Mobility  Turning from your back to your side while in a flat bed without using bedrails: A little  Moving from lying on your back to sitting on the side of a flat bed without using bedrails: A little  Moving to and from bed to chair (including a  wheelchair): A little  Standing up from a chair using your arms (e.g. wheelchair or bedside chair): A little  To walk in hospital room: A little  Climbing 3-5 steps with railing: A lot  Basic Mobility - Total Score: 17      Goals:  Encounter Problems       Encounter Problems (Active)       PT Problem       Patient will perform bed mobility supine<>sit independently (Progressing)       Start:  06/19/25    Expected End:  07/03/25            Patient will transfer sit<>stand independently (Progressing)       Start:  06/19/25    Expected End:  07/03/25            Patient will ambulate >=150 ft with WW independently (Progressing)       Start:  06/19/25    Expected End:  07/03/25            Patient will negotiate 3 steps with handrail and lrd prn,  with supervision (Progressing)       Start:  06/19/25    Expected End:  07/03/25            Pt.will perform BLE therapeutic exercises x 20reps x 2 sets for increased functional strength/endurance  (Progressing)       Start:  06/19/25    Expected End:  07/03/25                 Education Documentation  Mobility Training, taught by Anabela Gordon, PT at 6/19/2025  5:10 PM.  Learner: Significant Other, Patient  Readiness: Acceptance  Method: Explanation  Response: Verbalizes Understanding, Needs Reinforcement  Comment: safety for all fxl mobility; activity progression         [1]   Patient Active Problem List  Diagnosis    Anemia    Arthralgia of hip    Atrophic vaginitis    Cobalamin deficiency    Constipation    Cramps of lower extremity    Sleep related leg cramps    Diverticula of intestine    Fatigue    Gastroesophageal reflux disease without esophagitis    Hydronephrosis    Hyperkalemia    Hypertension    Osteoarthritis    Intestinal malabsorption    Urge incontinence of urine    Osteopenia    Pure hypercholesterolemia    Soft tissue sarcoma of thigh, right (Multi)    Systolic murmur    Thyroid nodule greater than or equal to 1.5 cm in diameter incidentally noted on imaging  study    Hiatal hernia    Perforated viscus    Benign neoplasm of right adrenal gland    Fatty (change of) liver, not elsewhere classified    Gastro-esophageal reflux disease with esophagitis    Immunodeficiency, unspecified    Iron deficiency anemia secondary to blood loss (chronic)    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

## 2025-06-19 NOTE — CONSULTS
"Pulmonary Critical Care note    Patient Name :Beatriz Raymond \"Slava"  Date of service : 06/19/25  MRN: [id number]  YOB: 1945      Interval History:    History of Present Illness:      79 y.o. female  on day  3 of admission presenting with Fever, unspecified fever cause.  S/p laparoscopic hiatal hernia repair, her course was implicated with BRUCE and persistent leukocytosis she was started on broad-spectrum IV antibiotics and recently had an IR drainage of pelvic fluid collection on 6/3/2025, patient was describing increased fatigue and recurrent nausea, her past medical history is extensive including hypertension/Anemia/GERD/rheumatoid arthritis/sarcoidosis     During her admission patient underwent CT scan of the chest which showed bilateral pleural effusion in addition to small bibasilar atelectatic changes, patient is currently on IV Zosyn .  Micafungin/vancomycin        Past Medical/Surgical History:    has a past medical history of Anemia, Arthritis, Cataract, Chronic back pain, Colon polyp (2020), Fibroid, GERD (gastroesophageal reflux disease), Hearing aid worn, Hiatal hernia, HL (hearing loss), HLD (hyperlipidemia), Hypertension, PONV (postoperative nausea and vomiting) (1975), RA (rheumatoid arthritis), Sarcoidosis, Sarcoma (Multi), Sarcoma of right thigh (Multi), Thyroid nodule (2014), and Vision loss.   has a past surgical history that includes Cholecystectomy (6/8/20); Hysterectomy (7/6/10); Esophagogastroduodenoscopy (4/8/16); Colonoscopy; Upper gastrointestinal endoscopy; Cataract extraction; Hip Arthroplasty; Knee arthroscopy w/ meniscal repair (Left); Leg Surgery (Right); Laparoscopy repair hiatal hernia (05/21/2025); and Exploratory laparotomy (05/23/2025).   reports that she has never smoked. She has never used smokeless tobacco. She reports that she does not drink alcohol and does not use drugs.  Family History:   No relevant family history has been documented for this " patient.    Allergies:     Codeine      Social history:   reports that she has never smoked. She has never used smokeless tobacco. She reports that she does not drink alcohol and does not use drugs.      Review of Systems:   General: denies weight gain, denies loss of appetite, fever, chills, night sweats.  HEENT: denies headaches, dizziness, head trauma, visual changes, eye pain, tinnitus, nosebleeds, hoarseness or throat pain    Respiratory: denies chest pain, dyspnea, cough and hemoptysis  Cardiovascular: denies orthopnea, paroxysmal nocturnal dyspnea, leg swelling, and previous heart attack.    Gastrointestinal: denies pain, nausea vomiting, diarrhea, constipation, melena or bleeding.  Genitourinary: denies hematuria, frequency, urgency or dysuria  Neurology: denies syncope, seizures, paralysis, paraesthesia   Endocrine: denies polyuria, polydipsia, skin or hair changes, and heat or cold intolerance  Musculoskeletal: denies joint pain, swelling, arthritis or myalgia  Hematologic: denies bleeding, adenopathy and easy bruising  Skin: denies rashes and skin discoloration  Psychiatry: denies depression    Physical Exam:   Vital Signs:   Vitals:    06/19/25 0430   BP: (!) 186/71   Pulse: 97   Resp:    Temp: 36.6 °C (97.9 °F)   SpO2: 92%     Vitals:    06/18/25 1134   Weight: 73.9 kg (163 lb)         Input/Output:    Intake/Output Summary (Last 24 hours) at 6/19/2025 1020  Last data filed at 6/19/2025 0938  Gross per 24 hour   Intake 3005 ml   Output --   Net 3005 ml       Oxygen requirements:    Ventilator Information:     Oxygen Therapy/Pulse Ox  Medical Gas Therapy: Supplemental oxygen  Medical Gas Delivery Method: Nasal cannula  SpO2: 92 %  Patient Activity During SpO2 Measurement: At rest  Temp: 36.6 °C (97.9 °F)        General appearance: Not in pain or distress, in no respiratory distress    HEENT: Atraumatic/normocephalic, EOMI, KINJAL, pharynx clear, moist mucosa, redness of the uvula appreciated,   Neck:  Supple, no jugular venous distension, lymphadenopathy, thyromegaly or carotid bruits  Chest: rhonchi  Cardiovascular: Normal S1, S2, regular rate and rhythm, no murmur, rub or gallop  Abdomen: Normal sounds present, soft, lax with no tenderness, no hepatosplenomegaly, and no masses  Extremities: No edema. Pulses are equally present.   Skin: intact, no rashes   Neurologic: Alert and oriented x 3, No focal deficit         Current Medications:   Scheduled medications  Scheduled Medications[1]  Continuous medications  Continuous Medications[2]  PRN medications  PRN Medications[3]      Investigations:  Labs, radiological imaging and cardiac work up were personally reviewed    Results from last 7 days   Lab Units 06/19/25  0621 06/18/25  0651 06/17/25  0650   SODIUM mmol/L 142 140 138   POTASSIUM mmol/L 3.5 3.5 3.4*   CHLORIDE mmol/L 106 105 101   CO2 mmol/L 26 27 26   BUN mg/dL 10 13 16   CREATININE mg/dL 1.04 1.07* 1.10*   GLUCOSE mg/dL 134* 129* 116*   CALCIUM mg/dL 8.0* 8.3* 8.4*     Results from last 7 days   Lab Units 06/19/25  0621   WBC AUTO x10*3/uL 13.6*   HEMOGLOBIN g/dL 7.7*   HEMATOCRIT % 26.1*   PLATELETS AUTO x10*3/uL 533*         Imaging  CT chest abdomen pelvis w IV contrast  Result Date: 6/16/2025  CHEST: 1.  Bilateral pleural effusions with adjacent atelectasis. 2.  Coronary artery calcifications present. ABDOMEN/PELVIS: 1.  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 2.  Evidence of Nissen fundoplication, with no obstructive bowel gas pattern noted. 3.  Air-fluid levels within small bowel loops, which are nonspecific. This may be secondary to either a mild postoperative ileus, or underlying gastroenteritis. 4.  Sigmoid diverticulosis, with no evidence of diverticulitis. 5.  Right adrenal mass, indicative of an adenoma based on unenhanced CT scan from May 2025. Signed by Tylor Hidalgo MD      Cardiology, Vascular, and Other Imaging  Esophagogastroduodenoscopy (EGD) w PEG Tube  Placement  Result Date: 6/18/2025  Table formatting from the original result was not included. Impression The esophagus appeared normal. Multiple fundic gland polyps in the cardia, fundus of the stomach and body of the stomach The duodenum appeared normal. PEG tube placed in the body of the stomach Dilated 1 step in the GE junction with balloon dilator - (step 1) dilated to 15 mm. Dilation caused no change Findings The esophagus appeared normal. Multiple fundic gland polyps in the cardia, fundus of the stomach and body of the stomach. There were innumerable polyps in the cardia, fundus and body of the stomach suspected to be either related to recent surgery or fundic gland polyps The duodenum appeared normal. A PEG tube measuring 20 Fr was successfully placed in the body of the stomach using a deformable internal bolster via the pull technique after the site was identified via transillumination, visualized indentation and needle passed through abdominal wall; scope reinserted and tube rotated freely to confirm placement; distance from external bolster to external end of tube: 4.5 cm Dilated 1 step in the GE junction with 15 mm long balloon dilator - (step 1) dilated to 15 mm for 30 s. Dilation caused no change Recommendation Start using PEG tube for water and medications immediately Can start tube feeds in 4 hours Watch for complications GI to continue following  Indication Dysphasia Post-Op Diagnosis None Staff Staff Role Jeff Quan MD Proceduralist Medications See Anesthesia Record. Preprocedure A history and physical has been performed, and patient medication allergies have been reviewed. The patient's tolerance of previous anesthesia has been reviewed. The risks and benefits of the procedure and the sedation options and risks were discussed with the patient. All questions were answered and informed consent obtained. Details of the Procedure The patient underwent monitored anesthesia care, which was administered  "by an anesthesia professional. The patient's blood pressure, ECG, ETCO2, heart rate, level of consciousness, oxygen and respirations were monitored throughout the procedure. The scope was introduced through the mouth and advanced to the second part of the duodenum. Retroflexion was performed in the cardia. Prior to the procedure, the patient's H. Pylori status was unknown. The patient experienced no blood loss. The procedure was not difficult. The patient tolerated the procedure well. There were no apparent adverse events. Events Procedure Events Event Event Time ENDO SCOPE IN TIME 6/18/2025 12:13 PM ENDO SCOPE OUT TIME 6/18/2025 12:31 PM Specimens No specimens collected Procedure Location [facility] [address] [facility] [id number] [phone] Referring Provider Susy Luis PA-C Procedure Provider Jeff Quan MD         Assessment  Beatriz Raymond \"Ginny\" IS 79 y.o. female  on day  3 of admission presenting with Fever, unspecified fever cause. Actively being treated for the  following medical Problems:    Acute on chronic respiratory failure  Small bilateral pleural effusion  Small bilateral infiltrate pattern consistent with atelectasis  Improved intra-abdominal abscess/fluid collection s/p full course of IV antibiotics  Sarcoidosis currently dormant but patient is on chronic immunosuppressive therapy with leflunomide/Plaquenil    Recommendation:  Patient pulm infiltrates favor atelectasis  Will start patient on aggressive bronchopulmonary hygiene with the EZ Pap/nebulized Mucomyst  Lasix 40 mg IV x 1 due to the peripheral edema and bilateral pleural effusion  Monitor nutritional indices  Oxygen for saturation of 89 to 94%  Incentive spirometry  Bronchodilators therapy as needed  PT/OT  DVT prophylaxis  Minimize benzodiazepine and narcotics  Encourage ambulation  Head evaluation and aspiration precautions.        Miesha Shane MD  Pulmonary critical care " consultant  06/19/25  10:20 AM       STAFF PHYSICIAN NOTE OF PERSONAL INVOLVEMENT IN CARE  As the attending physician, I certify that I personally reviewed the patient's history and personally examined the patient to confirm the physical findings described above, and that I reviewed the relevant imaging studies and available reports.  I also discussed the differential diagnosis and all of the proposed management plans with the patient and individuals accompanying the patient to this visit.  They had the opportunity to ask questions about the proposed management plans and to have those questions answered.           [1] amLODIPine, 5 mg, g-tube, BID  enoxaparin, 40 mg, subcutaneous, q24h  famotidine, 20 mg, g-tube, Daily  [Held by provider] hydroxychloroquine, 300 mg, oral, Daily  [Held by provider] leflunomide, 20 mg, oral, Daily  metoprolol tartrate, 12.5 mg, g-tube, BID  oxyBUTYnin, 5 mg, g-tube, BID  pantoprazole, 40 mg, intravenous, Daily  simvastatin, 10 mg, g-tube, Daily  [Held by provider] sulfaSALAzine, 500 mg, oral, 4x daily  [2] potassium chloride-D5-0.9%NaCl, 75 mL/hr, Last Rate: 75 mL/hr (06/19/25 0411)  [3] PRN medications: acetaminophen, hydrALAZINE, loperamide, ondansetron

## 2025-06-19 NOTE — PROGRESS NOTES
"Beatriz Raymond \"Slava" is a 79 y.o. female on day 3 of admission presenting with Fever, unspecified fever cause.    Subjective   Interval History: Patient had no episodes of fever since hospitalization, she underwent PEG tube placement yesterday.   Patient denies having any cough.  Denies abdominal pain, continues to have diarrhea for which she was started on Imodium.       Review of Systems    Objective   Range of Vitals (last 24 hours)  Heart Rate:  []   Temp:  [36.5 °C (97.7 °F)-36.8 °C (98.2 °F)]   Resp:  [15-20]   BP: (157-188)/(69-84)   Height:  [162.6 cm (5' 4\")]   Weight:  [73.9 kg (163 lb)]   SpO2:  [84 %-96 %]   Daily Weight  06/18/25 : 73.9 kg (163 lb)    Body mass index is 27.98 kg/m².    Physical Exam  GENERAL: non-toxic, NAD, alert & cooperative  HEENT: normocephalic, atraumatic, sclera clear, no oral thrush  CARDIAC: regular rate & rhythm, S1/S2  PULMONARY: Rales on right base  ABDOMEN: No tenderness, PEG tube in place.  No distention  MSK: no peripheral edema, no obvious deformity  NEURO: A&O X 3, non-focal, CN II-XII grossly intact  SKIN: Warm and dry, no lesions, no rashes.  PSYCH: appropriate mood & affec    Antibiotics  nystatin - 100,000 unit/mL    Relevant Results  Labs  Results from last 72 hours   Lab Units 06/19/25  0621 06/18/25  0651 06/17/25  0650 06/16/25  1342   WBC AUTO x10*3/uL 13.6* 13.5* 17.0* 21.0*   HEMOGLOBIN g/dL 7.7* 7.8* 7.8* 8.6*   HEMATOCRIT % 26.1* 25.9* 25.6* 27.9*   PLATELETS AUTO x10*3/uL 533* 478* 458* 432   NEUTROS PCT AUTO %  --   --   --  87.5   LYMPHS PCT AUTO %  --   --   --  4.7   MONOS PCT AUTO %  --   --   --  5.7   EOS PCT AUTO %  --   --   --  0.7     Results from last 72 hours   Lab Units 06/19/25  0621 06/18/25  0651 06/17/25  0650   SODIUM mmol/L 142 140 138   POTASSIUM mmol/L 3.5 3.5 3.4*   CHLORIDE mmol/L 106 105 101   CO2 mmol/L 26 27 26   BUN mg/dL 10 13 16   CREATININE mg/dL 1.04 1.07* 1.10*   GLUCOSE mg/dL 134* 129* 116*   CALCIUM mg/dL 8.0* 8.3* " 8.4*   ANION GAP mmol/L 14 12 14   EGFR mL/min/1.73m*2 55* 53* 52*     Results from last 72 hours   Lab Units 06/17/25  0650 06/16/25  1342   ALK PHOS U/L 111 120   BILIRUBIN TOTAL mg/dL 0.3 0.3   PROTEIN TOTAL g/dL 6.1* 6.8   ALT U/L 20 24   AST U/L 14 16   ALBUMIN g/dL 2.8* 3.2*     Estimated Creatinine Clearance: 43.9 mL/min (by C-G formula based on SCr of 1.04 mg/dL).  C-Reactive Protein   Date Value Ref Range Status   06/16/2025 22.24 (H) <1.00 mg/dL Final     CRP   Date Value Ref Range Status   07/29/2020 3.81 (A) mg/dL Final     Comment:     REF VALUE  < 1.00        Microbiology:   6/16:  C. difficile PCR -not detected  Enteric stool pathogen-negative  Blood culture 6/16-no growth  Expanded respiratory viral panel-negative     Imaging     CT of the chest/abdomen/pelvis on 6/16:   CHEST:  1.  Bilateral pleural effusions with adjacent atelectasis.  2.  Coronary artery calcifications present.  ABDOMEN/PELVIS:  1.  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  2.  Evidence of Nissen fundoplication, with no obstructive bowel gaspattern noted.  3.  Air-fluid levels within small bowel loops, which are nonspecific. This may be secondary to either a mild postoperative ileus, or underlying gastroenteritis.  4.  Sigmoid diverticulosis, with no evidence of diverticulitis.  5.  Right adrenal mass, indicative of an adenoma based on unenhanced        Assessment and plan:   Immunocompromised host on leflunomide and Plaquenil who underwent underwent laparoscopic hiatal hernia repair with mesh and Nissen fundoplication on 5/21/2025. On POD1 developed tachycardia and altered mentation, and diagnostic workup revealed intra-abdominal free fluid and concern for perforated viscus. She underwent a diagnostic laparoscopy, takedown of her fundoplication which revealed a leak in the proximal part of her plication, partial gastrectomy, and redo fundoplication on 5/23/2025.  Cultures from abdominal fluid aspiration on  5/29 and again on 6/3 showed mixed gram-positive bacteria, abdominal fluid from 6/3 also revealed rare Candida albicans.  Treated with about 12 days course of pip-tazo and fluconazole and subsequently transitioned to oral linezolid and levofloxacin at home with stop date on 6/12, which she could not complete due to intolerance to pills but was prescribed oral liquid antibiotic instead, presumably Augmentin.  Review of systems is positive for low-grade fevers and confusion, no significant worsening of abdominal pain.  New onset of diarrhea in the hospital after contrast administration with negative C. difficile and enteric stool pathogen PCR.  Negative expanded respiratory viral panel and urinalysis , no clinical signs of pneumonia but changes consistent with atelectasis ,no recurrence of fevers, no growth on blood culture collected on admission.  S/p PEG tube placement on 6/18.         Plan :  Infectious disease workup is negative with negative cultures and stool studies including C. difficile and enteric PCR.  Reportedly at home patient had highest temp at 100.6 without any recurrence since hospitalization.  Stopped IV vancomycin yesterday.  Stopping empiric pip-tazo and micafungin.  Encouraged to use incentive spirometer to prevent further atelectasis.  Pulmonary team was involved with plan on more aggressive bronchial hygiene.  Recommendation to monitor off antimicrobial treatment and notify if fever reoccurs.      I spent 25  minutes in the professional and overall care of this patient.          Quincy Núñez MD

## 2025-06-20 ENCOUNTER — APPOINTMENT (OUTPATIENT)
Dept: SURGERY | Facility: CLINIC | Age: 80
End: 2025-06-20
Payer: MEDICARE

## 2025-06-20 ENCOUNTER — DOCUMENTATION (OUTPATIENT)
Dept: SURGERY | Facility: HOSPITAL | Age: 80
End: 2025-06-20
Payer: MEDICARE

## 2025-06-20 DIAGNOSIS — Z87.19 HISTORY OF REPAIR OF HIATAL HERNIA: ICD-10-CM

## 2025-06-20 DIAGNOSIS — Z98.890 HISTORY OF REPAIR OF HIATAL HERNIA: ICD-10-CM

## 2025-06-20 DIAGNOSIS — R60.0 LOWER LEG EDEMA: ICD-10-CM

## 2025-06-20 DIAGNOSIS — E86.0 DEHYDRATION: ICD-10-CM

## 2025-06-20 LAB
ALBUMIN SERPL BCP-MCNC: 2.9 G/DL (ref 3.4–5)
ALP SERPL-CCNC: 107 U/L (ref 33–136)
ALT SERPL W P-5'-P-CCNC: 19 U/L (ref 7–45)
ANION GAP SERPL CALC-SCNC: 15 MMOL/L (ref 10–20)
AST SERPL W P-5'-P-CCNC: 14 U/L (ref 9–39)
BACTERIA BLD CULT: NORMAL
BACTERIA BLD CULT: NORMAL
BILIRUB SERPL-MCNC: 0.3 MG/DL (ref 0–1.2)
BUN SERPL-MCNC: 13 MG/DL (ref 6–23)
CALCIUM SERPL-MCNC: 8.2 MG/DL (ref 8.6–10.3)
CHLORIDE SERPL-SCNC: 106 MMOL/L (ref 98–107)
CO2 SERPL-SCNC: 25 MMOL/L (ref 21–32)
CREAT SERPL-MCNC: 1.07 MG/DL (ref 0.5–1.05)
EGFRCR SERPLBLD CKD-EPI 2021: 53 ML/MIN/1.73M*2
ERYTHROCYTE [DISTWIDTH] IN BLOOD BY AUTOMATED COUNT: 16 % (ref 11.5–14.5)
GLUCOSE BLD MANUAL STRIP-MCNC: 138 MG/DL (ref 74–99)
GLUCOSE SERPL-MCNC: 101 MG/DL (ref 74–99)
HCT VFR BLD AUTO: 27.4 % (ref 36–46)
HGB BLD-MCNC: 8 G/DL (ref 12–16)
MAGNESIUM SERPL-MCNC: 1.63 MG/DL (ref 1.6–2.4)
MCH RBC QN AUTO: 26.9 PG (ref 26–34)
MCHC RBC AUTO-ENTMCNC: 29.2 G/DL (ref 32–36)
MCV RBC AUTO: 92 FL (ref 80–100)
NRBC BLD-RTO: 0 /100 WBCS (ref 0–0)
PHOSPHATE SERPL-MCNC: 2.8 MG/DL (ref 2.5–4.9)
PLATELET # BLD AUTO: 588 X10*3/UL (ref 150–450)
POTASSIUM SERPL-SCNC: 3.4 MMOL/L (ref 3.5–5.3)
PROT SERPL-MCNC: 6 G/DL (ref 6.4–8.2)
RBC # BLD AUTO: 2.97 X10*6/UL (ref 4–5.2)
SODIUM SERPL-SCNC: 143 MMOL/L (ref 136–145)
WBC # BLD AUTO: 13.1 X10*3/UL (ref 4.4–11.3)

## 2025-06-20 PROCEDURE — 36415 COLL VENOUS BLD VENIPUNCTURE: CPT

## 2025-06-20 PROCEDURE — 82947 ASSAY GLUCOSE BLOOD QUANT: CPT

## 2025-06-20 PROCEDURE — 84100 ASSAY OF PHOSPHORUS: CPT

## 2025-06-20 PROCEDURE — 83735 ASSAY OF MAGNESIUM: CPT

## 2025-06-20 PROCEDURE — 80053 COMPREHEN METABOLIC PANEL: CPT

## 2025-06-20 PROCEDURE — 1100000001 HC PRIVATE ROOM DAILY

## 2025-06-20 PROCEDURE — 2500000004 HC RX 250 GENERAL PHARMACY W/ HCPCS (ALT 636 FOR OP/ED)

## 2025-06-20 PROCEDURE — 2500000001 HC RX 250 WO HCPCS SELF ADMINISTERED DRUGS (ALT 637 FOR MEDICARE OP)

## 2025-06-20 PROCEDURE — 2500000002 HC RX 250 W HCPCS SELF ADMINISTERED DRUGS (ALT 637 FOR MEDICARE OP, ALT 636 FOR OP/ED)

## 2025-06-20 PROCEDURE — 85027 COMPLETE CBC AUTOMATED: CPT

## 2025-06-20 PROCEDURE — 94640 AIRWAY INHALATION TREATMENT: CPT

## 2025-06-20 PROCEDURE — 99232 SBSQ HOSP IP/OBS MODERATE 35: CPT | Performed by: INTERNAL MEDICINE

## 2025-06-20 PROCEDURE — 2500000004 HC RX 250 GENERAL PHARMACY W/ HCPCS (ALT 636 FOR OP/ED): Performed by: INTERNAL MEDICINE

## 2025-06-20 RX ORDER — FUROSEMIDE 10 MG/ML
20 INJECTION INTRAMUSCULAR; INTRAVENOUS ONCE
Status: COMPLETED | OUTPATIENT
Start: 2025-06-20 | End: 2025-06-20

## 2025-06-20 RX ORDER — MAGNESIUM CHLORIDE 64 MG
64 TABLET, DELAYED RELEASE (ENTERIC COATED) ORAL DAILY
Status: DISCONTINUED | OUTPATIENT
Start: 2025-06-20 | End: 2025-06-24 | Stop reason: HOSPADM

## 2025-06-20 RX ORDER — POTASSIUM CHLORIDE 1.5 G/1.58G
40 POWDER, FOR SOLUTION ORAL ONCE
Status: COMPLETED | OUTPATIENT
Start: 2025-06-20 | End: 2025-06-20

## 2025-06-20 RX ADMIN — METOPROLOL TARTRATE 12.5 MG: 25 TABLET, FILM COATED ORAL at 09:33

## 2025-06-20 RX ADMIN — HYDRALAZINE HYDROCHLORIDE 5 MG: 20 INJECTION INTRAMUSCULAR; INTRAVENOUS at 04:33

## 2025-06-20 RX ADMIN — OXYBUTYNIN CHLORIDE 5 MG: 5 TABLET ORAL at 20:46

## 2025-06-20 RX ADMIN — ENOXAPARIN SODIUM 40 MG: 100 INJECTION SUBCUTANEOUS at 22:35

## 2025-06-20 RX ADMIN — AMLODIPINE BESYLATE 5 MG: 5 TABLET ORAL at 20:46

## 2025-06-20 RX ADMIN — SIMVASTATIN 10 MG: 10 TABLET, FILM COATED ORAL at 09:34

## 2025-06-20 RX ADMIN — OXYBUTYNIN CHLORIDE 5 MG: 5 TABLET ORAL at 09:33

## 2025-06-20 RX ADMIN — MAGNESIUM 64 MG (MAGNESIUM CHLORIDE) TABLET,DELAYED RELEASE 1 TABLET: at 16:08

## 2025-06-20 RX ADMIN — AMLODIPINE BESYLATE 5 MG: 5 TABLET ORAL at 09:33

## 2025-06-20 RX ADMIN — FAMOTIDINE 20 MG: 20 TABLET, FILM COATED ORAL at 09:33

## 2025-06-20 RX ADMIN — POTASSIUM CHLORIDE 40 MEQ: 1.5 POWDER, FOR SOLUTION ORAL at 16:09

## 2025-06-20 RX ADMIN — FUROSEMIDE 20 MG: 10 INJECTION, SOLUTION INTRAMUSCULAR; INTRAVENOUS at 13:37

## 2025-06-20 RX ADMIN — METOPROLOL TARTRATE 12.5 MG: 25 TABLET, FILM COATED ORAL at 20:46

## 2025-06-20 RX ADMIN — PANTOPRAZOLE SODIUM 40 MG: 40 INJECTION, POWDER, FOR SOLUTION INTRAVENOUS at 09:34

## 2025-06-20 ASSESSMENT — COGNITIVE AND FUNCTIONAL STATUS - GENERAL
CLIMB 3 TO 5 STEPS WITH RAILING: A LITTLE
TOILETING: A LITTLE
HELP NEEDED FOR BATHING: A LITTLE
WALKING IN HOSPITAL ROOM: A LITTLE
TOILETING: A LITTLE
HELP NEEDED FOR BATHING: A LITTLE
CLIMB 3 TO 5 STEPS WITH RAILING: A LITTLE
DAILY ACTIVITIY SCORE: 22
MOVING TO AND FROM BED TO CHAIR: A LITTLE
MOBILITY SCORE: 21
MOBILITY SCORE: 21
WALKING IN HOSPITAL ROOM: A LITTLE
MOVING TO AND FROM BED TO CHAIR: A LITTLE
DAILY ACTIVITIY SCORE: 22

## 2025-06-20 ASSESSMENT — PAIN SCALES - GENERAL
PAINLEVEL_OUTOF10: 0 - NO PAIN
PAINLEVEL_OUTOF10: 0 - NO PAIN

## 2025-06-20 NOTE — CARE PLAN
The patient's goals for the shift include rest    The clinical goals for the shift include comfort and safety      Problem: Pain - Adult  Goal: Verbalizes/displays adequate comfort level or baseline comfort level  Outcome: Progressing  Flowsheets (Taken 6/19/2025 2349)  Verbalizes/displays adequate comfort level or baseline comfort level: Encourage patient to monitor pain and request assistance     Problem: Safety - Adult  Goal: Free from fall injury  Outcome: Progressing  Flowsheets (Taken 6/19/2025 2349)  Free from fall injury: Instruct family/caregiver on patient safety     Problem: Discharge Planning  Goal: Discharge to home or other facility with appropriate resources  Outcome: Progressing  Flowsheets (Taken 6/19/2025 2349)  Discharge to home or other facility with appropriate resources: Identify barriers to discharge with patient and caregiver     Problem: Chronic Conditions and Co-morbidities  Goal: Patient's chronic conditions and co-morbidity symptoms are monitored and maintained or improved  Outcome: Progressing  Flowsheets (Taken 6/19/2025 2349)  Care Plan - Patient's Chronic Conditions and Co-Morbidity Symptoms are Monitored and Maintained or Improved: Monitor and assess patient's chronic conditions and comorbid symptoms for stability, deterioration, or improvement     Problem: Nutrition  Goal: Nutrient intake appropriate for maintaining nutritional needs  Outcome: Progressing     Problem: Skin  Goal: Decreased wound size/increased tissue granulation at next dressing change  Outcome: Progressing  Flowsheets (Taken 6/19/2025 2349)  Decreased wound size/increased tissue granulation at next dressing change: Promote sleep for wound healing     Problem: Pain  Goal: Takes deep breaths with improved pain control throughout the shift  Outcome: Progressing

## 2025-06-20 NOTE — PROGRESS NOTES
06/20/25 1637   Discharge Planning   Living Arrangements Spouse/significant other   Support Systems Spouse/significant other;Children;Family members   Expected Discharge Disposition SNF   Does the patient need discharge transport arranged? Yes   RoundTrip coordination needed? Yes   Patient Choice   Provider Choice list and CMS website (https://medicare.gov/care-compare#search) for post-acute Quality and Resource Measure Data were provided and reviewed with: Family;Patient   Intensity of Service   Intensity of Service 0-30 min     Patient and her  are now considering SNF for therapies and PEG tube care.  I provided a list of facilities near their home.  Per their request, I called their daughter Antonia Raymond ([phone]) in order to email facility list to her so she can assist with selections; I left [facility] requesting return call.  Care Coordination team to follow up with patient for choices, unless she decides on home health care.  Blair DANGELO TCC

## 2025-06-20 NOTE — PROGRESS NOTES
Patient examined on the medical floor.    Subjectively feeling better, having abdominal pain and diarrhea immediately after tube feeds.    She has been afebrile.    WBC count is 13K, she is off antibiotics.    CT scan was negative for any intra-abdominal fluid collection.     For pleural effusion she has been assessed by pulmonologist.    Clinical progress and events during this hospitalization noted and input from all subspecialties appreciated.    Recommendations:    From surgical standpoint no acute surgical intervention is needed.    Will suggest tube feeds via pump if she does not tolerate bolus tube feeds.    Decision regarding antibiotics per medical and ID.  Please consider resuming antibiotics if WBC increase or if she has any fevers again.    Decision regarding pleural effusion management per pulmonologist.  Please consider aspiration/drainage if pulmonary status does not improve.    From surgical standpoint patient can have p.o. intake as well.    Please do not hesitate to contact me to discuss further.

## 2025-06-20 NOTE — PROGRESS NOTES
"Subjective   Beatriz Raymond \"Slava" is a 79 y.o. female who presents with Fever.    Patient states she has been feeling full after tube feeds. This morning she only had about half of the TF bolus due to pain. ID has stopped all abx 6/19. Will order 20 IV lasix once today, patient on 4L NC this morning, encouraging continued incentive spirometry.    Objective   Vitals:    06/20/25 1137   BP: 148/70   Pulse: 82   Resp:    Temp: 36.6 °C (97.9 °F)   SpO2: 98%      Physical Exam  Physical Exam:  Constitutional: frail, awake, alert, no acute distress  ENMT: mucous membranes dry, EOMI, conjunctivae clear  Head/Neck: normocephalic, atraumatic; supple, trachea midline  Respiratory/Thorax: rhonchi  Cardiovascular: RRR, no murmur  Gastrointestinal: diffuse tenderness, bowel sounds present  Extremities: palpable peripheral pulses, no edema or cyanosis  Neurological: AO x3, no focal deficits  Psychological: appropriate mood and behavior  Skin: warm and dry, PEG tube present    Assessment/Plan       Ginny Raymond is a 79 y.o. female with PMH of HTN, HLD, GERD, OA/RA, sarcoidosis recent laparoscopic hiatal hernia repair 5/21/2025 c/b peritonitis who presented to [facility] with lethargy, fevers and poor oral intake, admitted fever with leukocytosis.    Acute medical conditions:  #Gastroenteritis with diarrhea in setting of recent lap hiatal hernia repair 5/21/2025 c/b peritonitis  #Dysphagia with malnutrition in setting of above s/p PEG 6/18  #Bilat pleural effusions with atelectasis  #SIRS without sepsis  -PEG placed by GI 6/18, continue TF as tolerated, patient ok to eat PO as tolerated  -ID stopped all abx 6/19, continue to monitor for fevers  -Diarrhea remains, stool panel and c diff unremarkable, imodium PRN  -Pulm following, aggressive bronchopulm hygiene, continue to wean supplemental O2 as able, 1 time 20IV lasix ordered today  -PT/OT, [facility] 17    Chronic medical conditions:  #Hypertension  #Hyperlipidemia  #GERD  #OA, " RA  #Sarcoidosis  -Held Plaquenil, leflunomide, and sulfasalazine in setting of infection  -Continue home medications: Simvastatin, Protonix, oxybutynin, metoprolol tartrate, amlodipine    DVT PPX: Lovenox  Diet: Regular diet, TF via PEG  IVF:  Code Status: FULL    This is a preliminary note written by the resident. Please wait for attending addendum for finalization of note and recommendations.    Venkat Dawson DO, PhD  Internal Medicine PGY2

## 2025-06-20 NOTE — CARE PLAN
The patient's goals for the shift include rest    The clinical goals for the shift include comfort and safety    Over the shift, the patient did not make progress toward the following goals. Barriers to progression include assist as needed.

## 2025-06-21 LAB
ANION GAP SERPL CALC-SCNC: 14 MMOL/L (ref 10–20)
BUN SERPL-MCNC: 16 MG/DL (ref 6–23)
CALCIUM SERPL-MCNC: 8.1 MG/DL (ref 8.6–10.3)
CHLORIDE SERPL-SCNC: 103 MMOL/L (ref 98–107)
CO2 SERPL-SCNC: 28 MMOL/L (ref 21–32)
CREAT SERPL-MCNC: 1.01 MG/DL (ref 0.5–1.05)
EGFRCR SERPLBLD CKD-EPI 2021: 57 ML/MIN/1.73M*2
ERYTHROCYTE [DISTWIDTH] IN BLOOD BY AUTOMATED COUNT: 16.3 % (ref 11.5–14.5)
GLUCOSE SERPL-MCNC: 104 MG/DL (ref 74–99)
HCT VFR BLD AUTO: 24.8 % (ref 36–46)
HGB BLD-MCNC: 7.5 G/DL (ref 12–16)
MAGNESIUM SERPL-MCNC: 1.58 MG/DL (ref 1.6–2.4)
MCH RBC QN AUTO: 27.7 PG (ref 26–34)
MCHC RBC AUTO-ENTMCNC: 30.2 G/DL (ref 32–36)
MCV RBC AUTO: 92 FL (ref 80–100)
NRBC BLD-RTO: 0 /100 WBCS (ref 0–0)
PHOSPHATE SERPL-MCNC: 3.1 MG/DL (ref 2.5–4.9)
PLATELET # BLD AUTO: 503 X10*3/UL (ref 150–450)
POTASSIUM SERPL-SCNC: 3.6 MMOL/L (ref 3.5–5.3)
RBC # BLD AUTO: 2.71 X10*6/UL (ref 4–5.2)
SODIUM SERPL-SCNC: 141 MMOL/L (ref 136–145)
WBC # BLD AUTO: 10.5 X10*3/UL (ref 4.4–11.3)

## 2025-06-21 PROCEDURE — 84100 ASSAY OF PHOSPHORUS: CPT

## 2025-06-21 PROCEDURE — 83735 ASSAY OF MAGNESIUM: CPT

## 2025-06-21 PROCEDURE — 85027 COMPLETE CBC AUTOMATED: CPT

## 2025-06-21 PROCEDURE — 2500000002 HC RX 250 W HCPCS SELF ADMINISTERED DRUGS (ALT 637 FOR MEDICARE OP, ALT 636 FOR OP/ED)

## 2025-06-21 PROCEDURE — 99232 SBSQ HOSP IP/OBS MODERATE 35: CPT | Performed by: STUDENT IN AN ORGANIZED HEALTH CARE EDUCATION/TRAINING PROGRAM

## 2025-06-21 PROCEDURE — 99232 SBSQ HOSP IP/OBS MODERATE 35: CPT | Performed by: INTERNAL MEDICINE

## 2025-06-21 PROCEDURE — 36415 COLL VENOUS BLD VENIPUNCTURE: CPT

## 2025-06-21 PROCEDURE — 80048 BASIC METABOLIC PNL TOTAL CA: CPT

## 2025-06-21 PROCEDURE — 2500000004 HC RX 250 GENERAL PHARMACY W/ HCPCS (ALT 636 FOR OP/ED)

## 2025-06-21 PROCEDURE — 2500000004 HC RX 250 GENERAL PHARMACY W/ HCPCS (ALT 636 FOR OP/ED): Performed by: INTERNAL MEDICINE

## 2025-06-21 PROCEDURE — 94640 AIRWAY INHALATION TREATMENT: CPT

## 2025-06-21 PROCEDURE — 1100000001 HC PRIVATE ROOM DAILY

## 2025-06-21 PROCEDURE — 2500000001 HC RX 250 WO HCPCS SELF ADMINISTERED DRUGS (ALT 637 FOR MEDICARE OP)

## 2025-06-21 RX ORDER — MAGNESIUM SULFATE HEPTAHYDRATE 40 MG/ML
2 INJECTION, SOLUTION INTRAVENOUS ONCE
Status: COMPLETED | OUTPATIENT
Start: 2025-06-21 | End: 2025-06-21

## 2025-06-21 RX ADMIN — ENOXAPARIN SODIUM 40 MG: 100 INJECTION SUBCUTANEOUS at 22:56

## 2025-06-21 RX ADMIN — METOPROLOL TARTRATE 12.5 MG: 25 TABLET, FILM COATED ORAL at 08:42

## 2025-06-21 RX ADMIN — SIMVASTATIN 10 MG: 10 TABLET, FILM COATED ORAL at 08:42

## 2025-06-21 RX ADMIN — OXYBUTYNIN CHLORIDE 5 MG: 5 TABLET ORAL at 20:41

## 2025-06-21 RX ADMIN — METOPROLOL TARTRATE 12.5 MG: 25 TABLET, FILM COATED ORAL at 20:41

## 2025-06-21 RX ADMIN — OXYBUTYNIN CHLORIDE 5 MG: 5 TABLET ORAL at 08:42

## 2025-06-21 RX ADMIN — AMLODIPINE BESYLATE 5 MG: 5 TABLET ORAL at 08:42

## 2025-06-21 RX ADMIN — MAGNESIUM 64 MG (MAGNESIUM CHLORIDE) TABLET,DELAYED RELEASE 1 TABLET: at 11:06

## 2025-06-21 RX ADMIN — AMLODIPINE BESYLATE 5 MG: 5 TABLET ORAL at 20:41

## 2025-06-21 RX ADMIN — FAMOTIDINE 20 MG: 20 TABLET, FILM COATED ORAL at 08:42

## 2025-06-21 RX ADMIN — LOPERAMIDE HYDROCHLORIDE 2 MG: 2 CAPSULE ORAL at 16:16

## 2025-06-21 RX ADMIN — MAGNESIUM SULFATE HEPTAHYDRATE 2 G: 40 INJECTION, SOLUTION INTRAVENOUS at 09:53

## 2025-06-21 ASSESSMENT — COGNITIVE AND FUNCTIONAL STATUS - GENERAL
MOBILITY SCORE: 20
CLIMB 3 TO 5 STEPS WITH RAILING: A LITTLE
MOVING TO AND FROM BED TO CHAIR: A LITTLE
STANDING UP FROM CHAIR USING ARMS: A LITTLE
DAILY ACTIVITIY SCORE: 21
WALKING IN HOSPITAL ROOM: A LITTLE
TOILETING: A LITTLE
DAILY ACTIVITIY SCORE: 22
MOBILITY SCORE: 21
CLIMB 3 TO 5 STEPS WITH RAILING: A LITTLE
MOVING TO AND FROM BED TO CHAIR: A LITTLE
DRESSING REGULAR UPPER BODY CLOTHING: A LITTLE
TOILETING: A LITTLE
HELP NEEDED FOR BATHING: A LITTLE
HELP NEEDED FOR BATHING: A LITTLE
WALKING IN HOSPITAL ROOM: A LITTLE

## 2025-06-21 ASSESSMENT — PAIN SCALES - GENERAL
PAINLEVEL_OUTOF10: 0 - NO PAIN
PAINLEVEL_OUTOF10: 0 - NO PAIN

## 2025-06-21 NOTE — PROGRESS NOTES
"Subjective   Beatriz Raymond \"Slava" is a 79 y.o. female who presents with Fever.    Patient reports that she has not really been able to tolerate oral intake, also reports about 8 loose bowel movements since last night    Objective   Vitals:    06/21/25 1155   BP: 175/76   Pulse: 80   Resp:    Temp: 36.7 °C (98.1 °F)   SpO2: 98%      Physical Exam  Physical Exam:  Constitutional: frail, awake, alert, no acute distress  ENMT: mucous membranes dry, EOMI, conjunctivae clear  Head/Neck: normocephalic, atraumatic; supple, trachea midline  Respiratory/Thorax: rhonchi  Cardiovascular: RRR, no murmur  Gastrointestinal: diffuse tenderness, bowel sounds present  Extremities: palpable peripheral pulses, no edema or cyanosis  Neurological: AO x3, no focal deficits  Psychological: appropriate mood and behavior  Skin: warm and dry, PEG tube present    Assessment/Plan       Ginny Raymond is a 79 y.o. female with PMH of HTN, HLD, GERD, OA/RA, sarcoidosis recent laparoscopic hiatal hernia repair 5/21/2025 c/b peritonitis who presented to [facility] with lethargy, fevers and poor oral intake, admitted fever with leukocytosis.    Acute medical conditions:  #Gastroenteritis with diarrhea in setting of recent lap hiatal hernia repair 5/21/2025 c/b peritonitis  #Dysphagia with malnutrition in setting of above s/p PEG 6/18  #Bilat pleural effusions with atelectasis  #SIRS without sepsis  -PEG placed by GI 6/18, continue TF as tolerated, patient ok to eat PO as tolerated  -ID stopped all abx 6/19, continue to monitor for fevers  -Stool panel and C. difficile unremarkable, continued diarrhea despite Imodium.  General surgery recommending switching tube feeds, will defer to nutritionist for further recommendations regarding this  -Diarrhea remains, stool panel and c diff unremarkable, imodium PRN  -Pulm following, aggressive bronchopulm hygiene, currently still requiring 4 L via nasal cannula, please wean as tolerated  -PT/OT, [facility] 17    Chronic " medical conditions:  #Hypertension  #Hyperlipidemia  #GERD  #OA, RA  #Sarcoidosis  -Held Plaquenil, leflunomide, and sulfasalazine in setting of infection  -Continue home medications: Simvastatin, Protonix, oxybutynin, metoprolol tartrate, amlodipine    DVT PPX: Lovenox  Diet: Regular diet, TF via PEG  IVF:  Code Status: FULL    This is a preliminary note written by the resident. Please wait for attending addendum for finalization of note and recommendations.    Dr. Claudio Juárez  Internal Medicine

## 2025-06-21 NOTE — CARE PLAN
The patient's goals for the shift include rest    The clinical goals for the shift include comfort and safety    Over the shift, the patient is making progress toward the following goals

## 2025-06-21 NOTE — CARE PLAN
The clinical goals for the shift include comfort and safety      Problem: Pain - Adult  Goal: Verbalizes/displays adequate comfort level or baseline comfort level  Outcome: Progressing  Flowsheets (Taken 6/21/2025 0026)  Verbalizes/displays adequate comfort level or baseline comfort level: Encourage patient to monitor pain and request assistance     Problem: Safety - Adult  Goal: Free from fall injury  Outcome: Progressing  Flowsheets (Taken 6/21/2025 0026)  Free from fall injury: Instruct family/caregiver on patient safety     Problem: Discharge Planning  Goal: Discharge to home or other facility with appropriate resources  Outcome: Progressing  Flowsheets (Taken 6/21/2025 0026)  Discharge to home or other facility with appropriate resources: Identify barriers to discharge with patient and caregiver     Problem: Chronic Conditions and Co-morbidities  Goal: Patient's chronic conditions and co-morbidity symptoms are monitored and maintained or improved  Outcome: Progressing  Flowsheets (Taken 6/21/2025 0026)  Care Plan - Patient's Chronic Conditions and Co-Morbidity Symptoms are Monitored and Maintained or Improved: Monitor and assess patient's chronic conditions and comorbid symptoms for stability, deterioration, or improvement     Problem: Nutrition  Goal: Nutrient intake appropriate for maintaining nutritional needs  Outcome: Progressing     Problem: Skin  Goal: Decreased wound size/increased tissue granulation at next dressing change  Outcome: Progressing  Flowsheets (Taken 6/21/2025 0026)  Decreased wound size/increased tissue granulation at next dressing change: Promote sleep for wound healing  Goal: Participates in plan/prevention/treatment measures  Outcome: Progressing  Flowsheets (Taken 6/21/2025 0026)  Participates in plan/prevention/treatment measures: Elevate heels  Goal: Prevent/manage excess moisture  Outcome: Progressing  Flowsheets (Taken 6/21/2025 0026)  Prevent/manage excess moisture: Monitor  for/manage infection if present  Goal: Prevent/minimize sheer/friction injuries  Outcome: Progressing  Flowsheets (Taken 6/21/2025 0026)  Prevent/minimize sheer/friction injuries: HOB 30 degrees or less  Goal: Promote/optimize nutrition  Outcome: Progressing  Flowsheets (Taken 6/21/2025 0026)  Promote/optimize nutrition: Monitor/record intake including meals  Goal: Promote skin healing  Outcome: Progressing  Flowsheets (Taken 6/21/2025 0026)  Promote skin healing: Assess skin/pad under line(s)/device(s)     Problem: Pain  Goal: Takes deep breaths with improved pain control throughout the shift  Outcome: Progressing  Goal: Turns in bed with improved pain control throughout the shift  Outcome: Progressing  Goal: Walks with improved pain control throughout the shift  Outcome: Progressing  Goal: Performs ADL's with improved pain control throughout shift  Outcome: Progressing  Goal: Participates in PT with improved pain control throughout the shift  Outcome: Progressing  Goal: Free from opioid side effects throughout the shift  Outcome: Progressing  Goal: Free from acute confusion related to pain meds throughout the shift  Outcome: Progressing     Problem: Fall/Injury  Goal: Not fall by end of shift  Outcome: Progressing  Goal: Be free from injury by end of the shift  Outcome: Progressing  Goal: Verbalize understanding of personal risk factors for fall in the hospital  Outcome: Progressing  Goal: Verbalize understanding of risk factor reduction measures to prevent injury from fall in the home  Outcome: Progressing  Goal: Use assistive devices by end of the shift  Outcome: Progressing  Goal: Pace activities to prevent fatigue by end of the shift  Outcome: Progressing

## 2025-06-21 NOTE — PROGRESS NOTES
"Beatriz Raymond \"Slava" is a 79 y.o. female on day 5 of admission presenting with Fever, unspecified fever cause.    Subjective   Patient seen this morning.  Had 4-5 episodes of diarrhea after being tube fed.  No nausea or vomiting, no abdominal pain, no fever or chills.  Still complains of a dry mouth     Objective     Physical Exam  Patient is in no acute distress  No respiratory distress  Abdomen is soft, not tender, not distended  G-tube in place, no surrounding erythema or drainage  No guarding   Incisions are clean dry and intact  Patient is alert and oriented x 3    Last Recorded Vitals  Blood pressure 161/74, pulse 94, temperature 37 °C (98.6 °F), temperature source Temporal, resp. rate 18, height 1.626 m (5' 4\"), weight 73.9 kg (163 lb), SpO2 96%.    Intake/Output last 3 Shifts:  I/O last 3 completed shifts:  In: 1100 (14.9 mL/kg) [P.O.:100; NG/GT:1000]  Out: - (0 mL/kg)   Weight: 73.9 kg     Assessment: 79-year-old female well-known to the surgical team and who had a complicated laparoscopic hiatal hernia repair requiring a takeback for leak.  She returned to the hospital with poor oral intake, bilateral pleural effusions and leukocytosis.  Her leukocytosis has resolved.  Her current symptoms remain diarrhea and her dry mouth.  From a surgical standpoint patient is allowed to have an oral diet in addition to tube feeds.  No surgical intervention is indicated at this time    Plan:  - Would consider making Imodium round-the-clock until diarrhea resolves  - Would also consider changing the tube feed formula as this may be contributing to diarrhea  - No surgical intervention.  Please contact surgical team as needed    I spent 30 minutes in the professional and overall care of this patient.      Addy Khalil MD    "

## 2025-06-22 VITALS
OXYGEN SATURATION: 97 % | HEART RATE: 79 BPM | HEIGHT: 64 IN | TEMPERATURE: 97.9 F | WEIGHT: 163 LBS | BODY MASS INDEX: 27.83 KG/M2 | RESPIRATION RATE: 18 BRPM | DIASTOLIC BLOOD PRESSURE: 74 MMHG | SYSTOLIC BLOOD PRESSURE: 155 MMHG

## 2025-06-22 LAB
ANION GAP SERPL CALC-SCNC: 13 MMOL/L (ref 10–20)
BUN SERPL-MCNC: 13 MG/DL (ref 6–23)
CALCIUM SERPL-MCNC: 8.7 MG/DL (ref 8.6–10.3)
CHLORIDE SERPL-SCNC: 102 MMOL/L (ref 98–107)
CO2 SERPL-SCNC: 30 MMOL/L (ref 21–32)
CREAT SERPL-MCNC: 1.06 MG/DL (ref 0.5–1.05)
EGFRCR SERPLBLD CKD-EPI 2021: 54 ML/MIN/1.73M*2
ERYTHROCYTE [DISTWIDTH] IN BLOOD BY AUTOMATED COUNT: 16.1 % (ref 11.5–14.5)
GLUCOSE SERPL-MCNC: 154 MG/DL (ref 74–99)
HCT VFR BLD AUTO: 28.6 % (ref 36–46)
HGB BLD-MCNC: 8.4 G/DL (ref 12–16)
MAGNESIUM SERPL-MCNC: 1.96 MG/DL (ref 1.6–2.4)
MCH RBC QN AUTO: 27 PG (ref 26–34)
MCHC RBC AUTO-ENTMCNC: 29.4 G/DL (ref 32–36)
MCV RBC AUTO: 92 FL (ref 80–100)
NRBC BLD-RTO: 0 /100 WBCS (ref 0–0)
PLATELET # BLD AUTO: 624 X10*3/UL (ref 150–450)
POTASSIUM SERPL-SCNC: 3.6 MMOL/L (ref 3.5–5.3)
RBC # BLD AUTO: 3.11 X10*6/UL (ref 4–5.2)
SODIUM SERPL-SCNC: 141 MMOL/L (ref 136–145)
WBC # BLD AUTO: 11.7 X10*3/UL (ref 4.4–11.3)

## 2025-06-22 PROCEDURE — 83735 ASSAY OF MAGNESIUM: CPT

## 2025-06-22 PROCEDURE — 2500000001 HC RX 250 WO HCPCS SELF ADMINISTERED DRUGS (ALT 637 FOR MEDICARE OP): Performed by: INTERNAL MEDICINE

## 2025-06-22 PROCEDURE — 2500000002 HC RX 250 W HCPCS SELF ADMINISTERED DRUGS (ALT 637 FOR MEDICARE OP, ALT 636 FOR OP/ED)

## 2025-06-22 PROCEDURE — 2500000004 HC RX 250 GENERAL PHARMACY W/ HCPCS (ALT 636 FOR OP/ED): Performed by: INTERNAL MEDICINE

## 2025-06-22 PROCEDURE — 94640 AIRWAY INHALATION TREATMENT: CPT

## 2025-06-22 PROCEDURE — 99232 SBSQ HOSP IP/OBS MODERATE 35: CPT | Performed by: INTERNAL MEDICINE

## 2025-06-22 PROCEDURE — 2500000001 HC RX 250 WO HCPCS SELF ADMINISTERED DRUGS (ALT 637 FOR MEDICARE OP)

## 2025-06-22 PROCEDURE — 2500000002 HC RX 250 W HCPCS SELF ADMINISTERED DRUGS (ALT 637 FOR MEDICARE OP, ALT 636 FOR OP/ED): Performed by: INTERNAL MEDICINE

## 2025-06-22 PROCEDURE — 2500000004 HC RX 250 GENERAL PHARMACY W/ HCPCS (ALT 636 FOR OP/ED)

## 2025-06-22 PROCEDURE — 85027 COMPLETE CBC AUTOMATED: CPT

## 2025-06-22 PROCEDURE — 1100000001 HC PRIVATE ROOM DAILY

## 2025-06-22 PROCEDURE — 80048 BASIC METABOLIC PNL TOTAL CA: CPT

## 2025-06-22 PROCEDURE — 36415 COLL VENOUS BLD VENIPUNCTURE: CPT

## 2025-06-22 RX ORDER — HYDROXYCHLOROQUINE SULFATE 200 MG/1
300 TABLET, FILM COATED ORAL DAILY
Status: DISCONTINUED | OUTPATIENT
Start: 2025-06-22 | End: 2025-06-24 | Stop reason: HOSPADM

## 2025-06-22 RX ORDER — LEFLUNOMIDE 20 MG/1
20 TABLET ORAL DAILY
Status: DISCONTINUED | OUTPATIENT
Start: 2025-06-22 | End: 2025-06-24 | Stop reason: HOSPADM

## 2025-06-22 RX ORDER — SULFASALAZINE 500 MG/1
500 TABLET ORAL 4 TIMES DAILY
Status: DISCONTINUED | OUTPATIENT
Start: 2025-06-22 | End: 2025-06-24 | Stop reason: HOSPADM

## 2025-06-22 RX ORDER — FUROSEMIDE 40 MG/1
40 TABLET ORAL DAILY
Status: COMPLETED | OUTPATIENT
Start: 2025-06-23 | End: 2025-06-23

## 2025-06-22 RX ORDER — FUROSEMIDE 40 MG/1
40 TABLET ORAL DAILY
Status: DISCONTINUED | OUTPATIENT
Start: 2025-06-22 | End: 2025-06-22

## 2025-06-22 RX ADMIN — AMLODIPINE BESYLATE 5 MG: 5 TABLET ORAL at 20:58

## 2025-06-22 RX ADMIN — LOPERAMIDE HYDROCHLORIDE 2 MG: 2 CAPSULE ORAL at 10:59

## 2025-06-22 RX ADMIN — LEFLUNOMIDE 20 MG: 20 TABLET ORAL at 10:59

## 2025-06-22 RX ADMIN — FAMOTIDINE 20 MG: 20 TABLET, FILM COATED ORAL at 09:02

## 2025-06-22 RX ADMIN — SIMVASTATIN 10 MG: 10 TABLET, FILM COATED ORAL at 09:02

## 2025-06-22 RX ADMIN — MAGNESIUM 64 MG (MAGNESIUM CHLORIDE) TABLET,DELAYED RELEASE 1 TABLET: at 09:03

## 2025-06-22 RX ADMIN — PANTOPRAZOLE SODIUM 40 MG: 40 INJECTION, POWDER, FOR SOLUTION INTRAVENOUS at 09:02

## 2025-06-22 RX ADMIN — SULFASALAZINE 500 MG: 500 TABLET ORAL at 16:14

## 2025-06-22 RX ADMIN — SULFASALAZINE 500 MG: 500 TABLET ORAL at 20:58

## 2025-06-22 RX ADMIN — HYDROXYCHLOROQUINE SULFATE 300 MG: 200 TABLET, FILM COATED ORAL at 09:02

## 2025-06-22 RX ADMIN — ENOXAPARIN SODIUM 40 MG: 100 INJECTION SUBCUTANEOUS at 23:11

## 2025-06-22 RX ADMIN — AMLODIPINE BESYLATE 5 MG: 5 TABLET ORAL at 09:02

## 2025-06-22 RX ADMIN — OXYBUTYNIN CHLORIDE 5 MG: 5 TABLET ORAL at 20:58

## 2025-06-22 RX ADMIN — OXYBUTYNIN CHLORIDE 5 MG: 5 TABLET ORAL at 09:02

## 2025-06-22 RX ADMIN — METOPROLOL TARTRATE 12.5 MG: 25 TABLET, FILM COATED ORAL at 09:02

## 2025-06-22 RX ADMIN — METOPROLOL TARTRATE 12.5 MG: 25 TABLET, FILM COATED ORAL at 20:58

## 2025-06-22 ASSESSMENT — COGNITIVE AND FUNCTIONAL STATUS - GENERAL
CLIMB 3 TO 5 STEPS WITH RAILING: A LOT
TOILETING: A LITTLE
DRESSING REGULAR LOWER BODY CLOTHING: A LITTLE
STANDING UP FROM CHAIR USING ARMS: A LITTLE
WALKING IN HOSPITAL ROOM: A LITTLE
DRESSING REGULAR UPPER BODY CLOTHING: A LITTLE
MOVING TO AND FROM BED TO CHAIR: A LITTLE
CLIMB 3 TO 5 STEPS WITH RAILING: A LITTLE
MOBILITY SCORE: 19
HELP NEEDED FOR BATHING: A LITTLE
MOVING TO AND FROM BED TO CHAIR: A LITTLE
MOBILITY SCORE: 20
TOILETING: A LITTLE
DRESSING REGULAR UPPER BODY CLOTHING: A LITTLE
WALKING IN HOSPITAL ROOM: A LITTLE
HELP NEEDED FOR BATHING: A LITTLE
DAILY ACTIVITIY SCORE: 20
STANDING UP FROM CHAIR USING ARMS: A LITTLE
DAILY ACTIVITIY SCORE: 21

## 2025-06-22 ASSESSMENT — PAIN SCALES - GENERAL
PAINLEVEL_OUTOF10: 0 - NO PAIN
PAINLEVEL_OUTOF10: 0 - NO PAIN

## 2025-06-22 NOTE — CARE PLAN
Problem: Chronic Conditions and Co-morbidities  Goal: Patient's chronic conditions and co-morbidity symptoms are monitored and maintained or improved  Outcome: Progressing     Problem: Nutrition  Goal: Nutrient intake appropriate for maintaining nutritional needs  Outcome: Progressing   The patient's goals for the shift include rest    The clinical goals for the shift include will tolerate tube feeds on this shift    Patient requested 100 ml bolus approx. 2240 hrs last night instead of the 200 ml ordered. Also requested to have next bolus towards end of shift since she's been making multiple trips to the bathroom (for the past few days and nights) and not being able to sleep. Multiple episodes of liquid stool on both day and night shift. Patient willing to try ordered 200 ml bolus before end of this shift.

## 2025-06-22 NOTE — ED PROVIDER NOTES
HPI   Chief Complaint   Patient presents with    Fever       Patient is a 79-year-old female past medical history as below presenting today for evaluation of fever.  Recent surgery.  Denies any new abdominal pain.  No cough sore throat runny nose dysuria diarrhea.              Patient History   Medical History[1]  Surgical History[2]  Family History[3]  Social History[4]    Physical Exam   ED Triage Vitals   Temp Heart Rate Resp BP   06/16/25 1224 06/16/25 1224 06/16/25 1224 06/16/25 1224   36.6 °C (97.9 °F) (!) 108 18 175/79      SpO2 Temp Source Heart Rate Source Patient Position   06/16/25 1224 06/16/25 2150 06/18/25 1134 06/18/25 1134   (!) 92 % Temporal Monitor Lying      BP Location FiO2 (%)     06/18/25 1134 --     Left arm        Physical Exam  Vitals and nursing note reviewed.   Constitutional:       Appearance: Normal appearance.   HENT:      Head: Normocephalic and atraumatic.      Nose: Nose normal.      Mouth/Throat:      Mouth: Mucous membranes are moist.   Eyes:      Conjunctiva/sclera: Conjunctivae normal.   Cardiovascular:      Rate and Rhythm: Normal rate and regular rhythm.      Pulses: Normal pulses.      Heart sounds: Normal heart sounds.   Pulmonary:      Effort: Pulmonary effort is normal.      Breath sounds: Normal breath sounds.   Abdominal:      General: Abdomen is flat.      Palpations: Abdomen is soft.      Tenderness: There is no abdominal tenderness. There is no guarding or rebound.      Comments: Surgical site right upper abdomen does show some purulence.   Musculoskeletal:         General: No deformity.   Neurological:      General: No focal deficit present.      Mental Status: She is alert and oriented to person, place, and time. Mental status is at baseline.   Psychiatric:         Mood and Affect: Mood normal.         Behavior: Behavior normal.           ED Course & MDM   Diagnoses as of 06/21/25 2207   Fever, unspecified fever cause                 No data recorded     Fabricio Coma  Scale Score: 15 (06/19/25 2100 : Melani Babb RN)                           Medical Decision Making  Patient with fever of unknown origin.  CT imaging without obvious cause of the patient's fever she was started on broad-spectrum antibiotics Case was discussed with her surgery team and it was accepted for admission by hospitalist team.    Amount and/or Complexity of Data Reviewed  Labs: ordered.  Radiology: ordered.    Risk  Prescription drug management.  Decision regarding hospitalization.        Procedure  Procedures       [1]   Past Medical History:  Diagnosis Date    Anemia     Arthritis     Cataract     Chronic back pain     Colon polyp 2020    Fibroid     GERD (gastroesophageal reflux disease)     Hearing aid worn     Hiatal hernia     HL (hearing loss)     HLD (hyperlipidemia)     Hypertension     PONV (postoperative nausea and vomiting) 1975    RA (rheumatoid arthritis)     Sarcoidosis     Sarcoma (Multi)     right thigh    Sarcoma of right thigh (Multi)     Thyroid nodule 2014    Vision loss    [2]   Past Surgical History:  Procedure Laterality Date    CATARACT EXTRACTION      CHOLECYSTECTOMY  6/8/20    COLONOSCOPY      ESOPHAGOGASTRODUODENOSCOPY  4/8/16    EXPLORATORY LAPAROTOMY  05/23/2025    EXPLORATORY LAPAROSCOPY, SEGMENTAL RESECTION OF STOMACH; EGD; TAP BLOCK 76905- MT LAPS ABD PRTM&OMENTUM DX W/WO SPEC BR/WA SPX    HIP ARTHROPLASTY      HYSTERECTOMY  7/6/10    KNEE ARTHROSCOPY W/ MENISCAL REPAIR Left     LAPAROSCOPY REPAIR HIATAL HERNIA  05/21/2025    LAPAROSCOPIC ASSISTED HIATAL HERNIA REPAIR WITH FUNDOPLICATION / EGD / TAP BLOCK 24300 - MT LAPS RPR PARAESPHGL HRNA INCL FUNDPLSTY W/MESH    LEG SURGERY Right     sarcoma 2020 no bone involvement    UPPER GASTROINTESTINAL ENDOSCOPY     [3]   Family History  Problem Relation Name Age of Onset    Arthritis Mother Ce Kuhn     Hypertension Mother Ce Connellyarcadio     Miscarriages / Stillbirths Mother Ce Montenegrodave     Cancer Father Danilo Montenegrodave      Hearing loss Father Danilo Her     Hyperlipidemia Father Danilo Her     Hearing loss Brother Poncho Her     Breast cancer Paternal Grandmother Renetta her     Colon cancer Neg Hx      Celiac disease Neg Hx      Colon polyps Neg Hx      Irritable bowel syndrome Neg Hx      Liver disease Neg Hx      Blood clot Neg Hx     [4]   Social History  Tobacco Use    Smoking status: Never    Smokeless tobacco: Never   Vaping Use    Vaping status: Never Used   Substance Use Topics    Alcohol use: Never    Drug use: Never        Shayan Nicholas MD  06/21/25 2207       Shayan Nicholas MD  06/21/25 2208

## 2025-06-22 NOTE — PROGRESS NOTES
"Subjective   Beatriz Raymond \"Slava" is a 79 y.o. female who presents with Fever.    Continues to report multiple episodes of diarrhea.  No new complaints however states that she was able to tolerate some of her breakfast today.    Objective   Vitals:    06/22/25 1117   BP: 161/72   Pulse: 79   Resp:    Temp: 36.3 °C (97.3 °F)   SpO2: 92%      Physical Exam  Physical Exam:  Constitutional: frail, awake, alert, no acute distress  ENMT: mucous membranes dry, EOMI, conjunctivae clear  Head/Neck: normocephalic, atraumatic; supple, trachea midline  Respiratory/Thorax: rhonchi  Cardiovascular: RRR, no murmur  Gastrointestinal: diffuse tenderness, bowel sounds present  Extremities: palpable peripheral pulses, no edema or cyanosis  Neurological: AO x3, no focal deficits  Psychological: appropriate mood and behavior  Skin: warm and dry, PEG tube present    Assessment/Plan       Ginny Raymond is a 79 y.o. female with PMH of HTN, HLD, GERD, OA/RA, sarcoidosis recent laparoscopic hiatal hernia repair 5/21/2025 c/b peritonitis who presented to [facility] with lethargy, fevers and poor oral intake, admitted fever with leukocytosis.    Acute medical conditions:  #Gastroenteritis with diarrhea in setting of recent lap hiatal hernia repair 5/21/2025 c/b peritonitis  #Dysphagia with malnutrition in setting of above s/p PEG 6/18  #Bilat pleural effusions with atelectasis  #SIRS without sepsis  -PEG placed by GI 6/18, continue TF as tolerated, patient ok to eat PO as tolerated, patient is slowly tolerating more p.o. intake.  She has been resistant to taking her Imodium because she has had bad constipation after a surgery in the past however stated that she will be more amenable to taking it today.  -ID stopped all abx 6/19, continue to monitor for fevers  -Stool panel and C. difficile unremarkable, continued diarrhea despite Imodium.  General surgery recommending switching tube feeds, will defer to nutritionist for further recommendations " regarding this  -Diarrhea remains, stool panel and c diff unremarkable, imodium PRN  -Pulm following, aggressive bronchopulm hygiene, he is still requiring supplemental O2 however we are working to wean her down.  -PT/OT, [facility] 17    Chronic medical conditions:  #Hypertension  #Hyperlipidemia  #GERD  #OA, RA  #Sarcoidosis  - Leflunomide and sulfasalazine resumed on 6/22 as her infectious process is resolved  -Continue home medications: Simvastatin, Protonix, oxybutynin, metoprolol tartrate, amlodipine    DVT PPX: Lovenox  Diet: Regular diet, TF via PEG  IVF:  Code Status: FULL    This is a preliminary note written by the resident. Please wait for attending addendum for finalization of note and recommendations.    Dr. Claudio Juárez  Internal Medicine

## 2025-06-22 NOTE — CARE PLAN
The patient's goals for the shift include rest    The clinical goals for the shift include Pt will have decreased episodes of diarrhea throughout shift    Over the shift, the patient did not make progress toward the following goals. Barriers to progression include acute illness. Recommendations to address these barriers include communication.

## 2025-06-23 LAB
ANION GAP SERPL CALC-SCNC: 12 MMOL/L (ref 10–20)
BUN SERPL-MCNC: 14 MG/DL (ref 6–23)
CALCIUM SERPL-MCNC: 8.4 MG/DL (ref 8.6–10.3)
CHLORIDE SERPL-SCNC: 102 MMOL/L (ref 98–107)
CO2 SERPL-SCNC: 30 MMOL/L (ref 21–32)
CREAT SERPL-MCNC: 1.05 MG/DL (ref 0.5–1.05)
EGFRCR SERPLBLD CKD-EPI 2021: 54 ML/MIN/1.73M*2
ERYTHROCYTE [DISTWIDTH] IN BLOOD BY AUTOMATED COUNT: 16 % (ref 11.5–14.5)
GLUCOSE SERPL-MCNC: 97 MG/DL (ref 74–99)
HCT VFR BLD AUTO: 24.4 % (ref 36–46)
HGB BLD-MCNC: 7.2 G/DL (ref 12–16)
MAGNESIUM SERPL-MCNC: 1.88 MG/DL (ref 1.6–2.4)
MCH RBC QN AUTO: 27.2 PG (ref 26–34)
MCHC RBC AUTO-ENTMCNC: 29.5 G/DL (ref 32–36)
MCV RBC AUTO: 92 FL (ref 80–100)
NRBC BLD-RTO: 0 /100 WBCS (ref 0–0)
PLATELET # BLD AUTO: 564 X10*3/UL (ref 150–450)
POTASSIUM SERPL-SCNC: 3.7 MMOL/L (ref 3.5–5.3)
RBC # BLD AUTO: 2.65 X10*6/UL (ref 4–5.2)
SODIUM SERPL-SCNC: 140 MMOL/L (ref 136–145)
WBC # BLD AUTO: 10.3 X10*3/UL (ref 4.4–11.3)

## 2025-06-23 PROCEDURE — 99239 HOSP IP/OBS DSCHRG MGMT >30: CPT | Performed by: INTERNAL MEDICINE

## 2025-06-23 PROCEDURE — 2500000001 HC RX 250 WO HCPCS SELF ADMINISTERED DRUGS (ALT 637 FOR MEDICARE OP)

## 2025-06-23 PROCEDURE — 36415 COLL VENOUS BLD VENIPUNCTURE: CPT

## 2025-06-23 PROCEDURE — 97116 GAIT TRAINING THERAPY: CPT | Mod: GP,CQ

## 2025-06-23 PROCEDURE — 2500000002 HC RX 250 W HCPCS SELF ADMINISTERED DRUGS (ALT 637 FOR MEDICARE OP, ALT 636 FOR OP/ED)

## 2025-06-23 PROCEDURE — 2500000004 HC RX 250 GENERAL PHARMACY W/ HCPCS (ALT 636 FOR OP/ED)

## 2025-06-23 PROCEDURE — 94640 AIRWAY INHALATION TREATMENT: CPT

## 2025-06-23 PROCEDURE — 2500000002 HC RX 250 W HCPCS SELF ADMINISTERED DRUGS (ALT 637 FOR MEDICARE OP, ALT 636 FOR OP/ED): Performed by: INTERNAL MEDICINE

## 2025-06-23 PROCEDURE — 2500000004 HC RX 250 GENERAL PHARMACY W/ HCPCS (ALT 636 FOR OP/ED): Performed by: INTERNAL MEDICINE

## 2025-06-23 PROCEDURE — 1100000001 HC PRIVATE ROOM DAILY

## 2025-06-23 PROCEDURE — 97530 THERAPEUTIC ACTIVITIES: CPT | Mod: GP,CQ

## 2025-06-23 PROCEDURE — 85027 COMPLETE CBC AUTOMATED: CPT

## 2025-06-23 PROCEDURE — 97165 OT EVAL LOW COMPLEX 30 MIN: CPT | Mod: GO

## 2025-06-23 PROCEDURE — 97110 THERAPEUTIC EXERCISES: CPT | Mod: GP,CQ

## 2025-06-23 PROCEDURE — 80048 BASIC METABOLIC PNL TOTAL CA: CPT

## 2025-06-23 PROCEDURE — 2500000001 HC RX 250 WO HCPCS SELF ADMINISTERED DRUGS (ALT 637 FOR MEDICARE OP): Performed by: INTERNAL MEDICINE

## 2025-06-23 PROCEDURE — 83735 ASSAY OF MAGNESIUM: CPT

## 2025-06-23 RX ORDER — LOPERAMIDE HYDROCHLORIDE 2 MG/1
2 CAPSULE ORAL 4 TIMES DAILY PRN
Qty: 30 CAPSULE | Refills: 0 | Status: SHIPPED | OUTPATIENT
Start: 2025-06-23 | End: 2025-07-03

## 2025-06-23 RX ADMIN — HYDROXYCHLOROQUINE SULFATE 300 MG: 200 TABLET, FILM COATED ORAL at 09:22

## 2025-06-23 RX ADMIN — SULFASALAZINE 500 MG: 500 TABLET ORAL at 21:01

## 2025-06-23 RX ADMIN — SULFASALAZINE 500 MG: 500 TABLET ORAL at 12:25

## 2025-06-23 RX ADMIN — LEFLUNOMIDE 20 MG: 20 TABLET ORAL at 09:23

## 2025-06-23 RX ADMIN — PANTOPRAZOLE SODIUM 40 MG: 40 INJECTION, POWDER, FOR SOLUTION INTRAVENOUS at 09:25

## 2025-06-23 RX ADMIN — AMLODIPINE BESYLATE 5 MG: 5 TABLET ORAL at 09:21

## 2025-06-23 RX ADMIN — ENOXAPARIN SODIUM 40 MG: 100 INJECTION SUBCUTANEOUS at 23:16

## 2025-06-23 RX ADMIN — AMLODIPINE BESYLATE 5 MG: 5 TABLET ORAL at 21:01

## 2025-06-23 RX ADMIN — OXYBUTYNIN CHLORIDE 5 MG: 5 TABLET ORAL at 09:22

## 2025-06-23 RX ADMIN — SULFASALAZINE 500 MG: 500 TABLET ORAL at 17:45

## 2025-06-23 RX ADMIN — FUROSEMIDE 40 MG: 40 TABLET ORAL at 09:22

## 2025-06-23 RX ADMIN — METOPROLOL TARTRATE 12.5 MG: 25 TABLET, FILM COATED ORAL at 09:22

## 2025-06-23 RX ADMIN — OXYBUTYNIN CHLORIDE 5 MG: 5 TABLET ORAL at 21:01

## 2025-06-23 RX ADMIN — FAMOTIDINE 20 MG: 20 TABLET, FILM COATED ORAL at 09:22

## 2025-06-23 RX ADMIN — LOPERAMIDE HYDROCHLORIDE 2 MG: 2 CAPSULE ORAL at 21:38

## 2025-06-23 RX ADMIN — MAGNESIUM 64 MG (MAGNESIUM CHLORIDE) TABLET,DELAYED RELEASE 1 TABLET: at 09:24

## 2025-06-23 RX ADMIN — METOPROLOL TARTRATE 12.5 MG: 25 TABLET, FILM COATED ORAL at 21:01

## 2025-06-23 RX ADMIN — SIMVASTATIN 10 MG: 10 TABLET, FILM COATED ORAL at 09:24

## 2025-06-23 RX ADMIN — SULFASALAZINE 500 MG: 500 TABLET ORAL at 06:48

## 2025-06-23 ASSESSMENT — COGNITIVE AND FUNCTIONAL STATUS - GENERAL
DRESSING REGULAR UPPER BODY CLOTHING: A LITTLE
CLIMB 3 TO 5 STEPS WITH RAILING: A LOT
STANDING UP FROM CHAIR USING ARMS: A LITTLE
TOILETING: A LITTLE
PERSONAL GROOMING: A LITTLE
MOVING FROM LYING ON BACK TO SITTING ON SIDE OF FLAT BED WITH BEDRAILS: A LITTLE
DRESSING REGULAR LOWER BODY CLOTHING: A LITTLE
CLIMB 3 TO 5 STEPS WITH RAILING: A LOT
TOILETING: A LITTLE
DRESSING REGULAR UPPER BODY CLOTHING: A LITTLE
TURNING FROM BACK TO SIDE WHILE IN FLAT BAD: A LITTLE
MOBILITY SCORE: 17
DRESSING REGULAR LOWER BODY CLOTHING: A LITTLE
MOVING TO AND FROM BED TO CHAIR: A LITTLE
MOVING TO AND FROM BED TO CHAIR: A LITTLE
DAILY ACTIVITIY SCORE: 20
MOBILITY SCORE: 19
STANDING UP FROM CHAIR USING ARMS: A LITTLE
EATING MEALS: A LITTLE
WALKING IN HOSPITAL ROOM: A LITTLE
HELP NEEDED FOR BATHING: A LITTLE
DAILY ACTIVITIY SCORE: 18
HELP NEEDED FOR BATHING: A LITTLE
WALKING IN HOSPITAL ROOM: A LITTLE

## 2025-06-23 ASSESSMENT — PAIN SCALES - GENERAL
PAINLEVEL_OUTOF10: 3
PAINLEVEL_OUTOF10: 0 - NO PAIN
PAINLEVEL_OUTOF10: 3

## 2025-06-23 ASSESSMENT — PAIN - FUNCTIONAL ASSESSMENT
PAIN_FUNCTIONAL_ASSESSMENT: 0-10
PAIN_FUNCTIONAL_ASSESSMENT: 0-10

## 2025-06-23 ASSESSMENT — ACTIVITIES OF DAILY LIVING (ADL)
ADL_ASSISTANCE: INDEPENDENT
BATHING_ASSISTANCE: MINIMAL

## 2025-06-23 NOTE — CARE PLAN
The patient's goals for the shift include rest    The clinical goals for the shift include will tolerate bolus tube feedings as ordered.    Over the shift, the patient did not make progress toward the following goals. Barriers to progression include assist with adls needed..

## 2025-06-23 NOTE — DISCHARGE SUMMARY
Discharge Diagnosis  Gastroenteritis with diarrhea in setting of recent lap hiatal hernia repair 5/21/2025  Dysphagia with malnutrition s/p PEG 6/18  Bilateral pleural effusions with atelectasis    Issues Requiring Follow-Up  Gastroenteritis with diarrhea in setting of recent lap hiatal hernia repair 5/21/2025  Dysphagia with malnutrition s/p PEG 6/18  Bilateral pleural effusions with atelectasis, 2L NC supplemental O2    Discharge Meds     Medication List      START taking these medications     loperamide 2 mg capsule; Commonly known as: Imodium; Take 1 capsule (2   mg) by mouth 4 times a day as needed for diarrhea for up to 10 days.     CHANGE how you take these medications     omeprazole 40 mg DR capsule; Commonly known as: PriLOSEC; What changed:   Another medication with the same name was removed. Continue taking this   medication, and follow the directions you see here.     CONTINUE taking these medications     acetaminophen 650 mg ER tablet; Commonly known as: Tylenol 8 HOUR   amLODIPine 5 mg tablet; Commonly known as: Norvasc   CALCIUM 600 + D(3) ORAL   famotidine 40 mg tablet; Commonly known as: Pepcid   flaxseed oiL 1,000 mg capsule   hydroxychloroquine 200 mg tablet; Commonly known as: Plaquenil   leflunomide 20 mg tablet; Commonly known as: Arava   lidocaine 5 % patch; Commonly known as: Lidoderm; Place 1 patch over 12   hours on the skin once daily. Apply to painful area 12 hours per day,   remove for 12 hours.   metoprolol tartrate 25 mg tablet; Commonly known as: Lopressor; Take 0.5   tablets (12.5 mg) by mouth 2 times a day.   ondansetron ODT 4 mg disintegrating tablet; Commonly known as:   Zofran-ODT; Dissolve 1 tablet (4 mg) in the mouth every 8 hours if needed   for nausea or vomiting.   simvastatin 10 mg tablet; Commonly known as: Zocor   solifenacin 5 mg tablet; Commonly known as: VESIcare   sulfaSALAzine 500 mg tablet; Commonly known as: Azulfidine     STOP taking these medications      cholecalciferol 1.25 mg (50,000 units) capsule; Commonly known as:   Vitamin D-3   lactated Ringer's infusion   levoFLOXacin 750 mg tablet; Commonly known as: Levaquin   linezolid 100 mg/5 mL suspension; Commonly known as: Zyvox   Myrbetriq 25 mg tablet extended release 24 hr; Generic drug: mirabegron   nystatin 100,000 unit/mL suspension; Commonly known as: Mycostatin       Test Results Pending At Discharge  Pending Labs       No current pending labs.            Hospital Course  Ginny Raymond is a 79 y.o. female with PMH of HTN, HLD, GERD, OA/RA, sarcoidosis recent laparoscopic hiatal hernia repair 5/21/2025 c/b peritonitis who presented to [facility] with lethargy, fevers and poor oral intake.  Patient was admitted for fevers of unknown origin as well as dysphagia in the setting of recent laparoscopic hiatal repair.  Of note patient underwent elective laparoscopic hiatal hernia repair 5/21/2025 which was complicated by peritonitis with discharge 6/6/2025.  Since being discharged patient endorsed poor oral intake secondary to abdominal pain.  Initial workup of leukocytosis with fevers and abdominal pain included CT abdomen which showed nonspecific air-fluid level within the small bowel suggestive of possible gastroenteritis.  ID was consulted with a course of broad antibiotics completed. General surgery was consulted with no further surgical intervention required at this time.  GI was consulted due to patient's dysphagia and malnutrition.  PEG tube was placed 6/18/2025.  Patient tolerating tube feeds with p.o. intake.  Patient endorsed continuous intermittent diarrhea.  Stool pathogen and C. difficile negative.  In the setting of abdominal discomfort patient was found to have atelectasis bilaterally with supplemental oxygen required.  Patient evaluated by PT and OT with recommendations for skilled nursing facility.  Patient agreeable to SNF.  Patient to continue tube feeds via PEG and oral intake as tolerated upon  discharge.  Patient to follow-up with general surgery in the next 2 to 3 weeks regarding recent hospitalization.  Patient to follow-up with PCP in the next 1 to 2 weeks regarding recent hospitalization.    Pertinent Physical Exam At Time of Discharge  Physical Exam  Constitutional: frail, awake, alert, no acute distress  ENMT: mucous membranes dry, EOMI, conjunctivae clear  Head/Neck: normocephalic, atraumatic; supple, trachea midline  Respiratory/Thorax: patent airways, CTAB; no wheezes  Cardiovascular: RRR, no murmur  Gastrointestinal: diffuse tenderness, bowel sounds present  Extremities: palpable peripheral pulses, no edema or cyanosis  Neurological: AO x3, no focal deficits  Psychological: appropriate mood and behavior  Skin: warm and dry, PEG tube present      Outpatient Follow-Up  Future Appointments   Date Time Provider Department Center   6/26/2025 10:30 AM Alexander Barfield MD [id number] [place]   7/3/2025  8:30 AM Alexander Barfield MD [id number] [place]       Venkat Dawson DO, PhD  Internal Medicine PGY2

## 2025-06-23 NOTE — DISCHARGE INSTRUCTIONS
1.  Patient recommended to follow-up with general surgery, Alexander Barfield MD, regarding prior hiatal hernia repair and more recent malnutrition status post PEG tube.  2.  Patient discharged with tube feeds Jevity 1.5 via PEG tube at 200 cc/h 4 times a day with 100 cc water bolus before and after meals.  Patient is also able to eat by mouth as tolerated.  3.  Stool pathogen and C. difficile negative.  Patient continues to have watery stools.  Patient encouraged to take Imodium as needed.  4.  Patient to follow-up with her PCP in the next 1 to 2 weeks regarding recent hospitalization.  5.  In the setting of abdominal discomfort patient has atelectasis in her bilateral lower lungs.  Patient encouraged to use incentive spirometer throughout the day.  Patient is currently on 2 L nasal cannula, continue to wean as tolerated with goal SpO2 greater than 89%.

## 2025-06-23 NOTE — PROGRESS NOTES
"Occupational Therapy    Evaluation    Patient Name: Beatriz Raymond \"Slava"  MRN: [id number]  Today's Date: 6/23/2025  Time Calculation  Start Time: 0948  Stop Time: 1001  Time Calculation (min): 13 min  619/619-A    Assessment  IP OT Assessment  OT Assessment: Pt presents with decreased strength, endurance, standing tolerance/balance which impact her ADL and functional transfer status. Pt would benefit from skilled OT to address deficits and maximize IND/safety.  Prognosis: Good  Evaluation/Treatment Tolerance: Patient tolerated treatment well  End of Session Communication: Bedside nurse  End of Session Patient Position: Up in chair, Alarm on    Plan:  Treatment Interventions: ADL retraining, Functional transfer training, UE strengthening/ROM, Endurance training, Patient/family training  OT Frequency: 2 times per week  OT Discharge Recommendations: Low intensity level of continued care, Moderate intensity level of continued care (low vs mod)  OT Recommended Transfer Status: Assist of 1  OT - OK to Discharge: Yes (ok to d/c to next level of care once medically cleared)    Subjective     Current Problem:  1. Fever, unspecified fever cause        2. Dysphasia  Esophagogastroduodenoscopy (EGD) w PEG Tube Placement    Esophagogastroduodenoscopy (EGD) w PEG Tube Placement    Enteral feeding WITH diet order      3. Diarrhea, unspecified type  loperamide (Imodium) 2 mg capsule          General:  General  Reason for Referral: self care impairment; OT eval and treat; activity no restrictions  Referred By: Eunice  Past Medical History Relevant to Rehab: Complicated hernia repair, HTN, HLD, GERD, OA, RA, sarcoidosis, sarcoma right thigh, cholecystectomy, hysterectomy, left knee meniscal repair, arthroplasty  Family/Caregiver Present: No  Prior to Session Communication: Bedside nurse  Patient Position Received: Bed, 2 rail up, Alarm off, not on at start of session  General Comment: Pt. is a 80 y/o female adm. due to c/o fever and " lethargy s/p recent gastric surgery: lap hiatal hernia with mesh and nissen fundoplication 5/21/25.  Sx was compilcation with leak> exploratory lap and revision.  Also noted BRUCE and leukocystosis.  6/3 IR guided drainage of pelvic fluid.  Pt.also c/o lower abd pain and poor oral intake.  6/18/25 EGD with PEG tube placement. CT A/P showed alba pleural effusions; diverticulosis; right adrenal mass.    Precautions:  Medical Precautions: Fall precautions, Oxygen therapy device and L/min (PEG tube, O2)  Post-Surgical Precautions: Abdominal surgery precautions    Pain:  Pain Assessment  Pain Assessment: 0-10  0-10 (Numeric) Pain Score: 3  Pain Type: Surgical pain  Pain Location: Abdomen  Pain Orientation: Upper  Pain Interventions: Repositioned, Ambulation/increased activity  Response to Interventions: Content/relaxed    Objective     Cognition:  Overall Cognitive Status: Within Functional Limits  Orientation Level: Oriented X4             Home Living:  Type of Home: House  Lives With: Spouse  Home Adaptive Equipment: Walker rolling or standard, Cane  Home Layout: Two level, Laundry main level, Able to live on main level with bedroom/bathroom, Full bath main level  Home Access: Stairs to enter with rails  Entrance Stairs-Number of Steps: 3  Bathroom Shower/Tub: Walk-in shower  Bathroom Equipment: Grab bars in shower, Shower chair with back     Prior Function:  Level of [place]: Independent with ADLs and functional transfers, Needs assistance with homemaking  Receives Help From: Family ()  ADL Assistance: Independent  Homemaking Assistance: Needs assistance ( recently assisting more since surgery)  Ambulatory Assistance: Independent (Only using FWW since surgery 5/21/25, no AD needed prior)    ADL:  Eating Assistance:  (S/U)  Grooming Assistance: Stand by  Bathing Assistance: Minimal  UE Dressing Assistance: Stand by  LE Dressing Assistance: Minimal  Toileting Assistance with Device:  (Pt completed  "clothing management and hygiene in stance at [facility] with CGA <> SBA)    Activity Tolerance:  Endurance: Decreased tolerance for upright activites    Bed Mobility/Transfers:   Bed Mobility  Bed Mobility: Yes (Transition from supine to sit EOB wtih CGA and increased time. HOB elevated with use of bed rail.)  Transfers  Transfer: Yes (STS from bed and toilet using [facility] with CGA.)    Ambulation/Gait Training:  Functional Mobility  Functional Mobility Performed: Yes (Pt completed functional mobility throughout room and bathroom using [facility] with CGA <> SBA.)    Sitting Balance:  Static Sitting Balance  Static Sitting-Comment/Number of Minutes: Good  Dynamic Sitting Balance  Dynamic Sitting-Comments: Fair+    Standing Balance:  Static Standing Balance  Static Standing-Comment/Number of Minutes: Fair <> Fair +  Dynamic Standing Balance  Dynamic Standing-Comments: Fair <> Fair +    Sensation:  Sensation Comment: Reports numbness in L hand- 5th digit and partial numbness in 4th digit. Able to distinguish light touch with eyes closed but states the sensation feels \"different\" compared to R hand.    Strength:  Strength Comments: BUE strength grossly 4-/5    Coordination:  Movements are Fluid and Coordinated: Yes     Hand Function:  Hand Function  Gross Grasp: Functional    Extremities: RUE   RUE : Within Functional Limits and LUE   LUE: Within Functional Limits    Outcome Measures: [facility] Daily Activity  Putting on and taking off regular lower body clothing: A little  Bathing (including washing, rinsing, drying): A little  Putting on and taking off regular upper body clothing: A little  Toileting, which includes using toilet, bedpan or urinal: A little  Taking care of personal grooming such as brushing teeth: A little  Eating Meals: A little  Daily Activity - Total Score: 18                       EDUCATION:  Education  Individual(s) Educated: Patient  Education Provided: Fall precautons, Risk and benefits of OT discussed with patient " or other, POC discussed and agreed upon  Patient Response to Education: Patient/Caregiver Verbalized Understanding of Information  Education Documentation  Body Mechanics, taught by Lynne Hidalgo OT at 6/23/2025 11:42 AM.  Learner: Patient  Readiness: Acceptance  Method: Explanation, Demonstration  Response: Verbalizes Understanding, Demonstrated Understanding, Needs Reinforcement  Goals:   Encounter Problems       Encounter Problems (Active)       OT Goals       Pt will complete all functional transfers with MOD I using FWW. (Progressing)       Start:  06/23/25    Expected End:  07/07/25            Pt will complete functional mobility household distances with MOD I using FWW. (Progressing)       Start:  06/23/25    Expected End:  07/07/25            Pt will complete all toileting tasks with MOD I. (Progressing)       Start:  06/23/25    Expected End:  07/07/25            Pt will complete LB dressing with MOD I. (Progressing)       Start:  06/23/25    Expected End:  07/07/25            Pt will improve dynamic standing balance to good during functional tasks. (Progressing)       Start:  06/23/25    Expected End:  07/07/25

## 2025-06-23 NOTE — CONSULTS
"Pulmonary Critical Care note    Patient Name :Beatriz Raymond \"Slava"  Date of service : 06/23/25  MRN: [id number]  YOB: 1945      Interval History:    History of Present Illness:      79 y.o. female  on day  7 of admission presenting with Fever, unspecified fever cause.  S/p laparoscopic hiatal hernia repair, her course was implicated with BRUCE and persistent leukocytosis she was started on broad-spectrum IV antibiotics and recently had an IR drainage of pelvic fluid collection on 6/3/2025, patient was describing increased fatigue and recurrent nausea, her past medical history is extensive including hypertension/Anemia/GERD/rheumatoid arthritis/sarcoidosis     During her admission patient underwent CT scan of the chest which showed bilateral pleural effusion in addition to small bibasilar atelectatic changes, patient is currently on IV Zosyn .  Micafungin/vancomycin        Past Medical/Surgical History:    has a past medical history of Anemia, Arthritis, Cataract, Chronic back pain, Colon polyp (2020), Fibroid, GERD (gastroesophageal reflux disease), Hearing aid worn, Hiatal hernia, HL (hearing loss), HLD (hyperlipidemia), Hypertension, PONV (postoperative nausea and vomiting) (1975), RA (rheumatoid arthritis), Sarcoidosis, Sarcoma (Multi), Sarcoma of right thigh (Multi), Thyroid nodule (2014), and Vision loss.   has a past surgical history that includes Cholecystectomy (6/8/20); Hysterectomy (7/6/10); Esophagogastroduodenoscopy (4/8/16); Colonoscopy; Upper gastrointestinal endoscopy; Cataract extraction; Hip Arthroplasty; Knee arthroscopy w/ meniscal repair (Left); Leg Surgery (Right); Laparoscopy repair hiatal hernia (05/21/2025); and Exploratory laparotomy (05/23/2025).   reports that she has never smoked. She has never used smokeless tobacco. She reports that she does not drink alcohol and does not use drugs.  Family History:   No relevant family history has been documented for this " patient.    Allergies:     Codeine      Social history:   reports that she has never smoked. She has never used smokeless tobacco. She reports that she does not drink alcohol and does not use drugs.      Review of Systems:   General: denies weight gain, denies loss of appetite, fever, chills, night sweats.  HEENT: denies headaches, dizziness, head trauma, visual changes, eye pain, tinnitus, nosebleeds, hoarseness or throat pain    Respiratory: denies chest pain, dyspnea, cough and hemoptysis  Cardiovascular: denies orthopnea, paroxysmal nocturnal dyspnea, leg swelling, and previous heart attack.    Gastrointestinal: denies pain, nausea vomiting, diarrhea, constipation, melena or bleeding.  Genitourinary: denies hematuria, frequency, urgency or dysuria  Neurology: denies syncope, seizures, paralysis, paraesthesia   Endocrine: denies polyuria, polydipsia, skin or hair changes, and heat or cold intolerance  Musculoskeletal: denies joint pain, swelling, arthritis or myalgia  Hematologic: denies bleeding, adenopathy and easy bruising  Skin: denies rashes and skin discoloration  Psychiatry: denies depression    Physical Exam:   Vital Signs:   Vitals:    06/23/25 1156   BP: 136/63   Pulse: 82   Resp:    Temp: 36.8 °C (98.2 °F)   SpO2: 96%     Vitals:    06/18/25 1134   Weight: 73.9 kg (163 lb)         Input/Output:    Intake/Output Summary (Last 24 hours) at 6/23/2025 1900  Last data filed at 6/23/2025 1215  Gross per 24 hour   Intake 600 ml   Output --   Net 600 ml       Oxygen requirements:    Ventilator Information:  FiO2 (%):  [28 %] 28 %  Oxygen Therapy/Pulse Ox  Medical Gas Therapy: Supplemental oxygen  Medical Gas Delivery Method: Nasal cannula  FiO2 (%): 28 %  SpO2: 96 %  Patient Activity During SpO2 Measurement: At rest  Temp: 36.8 °C (98.2 °F)        General appearance: Not in pain or distress, in no respiratory distress    HEENT: Atraumatic/normocephalic, EOMI, KINJAL, pharynx clear, moist mucosa, redness of the  uvula appreciated,   Neck: Supple, no jugular venous distension, lymphadenopathy, thyromegaly or carotid bruits  Chest: rhonchi  Cardiovascular: Normal S1, S2, regular rate and rhythm, no murmur, rub or gallop  Abdomen: Normal sounds present, soft, lax with no tenderness, no hepatosplenomegaly, and no masses  Extremities: No edema. Pulses are equally present.   Skin: intact, no rashes   Neurologic: Alert and oriented x 3, No focal deficit         Current Medications:   Scheduled medications  Scheduled Medications[1]  Continuous medications  Continuous Medications[2]  PRN medications  PRN Medications[3]      Investigations:  Labs, radiological imaging and cardiac work up were personally reviewed    Results from last 7 days   Lab Units 06/23/25  0636 06/22/25  0810 06/21/25  0608   SODIUM mmol/L 140 141 141   POTASSIUM mmol/L 3.7 3.6 3.6   CHLORIDE mmol/L 102 102 103   CO2 mmol/L 30 30 28   BUN mg/dL 14 13 16   CREATININE mg/dL 1.05 1.06* 1.01   GLUCOSE mg/dL 97 154* 104*   CALCIUM mg/dL 8.4* 8.7 8.1*     Results from last 7 days   Lab Units 06/23/25  0636   WBC AUTO x10*3/uL 10.3   HEMOGLOBIN g/dL 7.2*   HEMATOCRIT % 24.4*   PLATELETS AUTO x10*3/uL 564*         Imaging  No results found.      Cardiology, Vascular, and Other Imaging  Esophagogastroduodenoscopy (EGD) w PEG Tube Placement  Result Date: 6/18/2025  Table formatting from the original result was not included. Impression The esophagus appeared normal. Multiple fundic gland polyps in the cardia, fundus of the stomach and body of the stomach The duodenum appeared normal. PEG tube placed in the body of the stomach Dilated 1 step in the GE junction with balloon dilator - (step 1) dilated to 15 mm. Dilation caused no change Findings The esophagus appeared normal. Multiple fundic gland polyps in the cardia, fundus of the stomach and body of the stomach. There were innumerable polyps in the cardia, fundus and body of the stomach suspected to be either related to  recent surgery or fundic gland polyps The duodenum appeared normal. A PEG tube measuring 20 Fr was successfully placed in the body of the stomach using a deformable internal bolster via the pull technique after the site was identified via transillumination, visualized indentation and needle passed through abdominal wall; scope reinserted and tube rotated freely to confirm placement; distance from external bolster to external end of tube: 4.5 cm Dilated 1 step in the GE junction with 15 mm long balloon dilator - (step 1) dilated to 15 mm for 30 s. Dilation caused no change Recommendation Start using PEG tube for water and medications immediately Can start tube feeds in 4 hours Watch for complications GI to continue following  Indication Dysphasia Post-Op Diagnosis None Staff Staff Role Jeff Quan MD Proceduralist Medications See Anesthesia Record. Preprocedure A history and physical has been performed, and patient medication allergies have been reviewed. The patient's tolerance of previous anesthesia has been reviewed. The risks and benefits of the procedure and the sedation options and risks were discussed with the patient. All questions were answered and informed consent obtained. Details of the Procedure The patient underwent monitored anesthesia care, which was administered by an anesthesia professional. The patient's blood pressure, ECG, ETCO2, heart rate, level of consciousness, oxygen and respirations were monitored throughout the procedure. The scope was introduced through the mouth and advanced to the second part of the duodenum. Retroflexion was performed in the cardia. Prior to the procedure, the patient's H. Pylori status was unknown. The patient experienced no blood loss. The procedure was not difficult. The patient tolerated the procedure well. There were no apparent adverse events. Events Procedure Events Event Event Time ENDO SCOPE IN TIME 6/18/2025 12:13 PM ENDO SCOPE OUT TIME 6/18/2025 12:31 PM  "Specimens No specimens collected Procedure Location [facility] [address] [facility] [id number] [phone] Referring Provider Susy Luis PA-C Procedure Provider Jeff Quan MD         Assessment  Beatriz Raymond \"Ginny\" IS 79 y.o. female  on day  7 of admission presenting with Fever, unspecified fever cause. Actively being treated for the  following medical Problems:    Acute on chronic respiratory failure  Small bilateral pleural effusion  Small bilateral infiltrate pattern consistent with atelectasis  Improved intra-abdominal abscess/fluid collection s/p full course of IV antibiotics  Sarcoidosis currently dormant but patient is on chronic immunosuppressive therapy with leflunomide/Plaquenil    Recommendation:  Tube feeding   Diarrhea still ongoing but negative C. difficile   SNF pending   Oxygen for saturation of 89 to 94%  Incentive spirometry  Bronchodilators therapy as needed  PT/OT  DVT prophylaxis  Minimize benzodiazepine and narcotics  Encourage ambulation  Head evaluation and aspiration precautions.        Miesha Shane MD  Pulmonary critical care consultant  06/23/25  7:00 PM       STAFF PHYSICIAN NOTE OF PERSONAL INVOLVEMENT IN CARE  As the attending physician, I certify that I personally reviewed the patient's history and personally examined the patient to confirm the physical findings described above, and that I reviewed the relevant imaging studies and available reports.  I also discussed the differential diagnosis and all of the proposed management plans with the patient and individuals accompanying the patient to this visit.  They had the opportunity to ask questions about the proposed management plans and to have those questions answered.           [1] amLODIPine, 5 mg, g-tube, BID  enoxaparin, 40 mg, subcutaneous, q24h  famotidine, 20 mg, g-tube, Daily  hydroxychloroquine, 300 mg, g-tube, Daily  leflunomide, 20 mg, g-tube, Daily  magnesium chloride, 64 mg of " elemental magnesium, oral, Daily  metoprolol tartrate, 12.5 mg, g-tube, BID  oxyBUTYnin, 5 mg, g-tube, BID  pantoprazole, 40 mg, intravenous, Daily  simvastatin, 10 mg, g-tube, Daily  sulfaSALAzine, 500 mg, g-tube, 4x daily     [2]    [3] PRN medications: acetaminophen, acetylcysteine, hydrALAZINE, loperamide, ondansetron

## 2025-06-23 NOTE — PROGRESS NOTES
"Physical Therapy    Physical Therapy Treatment    Patient Name: Beatriz Raymond \"Slava"  MRN: [id number]  Department: [facility]  Room: [address] [place] [address]  Today's Date: 6/23/2025  Time Calculation  Start Time: 1355  Stop Time: 1436  Time Calculation (min): 41 min         Assessment/Plan   PT Assessment  End of Session Communication: Bedside nurse  End of Session Patient Position: Up in chair, Alarm on (Pt seated in recliner, call bell in reach.)     PT Plan  Treatment/Interventions: Bed mobility, Transfer training, Gait training, Balance training, Strengthening, Therapeutic exercise, Therapeutic activity, Stair training, Endurance training, Home exercise program  PT Plan: Ongoing PT  PT Frequency: 4 times per week  PT Discharge Recommendations: Low intensity level of continued care  Equipment Recommended upon Discharge: Wheeled walker  PT Recommended Transfer Status: Assist x1  PT - OK to Discharge: Yes (when medically cleared by physician)    PT Visit Info:  PT Received On: 06/23/25     General Visit Information:   General  Prior to Session Communication: Bedside nurse  Patient Position Received: Up in chair, Alarm off, not on at start of session  General Comment:  (Pt resting in recliner with BLE elevated upon arrival, reports feeling very tired today; pleasant and agreeable to participate in PT session. Pt required time to use BSC.)    Subjective   Precautions:  Precautions  Medical Precautions: Fall precautions, Oxygen therapy device and L/min  Post-Surgical Precautions: Abdominal surgery precautions  Precautions Comment: Peg tube, 2LO2 via NC            Objective   Pain:  Pain Assessment  Pain Assessment: 0-10  0-10 (Numeric) Pain Score: 3  Pain Type: Surgical pain  Pain Location: Abdomen  Pain Orientation: Upper  Pain Interventions: Repositioned, Distraction              Treatments:  Therapeutic Exercise  Therapeutic Exercise Performed: Yes (Pt performed seated BLE therex consisting of: heel/toe raises, marching, LAQ, HS, GS, hip " ADD isometrics with pillow 2x10 reps each.)    Ambulation/Gait Training  Ambulation/Gait Training Performed: Yes (Pt able to amb 100'x2 with FWW and CGA plus O2 follow; demos slow, steady chaya with partial step through/step through gait pattern.  Denies dizziness and no LOB noted.)  Transfers  Transfer: Yes (Pt performed stand pivot transfer recliner>BSC and sit<>stand  from multiple surfaces with FWW and SBA.)    Outcome Measures:  [facility] Basic Mobility  Turning from your back to your side while in a flat bed without using bedrails: A little  Moving from lying on your back to sitting on the side of a flat bed without using bedrails: A little  Moving to and from bed to chair (including a wheelchair): A little  Standing up from a chair using your arms (e.g. wheelchair or bedside chair): A little  To walk in hospital room: A little  Climbing 3-5 steps with railing: A lot  Basic Mobility - Total Score: 17    Education Documentation  Handouts, taught by Alverto Steward PTA at 6/23/2025  3:07 PM.  Learner: Patient  Readiness: Acceptance  Method: Explanation  Response: Verbalizes Understanding    Precautions, taught by Alverto Steward PTA at 6/23/2025  3:07 PM.  Learner: Patient  Readiness: Acceptance  Method: Explanation  Response: Verbalizes Understanding    Body Mechanics, taught by Alverto Steward PTA at 6/23/2025  3:07 PM.  Learner: Patient  Readiness: Acceptance  Method: Explanation  Response: Verbalizes Understanding    Home Exercise Program, taught by Alverto Steward PTA at 6/23/2025  3:07 PM.  Learner: Patient  Readiness: Acceptance  Method: Explanation  Response: Verbalizes Understanding    Mobility Training, taught by Alverto Steward PTA at 6/23/2025  3:07 PM.  Learner: Patient  Readiness: Acceptance  Method: Explanation  Response: Verbalizes Understanding    Education Comments  No comments found.        OP EDUCATION:       Encounter Problems       Encounter Problems (Active)       PT Problem       Patient  will perform bed mobility supine<>sit independently (Progressing)       Start:  06/19/25    Expected End:  07/03/25            Patient will transfer sit<>stand independently (Progressing)       Start:  06/19/25    Expected End:  07/03/25            Patient will ambulate >=150 ft with WW independently (Progressing)       Start:  06/19/25    Expected End:  07/03/25            Patient will negotiate 3 steps with handrail and lrd prn,  with supervision (Progressing)       Start:  06/19/25    Expected End:  07/03/25            Pt.will perform BLE therapeutic exercises x 20reps x 2 sets for increased functional strength/endurance  (Progressing)       Start:  06/19/25    Expected End:  07/03/25               Pain - Adult

## 2025-06-23 NOTE — PROGRESS NOTES
"Nutrition Follow Up Assessment:   Nutrition Assessment         Patient is a 79 y.o. female presenting with fever.       Nutrition History:  Energy Intake: Good > 75 %  Pain affecting nutrition status: N/A  Food and Nutrient History: TF bolus amount decreased to 200 ml 4x/day. Has restarted PO diet  with meal intakes of 25-50% recently. Pt having diarrhea that she feels is worse since starting TF but has improved somewhat with immodium. Unclear if diarrhea worsened with TF because of the increase in overall nutrition (had minimal PO intake prior to TF) or due to an intolerance to the formula or due to medical reasons (had multiple GI surgeries recently). Has planned discharge - can try switching formula once pt gets to nursing/rehab facility which can be monitored by that facility's dietitian. Alternatively, if pt's PO intake improves, TF amount/frequency can be decreased and ultimately discontinued once pt can maintain adequate nutrition through diet alone.       Anthropometrics:  Height: 162.6 cm (5' 4\")   Weight: 73.9 kg (163 lb)   BMI (Calculated): 27.97  IBW/kg (Dietitian Calculated): 54.5 kg                         Weight History:     Weight Change %:  Weight History / % Weight Change: No new wt to assess    Nutrition Focused Physical Exam Findings:    Subcutaneous Fat Loss:   Defer Subcutaneous Fat Loss Assessment: Defer all  Muscle Wasting:  Defer Muscle Wasting Assessment: Defer all  Edema:  Edema: +1 trace  Edema Location: BLE per nursing assessment  Physical Findings:  Skin: Positive (abdomen surgical wound per nursing assessment)  Digestive System Findings: Diarrhea    Nutrition Significant Labs:    Reviewed   Nutrition Specific Medications:  Reviewed     I/O:   Last BM Date: 06/23/25; Stool Appearance: Loose (06/23/25 0800)    Dietary Orders (From admission, onward)       Start     Ordered    06/20/25 1415  Enteral feeding WITH diet order Diet type: Regular; Tube feeding formula: Jevity 1.5; 200; 4x/day; " 100; Water  Continuous        Comments: Water flushes before and after each meal. Ok to eat PO as tolerated.   Question Answer Comment   Diet type Regular    Tube feeding formula: Jevity 1.5    Feeding route: PEG (percutaneous endoscopic gastric)    Tube feeding bolus (mL): 200    Tube feeding bolus frequency: 4x/day    Tube feeding flush (mL): 100    Flush type: Water        06/20/25 1416    06/17/25 0840  May Participate in Room Service  ( ROOM SERVICE MAY PARTICIPATE)  Once        Question:  .  Answer:  Yes    06/17/25 0839                     Estimated Needs:      Method for Estimating Needs: 3067-8871 kcal/d (25-30 kcal/kg IBW)     Method for Estimating 24 Hour Protein Needs: 55 g/d (1 g/kg IBW)     Method for Estimating 24 Hour Fluid Needs: 9778-8248 ml/d (1 ml/kcal or per MD)  Patient on Order Fluid Restriction: No        Nutrition Diagnosis   Malnutrition Diagnosis  Patient has Malnutrition Diagnosis:  (SMOOTH)    Nutrition Diagnosis  Patient has Nutrition Diagnosis: Yes  Diagnosis Status (1): Active  Nutrition Diagnosis 1: Inadequate oral intake  Related to (1): decreased appetite, GI symptoms s/p hernia repair  As Evidenced by (1): 25% intake x 1 meal during admission, reports of poor PO intake PTA  Additional Assessment Information (1): PO intakes continue to be <50%, EN now meeting >75% of needs       Nutrition Interventions/Recommendations   Nutrition prescription for oral nutrition, Nutrition prescription for enteral nutrition    Nutrition Recommendations:  Individualized Nutrition Prescription Provided for : Continue TF Jevity 1.5 bolus feeds of 200 ml 4x/day. 100 ml flushes after each bolus, or per MD. Continue to adjust TF based on PO intake. Consider peptide based formula (vital 1.5 or equivalent) if TF formula thought to be the cause of worsened diarrhea.    Nutrition Interventions/Goals:   Meals and Snacks: General healthful diet  Goal: Consumes 3 meals per day  Enteral Intake: Management of  composition of enteral nutrition, Management of delivery rate of enteral nutrition, Management of concentration of enteral nutrition, Management of flushing of feeding tube, Management of volume of enteral nutrition  Goal: TF Jevity 1.5 bolus feeds of 200 ml 4x/day to provide 1200 kcal, 51 g protein, and 608 ml free H2O. 100 ml flushes after each bolus, or per MD.      Education Documentation  Nutrition Related Education, taught by Cleo Burgos RD at 6/23/2025  2:42 PM.  Learner: Family, Patient  Readiness: Acceptance  Method: Explanation  Response: Verbalizes Understanding  Comment: Addressed pt's questions regarding nutrition needs. Explained that TF likely will need to continue until PO intake improves enough to meet nutrition needs independently. Discussed wt as indicator of nutrition status. Discussed TF formula and diarrhea.              Nutrition Monitoring and Evaluation   Estimated Energy Intake: Energy intake greater or equal to 75% of estimated energy needs  Intake / Amount of food: Consumes at least 75% or more of meals/snacks/supplements, Meets > 75% estimated energy needs  Enteral and Parenteral Nutrition Intake Determination: Enteral nutrition intake - Tolerate TF at goal rate, Enteral nutrition intake - Prevent refeeding, Enteral nutrition intake - To meet > 75% estimated energy needs    Body Weight: Body weight - Maintain stable weight         Digestive System Finding: Diarrhea  Criteria: Improvement in GI function/symptoms    Goal Status: Some progress toward goal(s)    Time Spent (min): 45 minutes

## 2025-06-23 NOTE — PROGRESS NOTES
06/23/25 1027   Discharge Planning   Living Arrangements Spouse/significant other   Support Systems Spouse/significant other;Family members   Type of Residence Private residence   Expected Discharge Disposition SNF   Does the patient need discharge transport arranged? Yes   RoundTrip coordination needed? Yes     I revisited patient's bedside to follow up on SNF choices.  She reviewed list with her  and verbalized preference for [facility]  and  [facility]; referral sent; patient will not need precert.  Care Coordination team following for assistance with discharge planning.  Blair DANGELO TCC  1539:  Both facilities of choice are unable to accept.  I updated patient and verbalized preference for [facility]; referral sent.

## 2025-06-24 VITALS
WEIGHT: 163 LBS | SYSTOLIC BLOOD PRESSURE: 148 MMHG | HEART RATE: 90 BPM | RESPIRATION RATE: 16 BRPM | HEIGHT: 64 IN | DIASTOLIC BLOOD PRESSURE: 65 MMHG | TEMPERATURE: 97.9 F | OXYGEN SATURATION: 97 % | BODY MASS INDEX: 27.83 KG/M2

## 2025-06-24 LAB
ANION GAP SERPL CALC-SCNC: 12 MMOL/L (ref 10–20)
BUN SERPL-MCNC: 16 MG/DL (ref 6–23)
CALCIUM SERPL-MCNC: 8.7 MG/DL (ref 8.6–10.3)
CHLORIDE SERPL-SCNC: 101 MMOL/L (ref 98–107)
CO2 SERPL-SCNC: 33 MMOL/L (ref 21–32)
CREAT SERPL-MCNC: 1.11 MG/DL (ref 0.5–1.05)
EGFRCR SERPLBLD CKD-EPI 2021: 51 ML/MIN/1.73M*2
ERYTHROCYTE [DISTWIDTH] IN BLOOD BY AUTOMATED COUNT: 16.2 % (ref 11.5–14.5)
GLUCOSE SERPL-MCNC: 103 MG/DL (ref 74–99)
HCT VFR BLD AUTO: 25.2 % (ref 36–46)
HGB BLD-MCNC: 7.5 G/DL (ref 12–16)
MCH RBC QN AUTO: 27.1 PG (ref 26–34)
MCHC RBC AUTO-ENTMCNC: 29.8 G/DL (ref 32–36)
MCV RBC AUTO: 91 FL (ref 80–100)
NRBC BLD-RTO: 0 /100 WBCS (ref 0–0)
PLATELET # BLD AUTO: 557 X10*3/UL (ref 150–450)
POTASSIUM SERPL-SCNC: 3.7 MMOL/L (ref 3.5–5.3)
RBC # BLD AUTO: 2.77 X10*6/UL (ref 4–5.2)
SODIUM SERPL-SCNC: 142 MMOL/L (ref 136–145)
WBC # BLD AUTO: 8.8 X10*3/UL (ref 4.4–11.3)

## 2025-06-24 PROCEDURE — 2500000001 HC RX 250 WO HCPCS SELF ADMINISTERED DRUGS (ALT 637 FOR MEDICARE OP): Performed by: INTERNAL MEDICINE

## 2025-06-24 PROCEDURE — 2500000002 HC RX 250 W HCPCS SELF ADMINISTERED DRUGS (ALT 637 FOR MEDICARE OP, ALT 636 FOR OP/ED)

## 2025-06-24 PROCEDURE — 2500000002 HC RX 250 W HCPCS SELF ADMINISTERED DRUGS (ALT 637 FOR MEDICARE OP, ALT 636 FOR OP/ED): Performed by: INTERNAL MEDICINE

## 2025-06-24 PROCEDURE — 85027 COMPLETE CBC AUTOMATED: CPT

## 2025-06-24 PROCEDURE — 2500000001 HC RX 250 WO HCPCS SELF ADMINISTERED DRUGS (ALT 637 FOR MEDICARE OP)

## 2025-06-24 PROCEDURE — 2500000004 HC RX 250 GENERAL PHARMACY W/ HCPCS (ALT 636 FOR OP/ED)

## 2025-06-24 PROCEDURE — 94640 AIRWAY INHALATION TREATMENT: CPT

## 2025-06-24 PROCEDURE — 36415 COLL VENOUS BLD VENIPUNCTURE: CPT

## 2025-06-24 PROCEDURE — 99233 SBSQ HOSP IP/OBS HIGH 50: CPT

## 2025-06-24 PROCEDURE — 80048 BASIC METABOLIC PNL TOTAL CA: CPT

## 2025-06-24 RX ORDER — PANTOPRAZOLE SODIUM 40 MG/1
40 FOR SUSPENSION ORAL
Qty: 30 PACKET | Refills: 0 | Status: SHIPPED | OUTPATIENT
Start: 2025-06-25 | End: 2025-06-24 | Stop reason: HOSPADM

## 2025-06-24 RX ORDER — PANTOPRAZOLE SODIUM 40 MG/1
40 FOR SUSPENSION ORAL
Status: DISCONTINUED | OUTPATIENT
Start: 2025-06-25 | End: 2025-06-24 | Stop reason: RX

## 2025-06-24 RX ORDER — ESOMEPRAZOLE MAGNESIUM 40 MG/1
40 GRANULE, DELAYED RELEASE ORAL
Status: DISCONTINUED | OUTPATIENT
Start: 2025-06-25 | End: 2025-06-24 | Stop reason: HOSPADM

## 2025-06-24 RX ADMIN — HYDROXYCHLOROQUINE SULFATE 300 MG: 200 TABLET, FILM COATED ORAL at 08:58

## 2025-06-24 RX ADMIN — METOPROLOL TARTRATE 12.5 MG: 25 TABLET, FILM COATED ORAL at 08:59

## 2025-06-24 RX ADMIN — MAGNESIUM 64 MG (MAGNESIUM CHLORIDE) TABLET,DELAYED RELEASE 1 TABLET: at 08:59

## 2025-06-24 RX ADMIN — SULFASALAZINE 500 MG: 500 TABLET ORAL at 06:18

## 2025-06-24 RX ADMIN — FAMOTIDINE 20 MG: 20 TABLET, FILM COATED ORAL at 08:59

## 2025-06-24 RX ADMIN — SULFASALAZINE 500 MG: 500 TABLET ORAL at 12:25

## 2025-06-24 RX ADMIN — AMLODIPINE BESYLATE 5 MG: 5 TABLET ORAL at 08:58

## 2025-06-24 RX ADMIN — PANTOPRAZOLE SODIUM 40 MG: 40 INJECTION, POWDER, FOR SOLUTION INTRAVENOUS at 09:00

## 2025-06-24 RX ADMIN — SIMVASTATIN 10 MG: 10 TABLET, FILM COATED ORAL at 08:59

## 2025-06-24 RX ADMIN — OXYBUTYNIN CHLORIDE 5 MG: 5 TABLET ORAL at 08:58

## 2025-06-24 RX ADMIN — LEFLUNOMIDE 20 MG: 20 TABLET ORAL at 09:00

## 2025-06-24 ASSESSMENT — COGNITIVE AND FUNCTIONAL STATUS - GENERAL
WALKING IN HOSPITAL ROOM: A LITTLE
HELP NEEDED FOR BATHING: A LITTLE
CLIMB 3 TO 5 STEPS WITH RAILING: A LOT
MOVING TO AND FROM BED TO CHAIR: A LITTLE
DRESSING REGULAR UPPER BODY CLOTHING: A LITTLE
STANDING UP FROM CHAIR USING ARMS: A LITTLE
DAILY ACTIVITIY SCORE: 20
DRESSING REGULAR LOWER BODY CLOTHING: A LITTLE
MOBILITY SCORE: 19
TOILETING: A LITTLE

## 2025-06-24 ASSESSMENT — PAIN SCALES - GENERAL: PAINLEVEL_OUTOF10: 0 - NO PAIN

## 2025-06-24 NOTE — CARE PLAN
The patient's goals for the shift include rest    The clinical goals for the shift include comfort and safety      Problem: Pain - Adult  Goal: Verbalizes/displays adequate comfort level or baseline comfort level  Outcome: Progressing  Flowsheets (Taken 6/23/2025 2354)  Verbalizes/displays adequate comfort level or baseline comfort level: Encourage patient to monitor pain and request assistance     Problem: Discharge Planning  Goal: Discharge to home or other facility with appropriate resources  Outcome: Progressing  Flowsheets (Taken 6/23/2025 2354)  Discharge to home or other facility with appropriate resources: Identify barriers to discharge with patient and caregiver     Problem: Chronic Conditions and Co-morbidities  Goal: Patient's chronic conditions and co-morbidity symptoms are monitored and maintained or improved  Outcome: Progressing  Flowsheets (Taken 6/23/2025 2354)  Care Plan - Patient's Chronic Conditions and Co-Morbidity Symptoms are Monitored and Maintained or Improved: Monitor and assess patient's chronic conditions and comorbid symptoms for stability, deterioration, or improvement     Problem: Nutrition  Goal: Nutrient intake appropriate for maintaining nutritional needs  Outcome: Progressing     Problem: Skin  Goal: Decreased wound size/increased tissue granulation at next dressing change  Outcome: Progressing  Flowsheets (Taken 6/23/2025 2354)  Decreased wound size/increased tissue granulation at next dressing change: Promote sleep for wound healing  Goal: Participates in plan/prevention/treatment measures  Outcome: Progressing  Flowsheets (Taken 6/23/2025 2354)  Participates in plan/prevention/treatment measures: Elevate heels  Goal: Prevent/manage excess moisture  Outcome: Progressing  Flowsheets (Taken 6/23/2025 2354)  Prevent/manage excess moisture: Monitor for/manage infection if present  Goal: Prevent/minimize sheer/friction injuries  Outcome: Progressing  Flowsheets (Taken 6/23/2025  1474)  Prevent/minimize sheer/friction injuries: HOB 30 degrees or less  Goal: Promote/optimize nutrition  Outcome: Progressing  Flowsheets (Taken 6/23/2025 2354)  Promote/optimize nutrition: Monitor/record intake including meals  Goal: Promote skin healing  Outcome: Progressing  Flowsheets (Taken 6/23/2025 2354)  Promote skin healing: Assess skin/pad under line(s)/device(s)     Problem: Pain  Goal: Takes deep breaths with improved pain control throughout the shift  Outcome: Progressing  Goal: Turns in bed with improved pain control throughout the shift  Outcome: Progressing  Goal: Walks with improved pain control throughout the shift  Outcome: Progressing  Goal: Performs ADL's with improved pain control throughout shift  Outcome: Progressing  Goal: Participates in PT with improved pain control throughout the shift  Outcome: Progressing  Goal: Free from opioid side effects throughout the shift  Outcome: Progressing  Goal: Free from acute confusion related to pain meds throughout the shift  Outcome: Progressing     Problem: Fall/Injury  Goal: Be free from injury by end of the shift  Outcome: Progressing  Goal: Verbalize understanding of personal risk factors for fall in the hospital  Outcome: Progressing  Goal: Verbalize understanding of risk factor reduction measures to prevent injury from fall in the home  Outcome: Progressing  Goal: Use assistive devices by end of the shift  Outcome: Progressing  Goal: Pace activities to prevent fatigue by end of the shift  Outcome: Progressing

## 2025-06-24 NOTE — PROGRESS NOTES
"Subjective   Beatriz Raymond \"Slava" is a 79 y.o. female who presents with Fever.    Sitting up in chair this morning. No acute issues overnight. Plan for discharge to SNF today.    Objective   Vitals:    06/24/25 1142   BP: 148/65   Pulse: 90   Resp:    Temp: 36.6 °C (97.9 °F)   SpO2: 97%      Physical Exam  Physical Exam:  Constitutional: frail, awake, alert, no acute distress  ENMT: mucous membranes dry, EOMI, conjunctivae clear  Head/Neck: normocephalic, atraumatic; supple, trachea midline  Respiratory/Thorax: rhonchi  Cardiovascular: RRR, no murmur  Gastrointestinal: diffuse tenderness, bowel sounds present  Extremities: palpable peripheral pulses, no edema or cyanosis  Neurological: AO x3, no focal deficits  Psychological: appropriate mood and behavior  Skin: warm and dry, PEG tube present    Assessment/Plan       Ginny Raymond is a 79 y.o. female with PMH of HTN, HLD, GERD, OA/RA, sarcoidosis recent laparoscopic hiatal hernia repair 5/21/2025 c/b peritonitis who presented to [facility] with lethargy, fevers and poor oral intake, admitted fever with leukocytosis.    Acute medical conditions:  #Gastroenteritis with diarrhea in setting of recent lap hiatal hernia repair 5/21/2025 c/b peritonitis  #Dysphagia with malnutrition in setting of above s/p PEG 6/18  #Bilat pleural effusions with atelectasis  #SIRS without sepsis  -Patient recommended to follow-up with general surgery, Alexander Barfield MD, regarding prior hiatal hernia repair and more recent malnutrition status post PEG tube.  -Patient discharged with tube feeds Jevity 1.5 via PEG tube at 200 cc/h 4 times a day with 100 cc water bolus before and after meals.  Patient is also able to eat by mouth as tolerated.  -Stool pathogen and C. difficile negative.  Patient continues to have watery stools.  Patient encouraged to take Imodium as needed.  -Patient to follow-up with her PCP in the next 1 to 2 weeks regarding recent hospitalization.  -In the setting of abdominal " discomfort patient has atelectasis in her bilateral lower lungs.  Patient encouraged to use incentive spirometer throughout the day.  Patient is currently on 2 L nasal cannula, continue to wean as tolerated with goal SpO2 greater than 89%.  -DC to SNF today    Chronic medical conditions:  #Hypertension  #Hyperlipidemia  #GERD  #OA, RA  #Sarcoidosis  - Leflunomide and sulfasalazine resumed on 6/22 as her infectious process is resolved  -Continue home medications: Simvastatin, Protonix, oxybutynin, metoprolol tartrate, amlodipine    DVT PPX: Lovenox  Diet: Regular diet, TF via PEG  IVF:  Code Status: FULL    This is a preliminary note written by the resident. Please wait for attending addendum for finalization of note and recommendations.    Venkat Dawson DO, PhD  Internal Medicine PGY2

## 2025-06-24 NOTE — PROGRESS NOTES
06/24/25 1223   Discharge Planning   Living Arrangements Spouse/significant other   Support Systems Spouse/significant other;Family members   Expected Discharge Disposition SNF   Does the patient need discharge transport arranged? Yes   RoundTrip coordination needed? Yes   Has discharge transport been arranged? Yes   What day is the transport expected? 06/24/25   What time is the transport expected? 1445   Intensity of Service   Intensity of Service >30 min     Patient has discharge orders and transportation scheduled at 1445 to take her to [facility], patient and her  at the bedside are aware.  Blair DANGELO TCC

## 2025-06-24 NOTE — CARE PLAN
The patient's goals for the shift include rest    The clinical goals for the shift include comfort and safety    Over the shift, the patient did not make progress toward the following goals. Barriers to progression include assist with adls as needed.

## 2025-06-25 NOTE — PROGRESS NOTES
GENERAL SURGERY CLINIC  FOLLOW UP NOTE      Name: Beatriz Raymond  MRN: [id number]      Index Surgery  Surgeon: Dr. Barfield at [facility]       Date of Surgery: 5/21/25  Surgical Procedure: LAPAROSCOPIC ASSISTED HIATAL HERNIA REPAIR WITH FUNDOPLICATION / EGD / TAP BLOCK  27410 - DE LAPS RPR PARAESPHGL HRNA INCL FUNDPLSTY W/MESH    Date of Surgery: 5/23/25  Surgical Procedure: Other: EXPLORATORY LAPAROSCOPY, SEGMENTAL RESECTION OF STOMACH; EGD; TAP BLOCK  10064- DE LAPS ABD PRTM&OMENTUM DX W/WO SPEC BR/WA SPX     Date of Surgery: 6/18/25  Surgical Procedure: EGD with PEG Tube Placement  Surgeon: Dr. Jeff Quan       HPI: 79 year old female presenting today for a 1 week follow up.  Patient is s/p elective laparoscopic hiatal hernia repair with mesh and Nissen fundoplication on 5/21/2025. On post op day 3 she was taken back to the OR an underwent a diagnostic laparoscopy, takedown of her fundoplication which revealed a leak in the proximal part of her plication, partial gastrectomy, and redo fundoplication on 5/23/2025. Patient has been having a difficult time with PO intake.  Was seen in the infusion center on 6/11/25 for LR infusion.  At last visit patient was counseled to increase proteins to at least 90gm per day and use visual cues such as setting out cups to drink in 1 hour, or setting timers to remind her to drink fluids to maintain hydration.  Patient readmitted to [facility] 6/16/25 with fever of unspecified cause. On 6/18/25 patient was taken for an EGD with PEG tube placement.     Virtual or Telephone Consent    An interactive audio and video telecommunication system which permits real time communications between the patient (at the originating site) and provider (at the distant site) was utilized to provide this telehealth service.   Verbal consent was requested and obtained from Beatriz Raymond on this date, 06/27/25 for a telehealth visit and the patient's location was confirmed at the time of the visit.      HPI  6/12/25: 79-year old female presenting today for a follow up 3wks s/p elective laparoscopic hiatal hernia repair with mesh and Nissen fundoplication on 5/21/2025. On post op day 3 she was taken back to the OR an underwent a diagnostic laparoscopy, takedown of her fundoplication which revealed a leak in the proximal part of her plication, partial gastrectomy, and redo fundoplication on 5/23/2025.     Hospital Course 5/21-6/6:   79-year-old female who underwent an elective laparoscopic hiatal hernia repair with mesh and Nissen fundoplication on 5/21/2025.  On postop day 1 in the postop day 2, she developed tachycardia and altered mentation, and diagnostic workup revealed intra-abdominal free fluid and concern for perforated viscus.  She underwent a diagnostic laparoscopy, takedown of her fundoplication which revealed a leak in the proximal part of her plication, partial gastrectomy, and redo fundoplication on 5/23/2025.  Postoperative course was complicated by an acute kidney injury and prolonged hospitalization given leukocytosis and multiple bouts of diarrhea.  She tested negative for C. difficile multiple times.  She did have persistent leukocytosis, peaking at roughly 22,000.  She was on broad-spectrum antibiotics and ultimately underwent an IR-guided drainage of the pelvic fluid collection on 6/3/2025.  Since then, her leukocytosis has down trended and her antibiotics were tailored as per factious disease recommendations.  On day of discharge, her leukocytosis was 12,900.  Her hemoglobin was noted to be 6.8 today, and she has been anemic for several days with a hemoglobin around 7.  She has no clinical signs of bleeding, no tachycardia, no hypotension.  Discussed at length with the patient and her family at bedside about the risk and benefits of transfusion versus observation.  At this time, we will elect to forego any transfusion of blood products.  The patient will be discharged home today, and she will get a  CBC and a CMP before following up in clinic next week.    Hospital Course 6/16-6/24/25  79-year-old female well-known to the surgical team and who had a complicated laparoscopic hiatal hernia repair requiring a takeback for leak. She returned to the hospital with poor oral intake, bilateral pleural effusions and leukocytosis. Her leukocytosis has resolved. Her current symptoms remain diarrhea and her dry mouth. From a surgical standpoint patient is allowed to have an oral diet in addition to tube feeds. No surgical intervention is indicated at this time     Concerns related to:  Nausea/Vomiting, Reflux: Some nausea  Abdominal Pain: Mild abdominal pain  Diarrhea/Constipation frequent bowel movements    REVIEW OF SYSTEMS:    Negative for except some nausea, abdominal pain and frequent bowel movements, lack of appetite    PHYSICAL EXAM:  Deferred due to video visit       ASSESSMENT & PLAN 6/12/25:  79 y.o. female presenting for follow up visit s/p hiatal hernia repair with fundoplication complicated by perforation of wrap followed by abdominal washout and drainage with intra-abdominal fluid collections requiring antibiotics was discharged home in stable condition.    Continues to have poor p.o. intake due to mild nausea and mostly lack of appetite and fullness.    Repeat labs are fairly satisfactory,  No results found for this or any previous visit (from the past 24 hours).         Plan:    We discussed the options of increasing p.o. intake versus a gastrostomy tube placed versus Dobbhoff tube.  Is not critical however if she does not improve her p.o. intake she would require some sort of nutritional support.  After discussion she wants to wait for another week at least which I think is okay as long as she does not deteriorate further.    Encouraged her to complete her antibiotics course as advised by infectious disease.    Continue PPI and home medications.  Patient to follow-up next Friday virtually, sooner as  needed.      Assessment and Plan:   79 year old female presenting today for a hospital discharge follow up.  Patient is s/p elective laparoscopic hiatal hernia repair with mesh and Nissen fundoplication on 5/21/2025. On post op day 3 she was taken back to the OR an underwent a diagnostic laparoscopy, takedown of her fundoplication which revealed a leak in the proximal part of her plication, partial gastrectomy, and redo fundoplication on 5/23/2025. Patient has been having a difficult time with PO intake.  Was seen in the infusion center on 6/11/25 for LR infusion.  At last visit patient was counseled to increase proteins to at least 90gm per day and use visual cues such as setting out cups to drink in 1 hour, or setting timers to remind her to drink fluids to maintain hydration.  Patient readmitted to [facility] 6/16/25 with fever of unspecified cause. PEG tube placed 6/18/25.   Talk to patient and  present during the visit.  She is in rehab place and overall doing well.  She was still wearing nasal cannula and she stated that she does not feel shortness of breath and this morning after physical therapy she was discussing with the therapist and therapist also agreed that she probably can get rid of it soon.  She does not have any fever or chills etc.  She is getting tube feeds they tried continuous feeds but that gave her diarrhea all night and she could not sleep so she is back to bolus feeds 200 cc every hour for 6 hours with water in between.  She is tolerating p.o. intake as well however she occasionally gets dry heaves without vomiting or nausea.  It goes away on its own.  She feels like she may be choking on mucus sometimes.    Advised her to do some warm water or tea etc. before eating so that the esophageal mucus secretions are cleared up and avoid drinking after eating for half hour or so.  She is getting speech and swallow evaluation today and they will decide texture of the food suitable  for her.  She will continue tube feeds and with eventual goal of getting rid of tube feeds in 4 to 6 weeks.  Rehab plan per the providers at the rehab facility.  I provided her the office number to reach out to us in the daytime if there is any questions and answering service after hours.  They voiced understanding and agreement.  Follow-up 2 weeks.

## 2025-06-26 ENCOUNTER — APPOINTMENT (OUTPATIENT)
Dept: SURGERY | Facility: CLINIC | Age: 80
End: 2025-06-26
Payer: MEDICARE

## 2025-06-26 NOTE — DOCUMENTATION CLARIFICATION NOTE
"    PATIENT:               EVELYNE AGUIRRE  ACCT #:                  [id number]  MRN:                       [id number]  :                       1945  ADMIT DATE:       2025 5:51 PM  DISCH DATE:        2025 5:00 PM  RESPONDING PROVIDER #:        [id number]          PROVIDER RESPONSE TEXT:    Gastroenteritis with diarrhea is related to or is a complication of the recent hernia surgery    CDI QUERY TEXT:    Clarification      Instruction:    Based on your assessment of the patient and the clinical information, please provide the requested documentation by clicking on the appropriate radio button and enter any additional information if prompted.    Question: Please further clarify if a relationship exists between the Gastroenteritis with diarrhea and the recent hernia surgery    When answering this query, please exercise your independent professional judgment. The fact that a question is being asked, does not imply that any particular answer is desired or expected.    The patient's clinical indicators include:  Clinical Information: 78 y/o with stated fevers and abdominal pain    Clinical Indicators:    25 ED Triage note Butt \" For the last couple days the patient has had increasing fatigue, last night a fever of 101, difficulty swallowing, mild dysuria and increasing abdominal pain. \"    25 Consult note Janelle \"She states in surgery she has been having a change in bowel habits. She is been having intermittent diarrhea which has been going on for the past few days. She notes she had 4 episodes of diarrhea this morning which she describes as loose to liquid and black color. She does note she has been having black stools intermittently since surgery. Prior to surgery she was moving her bowels daily normal caliber and color and send she has been alternating between daily formed bowel movements and diarrhea. She also endorses having a lot of bloating.\"    6/3/25 DC summary \"Gastroenteritis with diarrhea " "in setting of recent lap hiatal hernia repair 5/21/2025\"    6/16/25 CT chest and abd  \" Air-fluid levels within small bowel loops, which are nonspecific.  This may be secondary to either a mild postoperative ileus, or  underlying gastroenteritis.\"      Treatment:  -General Surgery consult  -Gastroenterology consult  -lactated Ringer's bolus 2,217 mL x 1  -MagDelay 535 mg (64 mg elemental) EC tablet 1 tablet daily x 5  -Zofran x 1  -Azulfidine tablet 500 mg 4 times a day x 8  -Imodium capsule 2 mg 4 times a day PRN x 4  -Imaging      Risk Factors:  lap hiatal hernia repair 5/21/2025 with complications  Options provided:  -- Gastroenteritis with diarrhea and mild post op ileus is related to or is a complication of the recent hernia surgery  -- Gastroenteritis with diarrhea is related to or is a complication of the recent hernia surgery  -- Gastroenteritis with diarrhea and mild post op ileus is not related to the recent hernia surgery  -- Gastroenteritis with diarrhea  is not  related to the recent hernia surgery and further no ileus is present on this admission  -- Other - I will add my own diagnosis  -- Refer to Clinical Documentation Reviewer    Query created by: Victoriano Figueroa on 6/26/2025 12:58 PM      Electronically signed by:  DANK SOSA DO 6/26/2025 1:10 PM          "

## 2025-06-27 ENCOUNTER — OFFICE VISIT (OUTPATIENT)
Dept: SURGERY | Facility: CLINIC | Age: 80
End: 2025-06-27
Payer: MEDICARE

## 2025-06-27 DIAGNOSIS — Z93.1 GASTROSTOMY TUBE IN PLACE: ICD-10-CM

## 2025-06-27 DIAGNOSIS — E44.1 MILD PROTEIN-CALORIE MALNUTRITION (MULTI): ICD-10-CM

## 2025-06-27 DIAGNOSIS — K21.9 GASTROESOPHAGEAL REFLUX DISEASE WITHOUT ESOPHAGITIS: Primary | ICD-10-CM

## 2025-06-27 PROCEDURE — 1111F DSCHRG MED/CURRENT MED MERGE: CPT | Performed by: SURGERY

## 2025-06-27 PROCEDURE — 99211 OFF/OP EST MAY X REQ PHY/QHP: CPT | Performed by: SURGERY

## 2025-06-27 PROCEDURE — 1036F TOBACCO NON-USER: CPT | Performed by: SURGERY

## 2025-07-03 ENCOUNTER — APPOINTMENT (OUTPATIENT)
Dept: SURGERY | Facility: CLINIC | Age: 80
End: 2025-07-03
Payer: MEDICARE

## 2025-07-07 NOTE — PROGRESS NOTES
GENERAL SURGERY CLINIC  FOLLOW UP NOTE      Name: Beatriz Raymond  MRN: [id number]      Index Surgery  Surgeon: Dr. Barfield at [facility]       Date of Surgery: 5/21/25  Surgical Procedure: LAPAROSCOPIC ASSISTED HIATAL HERNIA REPAIR WITH FUNDOPLICATION / EGD / TAP BLOCK  00143 - NC LAPS RPR PARAESPHGL HRNA INCL FUNDPLSTY W/MESH    Date of Surgery: 5/23/25  Surgical Procedure: Other: EXPLORATORY LAPAROSCOPY, SEGMENTAL RESECTION OF STOMACH; EGD; TAP BLOCK  64271- NC LAPS ABD PRTM&OMENTUM DX W/WO SPEC BR/WA SPX     Virtual or Telephone Consent    An interactive audio and video telecommunication system which permits real time communications between the patient (at the originating site) and provider (at the distant site) was utilized to provide this telehealth service.     Verbal consent was requested and obtained from Beatriz Raymond on this date, 07/09/25 for a telehealth visit and the patient's location was confirmed at the time of the visit.    HPI: 79 year old female presenting today for a 2 week follow up from hospitalization.  Patient is s/p elective laparoscopic hiatal hernia repair with mesh and Nissen fundoplication on 5/21/2025. On post op day 3 she was taken back to the OR an underwent a diagnostic laparoscopy, takedown of her fundoplication which revealed a leak in the proximal part of her plication, partial gastrectomy, and redo fundoplication on 5/23/2025. Patient has been having a difficult time with PO intake.  Was seen in the infusion center on 6/11/25 for LR infusion.  At last visit patient was counseled to increase proteins to at least 90gm per day and use visual cues such as setting out cups to drink in 1 hour, or setting timers to remind her to drink fluids to maintain hydration.  Patient readmitted to [facility] 6/16/25 with fever of unspecified cause.      HPI 6/12/25: 79-year old female presenting today for a follow up 3wks s/p elective laparoscopic hiatal hernia repair with mesh and Nissen fundoplication  on 5/21/2025. On post op day 3 she was taken back to the OR an underwent a diagnostic laparoscopy, takedown of her fundoplication which revealed a leak in the proximal part of her plication, partial gastrectomy, and redo fundoplication on 5/23/2025.     Hospital Course 5/21-6/6:   79-year-old female who underwent an elective laparoscopic hiatal hernia repair with mesh and Nissen fundoplication on 5/21/2025.  On postop day 1 in the postop day 2, she developed tachycardia and altered mentation, and diagnostic workup revealed intra-abdominal free fluid and concern for perforated viscus.  She underwent a diagnostic laparoscopy, takedown of her fundoplication which revealed a leak in the proximal part of her plication, partial gastrectomy, and redo fundoplication on 5/23/2025.  Postoperative course was complicated by an acute kidney injury and prolonged hospitalization given leukocytosis and multiple bouts of diarrhea.  She tested negative for C. difficile multiple times.  She did have persistent leukocytosis, peaking at roughly 22,000.  She was on broad-spectrum antibiotics and ultimately underwent an IR-guided drainage of the pelvic fluid collection on 6/3/2025.  Since then, her leukocytosis has down trended and her antibiotics were tailored as per factious disease recommendations.  On day of discharge, her leukocytosis was 12,900.  Her hemoglobin was noted to be 6.8 today, and she has been anemic for several days with a hemoglobin around 7.  She has no clinical signs of bleeding, no tachycardia, no hypotension.  Discussed at length with the patient and her family at bedside about the risk and benefits of transfusion versus observation.  At this time, we will elect to forego any transfusion of blood products.  The patient will be discharged home today, and she will get a CBC and a CMP before following up in clinic next week.    Hospital Course 6/16-6/25  Ginny Raymond is a 79 y.o. female with PMH of HTN, HLD, GERD, OA/RA,  sarcoidosis recent laparoscopic hiatal hernia repair 5/21/2025 c/b peritonitis who presented to [facility] with lethargy, fevers and poor oral intake.  Patient was admitted for fevers of unknown origin as well as dysphagia in the setting of recent laparoscopic hiatal repair.  Of note patient underwent elective laparoscopic hiatal hernia repair 5/21/2025 which was complicated by peritonitis with discharge 6/6/2025.  Since being discharged patient endorsed poor oral intake secondary to abdominal pain.  Initial workup of leukocytosis with fevers and abdominal pain included CT abdomen which showed nonspecific air-fluid level within the small bowel suggestive of possible gastroenteritis.  ID was consulted with a course of broad antibiotics completed. General surgery was consulted with no further surgical intervention required at this time.  GI was consulted due to patient's dysphagia and malnutrition.  PEG tube was placed 6/18/2025.  Patient tolerating tube feeds with p.o. intake.  Patient endorsed continuous intermittent diarrhea.  Stool pathogen and C. difficile negative.  In the setting of abdominal discomfort patient was found to have atelectasis bilaterally with supplemental oxygen required.  Patient evaluated by PT and OT with recommendations for skilled nursing facility.  Patient agreeable to SNF.  Patient to continue tube feeds via PEG and oral intake as tolerated upon discharge.  Patient to follow-up with general surgery in the next 2 to 3 weeks regarding recent hospitalization.  Patient to follow-up with PCP in the next 1 to 2 weeks regarding recent hospitalization.     Concerns related to:  Nausea/Vomiting, Reflux: Some nausea  Abdominal Pain: Mild abdominal pain  Diarrhea/Constipation frequent bowel movements    Virtual visit 7/9/2025: Had a virtual visit with the patient and her  from the rehab facility. Patient is doing better, eating better and her physical condition is improving. She inquired about  taking Imodium and simethicone PRN which we agreed to. She asked about the dietitian making changes to her feeding regimen with a plan to have nocturnal tube feeds and 3 meals per day. She has no other issues.     REVIEW OF SYSTEMS:  Negative for except some nausea, abdominal pain and frequent bowel movements, lack of appetite    PHYSICAL EXAM:  Deferred due to virtual nature of visit    ASSESSMENT & PLAN 6/12/25:  79 y.o. female presenting for follow up visit s/p hiatal hernia repair with fundoplication complicated by perforation of wrap followed by abdominal washout and drainage with intra-abdominal fluid collections requiring antibiotics was discharged home in stable condition.    Continues to have poor p.o. intake due to mild nausea and mostly lack of appetite and fullness.    Repeat labs are fairly satisfactory,  No results found for this or any previous visit (from the past 24 hours).         Plan:    We discussed the options of increasing p.o. intake versus a gastrostomy tube placed versus Dobbhoff tube.  Is not critical however if she does not improve her p.o. intake she would require some sort of nutritional support.  After discussion she wants to wait for another week at least which I think is okay as long as she does not deteriorate further.    Encouraged her to complete her antibiotics course as advised by infectious disease.    Continue PPI and home medications.  Patient to follow-up next Friday virtually, sooner as needed.      Assessment and Plan:   79 year old patient s/p elective laparoscopic hiatal hernia repair with mesh and Nissen fundoplication on 5/21/2025. On post op day 3 she was taken back to the OR an underwent a diagnostic laparoscopy, takedown of her fundoplication which revealed a leak in the proximal part of her plication, partial gastrectomy, and redo fundoplication on 5/23/2025. Patient has been having a difficult time with PO intake.  Was seen in the [facility] on 6/11/25 for LR  infusion.  At last visit patient was counseled to increase proteins to at least 90gm per day and use visual cues such as setting out cups to drink in 1 hour, or setting timers to remind her to drink fluids to maintain hydration.  Patient readmitted to [facility] 6/16/25 with fever of unspecified cause.    Virtual visit today from rehab. Patient is doing well. Her physical conditioning and oral intake are improving. She has occasional diarrhea and flatulence and is allowed to have Imodium and simethicone. She asked about removing the feeding tube and was informed that the tube would be removed when the determination is made that she no longer needs it. Tube can be removed in the office. Would  recommend nutritional labs after changes and weaning off tube feeds to determine if tube feeds can be stopped and feeding tube can be removed.     Plan:  - Follow up in 2 weeks virtually with nutrition labs

## 2025-07-09 ENCOUNTER — TELEMEDICINE (OUTPATIENT)
Dept: SURGERY | Facility: CLINIC | Age: 80
End: 2025-07-09
Payer: MEDICARE

## 2025-07-09 DIAGNOSIS — E44.1 MILD PROTEIN-CALORIE MALNUTRITION (MULTI): ICD-10-CM

## 2025-07-09 DIAGNOSIS — K21.9 GASTROESOPHAGEAL REFLUX DISEASE WITHOUT ESOPHAGITIS: Primary | ICD-10-CM

## 2025-07-09 PROCEDURE — 99211 OFF/OP EST MAY X REQ PHY/QHP: CPT | Performed by: SURGERY

## 2025-07-09 PROCEDURE — 1160F RVW MEDS BY RX/DR IN RCRD: CPT | Performed by: SURGERY

## 2025-07-09 PROCEDURE — 1111F DSCHRG MED/CURRENT MED MERGE: CPT | Performed by: SURGERY

## 2025-07-09 PROCEDURE — 1159F MED LIST DOCD IN RCRD: CPT | Performed by: SURGERY

## 2025-07-29 NOTE — PROGRESS NOTES
GENERAL SURGERY CLINIC  FOLLOW UP NOTE      Name: Beatriz Raymond  MRN: [id number]      Index Surgery  Surgeon: Dr. Barfield at [facility]       Date of Surgery: 5/21/25  Surgical Procedure: LAPAROSCOPIC ASSISTED HIATAL HERNIA REPAIR WITH FUNDOPLICATION / EGD / TAP BLOCK  94433 - AK LAPS RPR PARAESPHGL HRNA INCL FUNDPLSTY W/MESH    Date of Surgery: 5/23/25  Surgical Procedure: Other: EXPLORATORY LAPAROSCOPY, SEGMENTAL RESECTION OF STOMACH; EGD; TAP BLOCK  76147- AK LAPS ABD PRTM&OMENTUM DX W/WO SPEC BR/WA SPX     Virtual or Telephone Consent    An interactive audio and video telecommunication system which permits real time communications between the patient (at the originating site) and provider (at the distant site) was utilized to provide this telehealth service.   Verbal consent was requested and obtained from Beatriz Raymond on this date, 07/31/25 for a telehealth visit and the patient's location was confirmed at the time of the visit.      HPI: 79 year old female presenting today for a 2 week follow up.  Patient is s/p elective laparoscopic hiatal hernia repair with mesh and Nissen fundoplication on 5/21/2025. On post op day 3 she was taken back to the OR an underwent a diagnostic laparoscopy, takedown of her fundoplication which revealed a leak in the proximal part of her plication, partial gastrectomy, and redo fundoplication on 5/23/2025. Patient has been having a difficult time with PO intake.  Was seen in the infusion center on 6/11/25 for LR infusion.  At last visit patient was counseled to increase proteins to at least 90gm per day and use visual cues such as setting out cups to drink in 1 hour, or setting timers to remind her to drink fluids to maintain hydration.  Patient readmitted to [facility] 6/16/25 with fever of unspecified cause.      HPI 6/12/25: 79-year old female presenting today for a follow up 3wks s/p elective laparoscopic hiatal hernia repair with mesh and Nissen fundoplication on 5/21/2025. On  post op day 3 she was taken back to the OR an underwent a diagnostic laparoscopy, takedown of her fundoplication which revealed a leak in the proximal part of her plication, partial gastrectomy, and redo fundoplication on 5/23/2025.     Hospital Course 5/21-6/6:   79-year-old female who underwent an elective laparoscopic hiatal hernia repair with mesh and Nissen fundoplication on 5/21/2025.  On postop day 1 in the postop day 2, she developed tachycardia and altered mentation, and diagnostic workup revealed intra-abdominal free fluid and concern for perforated viscus.  She underwent a diagnostic laparoscopy, takedown of her fundoplication which revealed a leak in the proximal part of her plication, partial gastrectomy, and redo fundoplication on 5/23/2025.  Postoperative course was complicated by an acute kidney injury and prolonged hospitalization given leukocytosis and multiple bouts of diarrhea.  She tested negative for C. difficile multiple times.  She did have persistent leukocytosis, peaking at roughly 22,000.  She was on broad-spectrum antibiotics and ultimately underwent an IR-guided drainage of the pelvic fluid collection on 6/3/2025.  Since then, her leukocytosis has down trended and her antibiotics were tailored as per factious disease recommendations.  On day of discharge, her leukocytosis was 12,900.  Her hemoglobin was noted to be 6.8 today, and she has been anemic for several days with a hemoglobin around 7.  She has no clinical signs of bleeding, no tachycardia, no hypotension.  Discussed at length with the patient and her family at bedside about the risk and benefits of transfusion versus observation.  At this time, we will elect to forego any transfusion of blood products.  The patient will be discharged home today, and she will get a CBC and a CMP before following up in clinic next week.    Hospital Course 6/16-6/25  Ginny Raymond is a 79 y.o. female with PMH of HTN, HLD, GERD, OA/RA, sarcoidosis  recent laparoscopic hiatal hernia repair 5/21/2025 c/b peritonitis who presented to [facility] with lethargy, fevers and poor oral intake.  Patient was admitted for fevers of unknown origin as well as dysphagia in the setting of recent laparoscopic hiatal repair.  Of note patient underwent elective laparoscopic hiatal hernia repair 5/21/2025 which was complicated by peritonitis with discharge 6/6/2025.  Since being discharged patient endorsed poor oral intake secondary to abdominal pain.  Initial workup of leukocytosis with fevers and abdominal pain included CT abdomen which showed nonspecific air-fluid level within the small bowel suggestive of possible gastroenteritis.  ID was consulted with a course of broad antibiotics completed. General surgery was consulted with no further surgical intervention required at this time.  GI was consulted due to patient's dysphagia and malnutrition.  PEG tube was placed 6/18/2025.  Patient tolerating tube feeds with p.o. intake.  Patient endorsed continuous intermittent diarrhea.  Stool pathogen and C. difficile negative.  In the setting of abdominal discomfort patient was found to have atelectasis bilaterally with supplemental oxygen required.  Patient evaluated by PT and OT with recommendations for skilled nursing facility.  Patient agreeable to SNF.  Patient to continue tube feeds via PEG and oral intake as tolerated upon discharge.  Patient to follow-up with general surgery in the next 2 to 3 weeks regarding recent hospitalization.  Patient to follow-up with PCP in the next 1 to 2 weeks regarding recent hospitalization.     Concerns related to:  Nausea/Vomiting, Reflux: Some nausea  Abdominal Pain: Mild abdominal pain  Diarrhea/Constipation frequent bowel movements    Virtual visit 7/9/2025: Had a virtual visit with the patient and her  from the rehab facility. Patient is doing better, eating better and her physical condition is improving. She inquired about taking  Imodium and simethicone PRN which we agreed to. She asked about the dietitian making changes to her feeding regimen with a plan to have nocturnal tube feeds and 3 meals per day. She has no other issues.     REVIEW OF SYSTEMS:  Negative for except some nausea, abdominal pain and frequent bowel movements, lack of appetite    PHYSICAL EXAM:  Deferred due to virtual nature of visit    ASSESSMENT & PLAN 6/12/25:  79 y.o. female presenting for follow up visit s/p hiatal hernia repair with fundoplication complicated by perforation of wrap followed by abdominal washout and drainage with intra-abdominal fluid collections requiring antibiotics was discharged home in stable condition.    Continues to have poor p.o. intake due to mild nausea and mostly lack of appetite and fullness.    Repeat labs are fairly satisfactory,  No results found for this or any previous visit (from the past 24 hours).         Plan:    We discussed the options of increasing p.o. intake versus a gastrostomy tube placed versus Dobbhoff tube.  Is not critical however if she does not improve her p.o. intake she would require some sort of nutritional support.  After discussion she wants to wait for another week at least which I think is okay as long as she does not deteriorate further.    Encouraged her to complete her antibiotics course as advised by infectious disease.    Continue PPI and home medications.  Patient to follow-up next Friday virtually, sooner as needed.      Assessment and Plan 7/9/25:   79 year old patient s/p elective laparoscopic hiatal hernia repair with mesh and Nissen fundoplication on 5/21/2025. On post op day 3 she was taken back to the OR an underwent a diagnostic laparoscopy, takedown of her fundoplication which revealed a leak in the proximal part of her plication, partial gastrectomy, and redo fundoplication on 5/23/2025. Patient has been having a difficult time with PO intake.  Was seen in the infusion center on 6/11/25 for LR  infusion.  At last visit patient was counseled to increase proteins to at least 90gm per day and use visual cues such as setting out cups to drink in 1 hour, or setting timers to remind her to drink fluids to maintain hydration.  Patient readmitted to [facility] 6/16/25 with fever of unspecified cause.    Virtual visit today from rehab. Patient is doing well. Her physical conditioning and oral intake are improving. She has occasional diarrhea and flatulence and is allowed to have Imodium and simethicone. She asked about removing the feeding tube and was informed that the tube would be removed when the determination is made that she no longer needs it. Tube can be removed in the office. Would  recommend nutritional labs after changes and weaning off tube feeds to determine if tube feeds can be stopped and feeding tube can be removed.     Plan:  - Follow up in 2 weeks virtually with nutrition labs    Visit conducted by Dr. Khalil, agree with A/P.   --------------------------------------------------------------------------------------    Assessment and Plan:    Patient is doing well, is being discharged from rehab place tomorrow.  She has been evaluated by the dietitian at the rehab facility and they have advised her to stop tube feeds.  She is taking 2 ensures by mouth which gave her runny bowel movement sometimes.  She is tolerating solid foods 3 times a day.  She is taking omeprazole and Pepcid right now.  Occasionally she gets a spasm like feeling in the stomach it is self-limited and goes pretty quickly she describes it as hiccups for the lack of better description however it is not debilitating or disabling.  She denies any vomiting or other abdominal pain symptoms.  She still has a G-tube in place.    Recommendations:    Advised the patient to continue solid foods, okay to use protein shakes to supplement protein intake.  Agree with the recommendations of stopping tube feeds.  She however is allowed to  use the tube for additional protein intake if needed until the tube is removed.    Advised her to stop Pepcid and do omeprazole once a day only, in 2 weeks cut it down to every other day and hopefully we can taper it off and stop it by the time she follows up with us again.  She can go back on daily PPI if the heartburn symptoms recur.    She will follow-up with us in 4 weeks for tube removal if she does not need to use it anymore.

## 2025-07-31 ENCOUNTER — TELEMEDICINE (OUTPATIENT)
Dept: SURGERY | Facility: CLINIC | Age: 80
End: 2025-07-31
Payer: MEDICARE

## 2025-07-31 DIAGNOSIS — Z93.1 GASTROSTOMY TUBE IN PLACE: Primary | ICD-10-CM

## 2025-07-31 PROCEDURE — 1036F TOBACCO NON-USER: CPT | Performed by: SURGERY

## 2025-07-31 PROCEDURE — 99211 OFF/OP EST MAY X REQ PHY/QHP: CPT | Performed by: SURGERY

## 2025-08-19 ENCOUNTER — TELEPHONE (OUTPATIENT)
Dept: SURGERY | Facility: CLINIC | Age: 80
End: 2025-08-19
Payer: MEDICARE

## 2025-08-28 ENCOUNTER — OFFICE VISIT (OUTPATIENT)
Dept: SURGERY | Facility: CLINIC | Age: 80
End: 2025-08-28
Payer: MEDICARE

## 2025-08-28 VITALS
TEMPERATURE: 97.6 F | SYSTOLIC BLOOD PRESSURE: 161 MMHG | OXYGEN SATURATION: 97 % | DIASTOLIC BLOOD PRESSURE: 75 MMHG | WEIGHT: 128.9 LBS | HEIGHT: 64 IN | BODY MASS INDEX: 22.01 KG/M2 | HEART RATE: 99 BPM

## 2025-08-28 DIAGNOSIS — E44.1 MILD PROTEIN-CALORIE MALNUTRITION (MULTI): ICD-10-CM

## 2025-08-28 DIAGNOSIS — R13.19 ESOPHAGEAL DYSPHAGIA: ICD-10-CM

## 2025-08-28 DIAGNOSIS — R10.33 PERIUMBILICAL ABDOMINAL PAIN: Primary | ICD-10-CM

## 2025-08-28 DIAGNOSIS — Z87.19 H/O ESOPHAGEAL HERNIA REPAIR: ICD-10-CM

## 2025-08-28 DIAGNOSIS — R63.4 UNINTENTIONAL WEIGHT LOSS: ICD-10-CM

## 2025-08-28 DIAGNOSIS — D64.9 ANEMIA, UNSPECIFIED TYPE: ICD-10-CM

## 2025-08-28 DIAGNOSIS — K21.9 GASTROESOPHAGEAL REFLUX DISEASE WITHOUT ESOPHAGITIS: ICD-10-CM

## 2025-08-28 DIAGNOSIS — Z13.21 ENCOUNTER FOR VITAMIN DEFICIENCY SCREENING: ICD-10-CM

## 2025-08-28 DIAGNOSIS — R09.A2 GLOBUS SENSATION: ICD-10-CM

## 2025-08-28 DIAGNOSIS — R10.12 LEFT UPPER QUADRANT ABDOMINAL PAIN: ICD-10-CM

## 2025-08-28 DIAGNOSIS — Z98.890 H/O ESOPHAGEAL HERNIA REPAIR: ICD-10-CM

## 2025-08-28 DIAGNOSIS — Z93.1 GASTROSTOMY TUBE IN PLACE: ICD-10-CM

## 2025-08-28 PROCEDURE — 3077F SYST BP >= 140 MM HG: CPT | Performed by: SURGERY

## 2025-08-28 PROCEDURE — 1159F MED LIST DOCD IN RCRD: CPT | Performed by: SURGERY

## 2025-08-28 PROCEDURE — 99211 OFF/OP EST MAY X REQ PHY/QHP: CPT | Performed by: SURGERY

## 2025-08-28 PROCEDURE — 1036F TOBACCO NON-USER: CPT | Performed by: SURGERY

## 2025-08-28 PROCEDURE — 3078F DIAST BP <80 MM HG: CPT | Performed by: SURGERY

## 2025-08-28 PROCEDURE — 1125F AMNT PAIN NOTED PAIN PRSNT: CPT | Performed by: SURGERY

## 2025-08-28 RX ORDER — CYANOCOBALAMIN 1000 UG/ML
INJECTION, SOLUTION INTRAMUSCULAR; SUBCUTANEOUS
COMMUNITY

## 2025-08-28 RX ORDER — ASPIRIN 325 MG
TABLET, DELAYED RELEASE (ENTERIC COATED) ORAL
COMMUNITY

## 2025-08-28 RX ORDER — BIOTIN 5 MG
TABLET ORAL
COMMUNITY

## 2025-08-28 ASSESSMENT — PAIN SCALES - GENERAL: PAINLEVEL_OUTOF10: 3

## 2025-08-31 LAB
25(OH)D3+25(OH)D2 SERPL-MCNC: 122 NG/ML (ref 30–100)
ALBUMIN SERPL-MCNC: 4 G/DL (ref 3.6–5.1)
ALP SERPL-CCNC: 74 U/L (ref 37–153)
ALT SERPL-CCNC: 12 U/L (ref 6–29)
ANION GAP SERPL CALCULATED.4IONS-SCNC: 11 MMOL/L (CALC) (ref 7–17)
AST SERPL-CCNC: 8 U/L (ref 10–35)
BASOPHILS # BLD AUTO: 40 CELLS/UL (ref 0–200)
BASOPHILS NFR BLD AUTO: 0.6 %
BILIRUB SERPL-MCNC: 0.3 MG/DL (ref 0.2–1.2)
BUN SERPL-MCNC: 35 MG/DL (ref 7–25)
CALCIUM SERPL-MCNC: 9.7 MG/DL (ref 8.6–10.4)
CHLORIDE SERPL-SCNC: 104 MMOL/L (ref 98–110)
CO2 SERPL-SCNC: 24 MMOL/L (ref 20–32)
COPPER BLD-MCNC: NORMAL UG/DL
CREAT SERPL-MCNC: 1.03 MG/DL (ref 0.6–1)
EGFRCR SERPLBLD CKD-EPI 2021: 56 ML/MIN/1.73M2
EOSINOPHIL # BLD AUTO: 101 CELLS/UL (ref 15–500)
EOSINOPHIL NFR BLD AUTO: 1.5 %
ERYTHROCYTE [DISTWIDTH] IN BLOOD BY AUTOMATED COUNT: 15.9 % (ref 11–15)
FERRITIN SERPL-MCNC: 65 NG/ML (ref 16–288)
FOLATE SERPL-MCNC: 7.9 NG/ML
GLUCOSE SERPL-MCNC: 101 MG/DL (ref 65–139)
HCT VFR BLD AUTO: 30.2 % (ref 35–45)
HGB BLD-MCNC: 9.3 G/DL (ref 11.7–15.5)
IRON SATN MFR SERPL: 14 % (CALC) (ref 16–45)
IRON SERPL-MCNC: 36 MCG/DL (ref 45–160)
LYMPHOCYTES # BLD AUTO: 824 CELLS/UL (ref 850–3900)
LYMPHOCYTES NFR BLD AUTO: 12.3 %
MCH RBC QN AUTO: 26.9 PG (ref 27–33)
MCHC RBC AUTO-ENTMCNC: 30.8 G/DL (ref 32–36)
MCV RBC AUTO: 87.3 FL (ref 80–100)
MONOCYTES # BLD AUTO: 509 CELLS/UL (ref 200–950)
MONOCYTES NFR BLD AUTO: 7.6 %
NEUTROPHILS # BLD AUTO: 5226 CELLS/UL (ref 1500–7800)
NEUTROPHILS NFR BLD AUTO: 78 %
PLATELET # BLD AUTO: 497 THOUSAND/UL (ref 140–400)
PMV BLD REES-ECKER: 9.5 FL (ref 7.5–12.5)
POTASSIUM SERPL-SCNC: 4.6 MMOL/L (ref 3.5–5.3)
PROT SERPL-MCNC: 6.7 G/DL (ref 6.1–8.1)
RBC # BLD AUTO: 3.46 MILLION/UL (ref 3.8–5.1)
SODIUM SERPL-SCNC: 139 MMOL/L (ref 135–146)
TIBC SERPL-MCNC: 259 MCG/DL (CALC) (ref 250–450)
VIT B1 BLD-SCNC: NORMAL NMOL/L
VIT B12 SERPL-MCNC: 364 PG/ML (ref 200–1100)
WBC # BLD AUTO: 6.7 THOUSAND/UL (ref 3.8–10.8)
ZINC SERPL-MCNC: 52 MCG/DL (ref 60–130)

## 2025-09-02 ENCOUNTER — TELEPHONE (OUTPATIENT)
Dept: SURGERY | Facility: CLINIC | Age: 80
End: 2025-09-02
Payer: MEDICARE

## 2025-09-04 ENCOUNTER — APPOINTMENT (OUTPATIENT)
Dept: RADIOLOGY | Facility: CLINIC | Age: 80
End: 2025-09-04
Payer: MEDICARE

## 2025-09-04 LAB
25(OH)D3+25(OH)D2 SERPL-MCNC: 122 NG/ML (ref 30–100)
ALBUMIN SERPL-MCNC: 4 G/DL (ref 3.6–5.1)
ALP SERPL-CCNC: 74 U/L (ref 37–153)
ALT SERPL-CCNC: 12 U/L (ref 6–29)
ANION GAP SERPL CALCULATED.4IONS-SCNC: 11 MMOL/L (CALC) (ref 7–17)
AST SERPL-CCNC: 8 U/L (ref 10–35)
BASOPHILS # BLD AUTO: 40 CELLS/UL (ref 0–200)
BASOPHILS NFR BLD AUTO: 0.6 %
BILIRUB SERPL-MCNC: 0.3 MG/DL (ref 0.2–1.2)
BUN SERPL-MCNC: 35 MG/DL (ref 7–25)
CALCIUM SERPL-MCNC: 9.7 MG/DL (ref 8.6–10.4)
CHLORIDE SERPL-SCNC: 104 MMOL/L (ref 98–110)
CO2 SERPL-SCNC: 24 MMOL/L (ref 20–32)
COPPER BLD-MCNC: 110 MCG/DL
CREAT SERPL-MCNC: 1.03 MG/DL (ref 0.6–1)
EGFRCR SERPLBLD CKD-EPI 2021: 56 ML/MIN/1.73M2
EOSINOPHIL # BLD AUTO: 101 CELLS/UL (ref 15–500)
EOSINOPHIL NFR BLD AUTO: 1.5 %
ERYTHROCYTE [DISTWIDTH] IN BLOOD BY AUTOMATED COUNT: 15.9 % (ref 11–15)
FERRITIN SERPL-MCNC: 65 NG/ML (ref 16–288)
FOLATE SERPL-MCNC: 7.9 NG/ML
GLUCOSE SERPL-MCNC: 101 MG/DL (ref 65–139)
HCT VFR BLD AUTO: 30.2 % (ref 35–45)
HGB BLD-MCNC: 9.3 G/DL (ref 11.7–15.5)
IRON SATN MFR SERPL: 14 % (CALC) (ref 16–45)
IRON SERPL-MCNC: 36 MCG/DL (ref 45–160)
LYMPHOCYTES # BLD AUTO: 824 CELLS/UL (ref 850–3900)
LYMPHOCYTES NFR BLD AUTO: 12.3 %
MCH RBC QN AUTO: 26.9 PG (ref 27–33)
MCHC RBC AUTO-ENTMCNC: 30.8 G/DL (ref 32–36)
MCV RBC AUTO: 87.3 FL (ref 80–100)
MONOCYTES # BLD AUTO: 509 CELLS/UL (ref 200–950)
MONOCYTES NFR BLD AUTO: 7.6 %
NEUTROPHILS # BLD AUTO: 5226 CELLS/UL (ref 1500–7800)
NEUTROPHILS NFR BLD AUTO: 78 %
PLATELET # BLD AUTO: 497 THOUSAND/UL (ref 140–400)
PMV BLD REES-ECKER: 9.5 FL (ref 7.5–12.5)
POTASSIUM SERPL-SCNC: 4.6 MMOL/L (ref 3.5–5.3)
PROT SERPL-MCNC: 6.7 G/DL (ref 6.1–8.1)
RBC # BLD AUTO: 3.46 MILLION/UL (ref 3.8–5.1)
SODIUM SERPL-SCNC: 139 MMOL/L (ref 135–146)
TIBC SERPL-MCNC: 259 MCG/DL (CALC) (ref 250–450)
VIT B1 BLD-SCNC: 117 NMOL/L (ref 78–185)
VIT B12 SERPL-MCNC: 364 PG/ML (ref 200–1100)
WBC # BLD AUTO: 6.7 THOUSAND/UL (ref 3.8–10.8)
ZINC SERPL-MCNC: 52 MCG/DL (ref 60–130)

## 2025-09-05 ENCOUNTER — APPOINTMENT (OUTPATIENT)
Dept: RADIOLOGY | Facility: CLINIC | Age: 80
End: 2025-09-05
Payer: MEDICARE

## 2025-09-18 ENCOUNTER — APPOINTMENT (OUTPATIENT)
Dept: SURGERY | Facility: CLINIC | Age: 80
End: 2025-09-18
Payer: MEDICARE

## (undated) DEVICE — NEEDLE, CLOSURE, OMNICLOSE

## (undated) DEVICE — HOLSTER, JET SAFETY

## (undated) DEVICE — RELOAD, ENDOSTITCH, SURGIDAC, 2-0, 48 IN, GREEN, SULU

## (undated) DEVICE — TUBE SET, PNEUMOLAR HEATED, SMOKE EVACU, HIGH-FLOW

## (undated) DEVICE — LIGASURE, L-HOOK 44CM SEALER/DIVIDER LAP, MARYLAND

## (undated) DEVICE — STAPLER,  LINEAR ENDO 60MM RELOAD, GREEN, DISP

## (undated) DEVICE — SLEEVE, KII, Z-THREAD, 5X100CM

## (undated) DEVICE — SLEEVE, KII, Z-THREAD, 12X100CM

## (undated) DEVICE — DRAIN, CHANNEL, ROUND, FULL FLUTE 19F

## (undated) DEVICE — SLEEVE, VASO PRESS, CALF GARMENT, MEDIUM, GREEN

## (undated) DEVICE — EVACUATOR, WOUND, SUCTION, CLOSED, JACKSON-PRATT, 100 CC, SILICONE

## (undated) DEVICE — STAPLER, LINEAR ENDO RELOAD, 60MM, BLUE, DISP

## (undated) DEVICE — TROCAR, OPTICAL, BLADELESS, 12MM, THREADED, 100MM LENGTH

## (undated) DEVICE — DRAIN, JP CHANNEL, 19 FR, FULL FLUTE

## (undated) DEVICE — HOLSTER, ELECTROSURGERY ACCESSORY, STERILE

## (undated) DEVICE — STAPLELINE, BIOABSORB, SEAMGUARD, 60

## (undated) DEVICE — NEEDLE, EPIDURAL 17G X 4 1/2 PERIFIX

## (undated) DEVICE — APPLICATOR, CHLORAPREP, W/ORANGE TINT, 26ML

## (undated) DEVICE — RELOAD, ENDOSTITCH, POLYSORB, 2-0, 48 IN, ES9, VIOLET, SULU

## (undated) DEVICE — RELOAD, ENDOSTITCH, SURGIDAC, 0, 48 IN, GREEN, SULU

## (undated) DEVICE — DEVICE, SUTURE, ENDOSTITCH, 10 MM

## (undated) DEVICE — TROCAR, OPTICAL, BLADELESS, KII FIOS, 5 X 100MM, THREADED

## (undated) DEVICE — KIT, LAP GASTRIC BYPASS, CUSTOM, PARMA

## (undated) DEVICE — BINDER, ABDOMINAL, 4 PANEL, 12 X 46-62 IN

## (undated) DEVICE — ASSEMBLY, STRYKER FLOW 2, SUCTION IRRIGATOR, WITH TIP

## (undated) DEVICE — PROTECTOR, HEEL/ANKLE/ELBOW, UNIVERSAL

## (undated) DEVICE — SYRINGE, 30 CC, LUER LOCK

## (undated) DEVICE — CLEAN KIT, ANTIFOG SCOPE, SEE SHARP 150MM

## (undated) DEVICE — STAPLER, ECHELON 3000, 60MM LONG